# Patient Record
Sex: FEMALE | Race: WHITE | Employment: OTHER | URBAN - METROPOLITAN AREA
[De-identification: names, ages, dates, MRNs, and addresses within clinical notes are randomized per-mention and may not be internally consistent; named-entity substitution may affect disease eponyms.]

---

## 2017-05-15 PROBLEM — E78.01 FAMILIAL HYPERCHOLESTEROLEMIA: Status: ACTIVE | Noted: 2017-05-15

## 2017-05-15 PROBLEM — I10 ESSENTIAL HYPERTENSION: Status: ACTIVE | Noted: 2017-05-15

## 2017-05-15 PROBLEM — M81.0 OSTEOPOROSIS: Status: ACTIVE | Noted: 2017-05-15

## 2018-03-15 ENCOUNTER — HOSPITAL ENCOUNTER (OUTPATIENT)
Age: 78
Discharge: HOME | End: 2018-03-15
Payer: MEDICARE

## 2018-03-15 LAB
ALBUMIN SERPL-MCNC: 4.2 G/DL (ref 3.5–5.2)
ALP BLD-CCNC: 40 U/L (ref 35–104)
ALT SERPL-CCNC: 16 U/L (ref 0–32)
ANION GAP SERPL CALCULATED.3IONS-SCNC: 11 MMOL/L (ref 7–16)
AST SERPL-CCNC: 18 U/L (ref 0–31)
BILIRUB SERPL-MCNC: 0.4 MG/DL (ref 0–1.2)
BUN BLDV-MCNC: 24 MG/DL (ref 8–23)
CALCIUM SERPL-MCNC: 9.5 MG/DL (ref 8.6–10.2)
CHLORIDE BLD-SCNC: 105 MMOL/L (ref 98–107)
CHOLESTEROL, TOTAL: 170 MG/DL (ref 0–199)
CO2: 26 MMOL/L (ref 22–29)
CREAT SERPL-MCNC: 0.7 MG/DL (ref 0.5–1)
GFR AFRICAN AMERICAN: >60
GFR NON-AFRICAN AMERICAN: >60 ML/MIN/1.73
GLUCOSE BLD-MCNC: 114 MG/DL (ref 74–109)
HDLC SERPL-MCNC: 60 MG/DL
LDL CHOLESTEROL CALCULATED: 82 MG/DL (ref 0–99)
POTASSIUM SERPL-SCNC: 4.5 MMOL/L (ref 3.5–5)
SODIUM BLD-SCNC: 142 MMOL/L (ref 132–146)
TOTAL PROTEIN: 6.8 G/DL (ref 6.4–8.3)
TRIGL SERPL-MCNC: 139 MG/DL (ref 0–149)
VLDLC SERPL CALC-MCNC: 28 MG/DL

## 2018-03-15 PROCEDURE — 80053 COMPREHEN METABOLIC PANEL: CPT

## 2018-03-15 PROCEDURE — 36415 COLL VENOUS BLD VENIPUNCTURE: CPT

## 2018-03-15 PROCEDURE — 80061 LIPID PANEL: CPT

## 2018-10-08 ENCOUNTER — HOSPITAL ENCOUNTER (OUTPATIENT)
Age: 78
Discharge: HOME | End: 2018-10-08
Payer: MEDICARE

## 2018-10-08 DIAGNOSIS — E55.9 VITAMIN D DEFICIENCY: ICD-10-CM

## 2018-10-08 DIAGNOSIS — D50.0 IRON DEFICIENCY ANEMIA SECONDARY TO BLOOD LOSS (CHRONIC): ICD-10-CM

## 2018-10-08 DIAGNOSIS — E03.9 PRIMARY HYPOTHYROIDISM: ICD-10-CM

## 2018-10-08 DIAGNOSIS — E78.01 FAMILIAL HYPERCHOLESTEROLEMIA: ICD-10-CM

## 2018-10-08 DIAGNOSIS — R73.9 HYPERGLYCEMIA: ICD-10-CM

## 2018-10-08 LAB
ALBUMIN SERPL-MCNC: 4.4 G/DL (ref 3.5–5.2)
ALP BLD-CCNC: 40 U/L (ref 35–104)
ALT SERPL-CCNC: 21 U/L (ref 0–32)
ANION GAP SERPL CALCULATED.3IONS-SCNC: 11 MMOL/L (ref 7–16)
AST SERPL-CCNC: 22 U/L (ref 0–31)
BILIRUB SERPL-MCNC: 0.4 MG/DL (ref 0–1.2)
BUN BLDV-MCNC: 23 MG/DL (ref 8–23)
CALCIUM SERPL-MCNC: 9.8 MG/DL (ref 8.6–10.2)
CHLORIDE BLD-SCNC: 103 MMOL/L (ref 98–107)
CHOLESTEROL, TOTAL: 175 MG/DL (ref 0–199)
CO2: 29 MMOL/L (ref 22–29)
CREAT SERPL-MCNC: 0.8 MG/DL (ref 0.5–1)
GFR AFRICAN AMERICAN: >60
GFR NON-AFRICAN AMERICAN: >60 ML/MIN/1.73
GLUCOSE BLD-MCNC: 107 MG/DL (ref 74–109)
HBA1C MFR BLD: 6 % (ref 4–5.6)
HCT VFR BLD CALC: 41.9 % (ref 34–48)
HDLC SERPL-MCNC: 63 MG/DL
HEMOGLOBIN: 13.8 G/DL (ref 11.5–15.5)
LDL CHOLESTEROL CALCULATED: 79 MG/DL (ref 0–99)
MCH RBC QN AUTO: 29.9 PG (ref 26–35)
MCHC RBC AUTO-ENTMCNC: 32.9 % (ref 32–34.5)
MCV RBC AUTO: 90.9 FL (ref 80–99.9)
PDW BLD-RTO: 13.7 FL (ref 11.5–15)
PLATELET # BLD: 208 E9/L (ref 130–450)
PMV BLD AUTO: 11.4 FL (ref 7–12)
POTASSIUM SERPL-SCNC: 4.6 MMOL/L (ref 3.5–5)
RBC # BLD: 4.61 E12/L (ref 3.5–5.5)
SODIUM BLD-SCNC: 143 MMOL/L (ref 132–146)
T4 FREE: 1.23 NG/DL (ref 0.93–1.7)
TOTAL PROTEIN: 7.3 G/DL (ref 6.4–8.3)
TRIGL SERPL-MCNC: 167 MG/DL (ref 0–149)
TSH SERPL DL<=0.05 MIU/L-ACNC: 1.38 UIU/ML (ref 0.27–4.2)
VITAMIN D 25-HYDROXY: 55 NG/ML (ref 30–100)
VLDLC SERPL CALC-MCNC: 33 MG/DL
WBC # BLD: 6.8 E9/L (ref 4.5–11.5)

## 2018-10-08 PROCEDURE — 85027 COMPLETE CBC AUTOMATED: CPT

## 2018-10-08 PROCEDURE — 84443 ASSAY THYROID STIM HORMONE: CPT

## 2018-10-08 PROCEDURE — 80053 COMPREHEN METABOLIC PANEL: CPT

## 2018-10-08 PROCEDURE — 36415 COLL VENOUS BLD VENIPUNCTURE: CPT

## 2018-10-08 PROCEDURE — 83036 HEMOGLOBIN GLYCOSYLATED A1C: CPT

## 2018-10-08 PROCEDURE — 82306 VITAMIN D 25 HYDROXY: CPT

## 2018-10-08 PROCEDURE — 80061 LIPID PANEL: CPT

## 2018-10-08 PROCEDURE — 84439 ASSAY OF FREE THYROXINE: CPT

## 2018-11-30 ENCOUNTER — HOSPITAL ENCOUNTER (OUTPATIENT)
Dept: GENERAL RADIOLOGY | Age: 78
Discharge: HOME | End: 2018-12-02
Payer: MEDICARE

## 2018-11-30 ENCOUNTER — HOSPITAL ENCOUNTER (OUTPATIENT)
Age: 78
Discharge: HOME | End: 2018-12-02
Payer: MEDICARE

## 2018-11-30 ENCOUNTER — HOSPITAL ENCOUNTER (OUTPATIENT)
Age: 78
Discharge: HOME | End: 2018-11-30
Payer: MEDICARE

## 2018-11-30 DIAGNOSIS — M79.10 MYALGIA: ICD-10-CM

## 2018-11-30 DIAGNOSIS — M54.40 ACUTE LEFT-SIDED LOW BACK PAIN WITH SCIATICA, SCIATICA LATERALITY UNSPECIFIED: ICD-10-CM

## 2018-11-30 LAB — TOTAL CK: 434 U/L (ref 20–180)

## 2018-11-30 PROCEDURE — 82550 ASSAY OF CK (CPK): CPT

## 2018-11-30 PROCEDURE — 72100 X-RAY EXAM L-S SPINE 2/3 VWS: CPT

## 2018-11-30 PROCEDURE — 36415 COLL VENOUS BLD VENIPUNCTURE: CPT

## 2019-01-17 ENCOUNTER — HOSPITAL ENCOUNTER (OUTPATIENT)
Dept: MRI IMAGING | Age: 79
Discharge: HOME | End: 2019-01-19
Payer: MEDICARE

## 2019-01-17 DIAGNOSIS — M54.42 ACUTE LEFT-SIDED LOW BACK PAIN WITH LEFT-SIDED SCIATICA: ICD-10-CM

## 2019-01-17 PROCEDURE — 72158 MRI LUMBAR SPINE W/O & W/DYE: CPT

## 2019-01-17 PROCEDURE — A9577 INJ MULTIHANCE: HCPCS | Performed by: RADIOLOGY

## 2019-01-17 PROCEDURE — 6360000004 HC RX CONTRAST MEDICATION: Performed by: RADIOLOGY

## 2019-01-17 RX ADMIN — GADOBENATE DIMEGLUMINE 20 ML: 529 INJECTION, SOLUTION INTRAVENOUS at 08:40

## 2019-03-01 ENCOUNTER — INITIAL CONSULT (OUTPATIENT)
Dept: NEUROSURGERY | Age: 79
End: 2019-03-01
Payer: MEDICARE

## 2019-03-01 VITALS
DIASTOLIC BLOOD PRESSURE: 73 MMHG | SYSTOLIC BLOOD PRESSURE: 137 MMHG | BODY MASS INDEX: 27.28 KG/M2 | HEART RATE: 81 BPM | WEIGHT: 180 LBS | HEIGHT: 68 IN

## 2019-03-01 DIAGNOSIS — M48.061 DEGENERATIVE LUMBAR SPINAL STENOSIS: ICD-10-CM

## 2019-03-01 PROCEDURE — G8427 DOCREV CUR MEDS BY ELIG CLIN: HCPCS | Performed by: NEUROLOGICAL SURGERY

## 2019-03-01 PROCEDURE — 1090F PRES/ABSN URINE INCON ASSESS: CPT | Performed by: NEUROLOGICAL SURGERY

## 2019-03-01 PROCEDURE — 99203 OFFICE O/P NEW LOW 30 MIN: CPT | Performed by: NEUROLOGICAL SURGERY

## 2019-03-01 PROCEDURE — G8482 FLU IMMUNIZE ORDER/ADMIN: HCPCS | Performed by: NEUROLOGICAL SURGERY

## 2019-03-01 PROCEDURE — G8419 CALC BMI OUT NRM PARAM NOF/U: HCPCS | Performed by: NEUROLOGICAL SURGERY

## 2019-03-01 PROCEDURE — 1101F PT FALLS ASSESS-DOCD LE1/YR: CPT | Performed by: NEUROLOGICAL SURGERY

## 2019-03-01 RX ORDER — GABAPENTIN 300 MG/1
300 CAPSULE ORAL 3 TIMES DAILY
Qty: 21 CAPSULE | Refills: 0 | Status: SHIPPED | OUTPATIENT
Start: 2019-03-01 | End: 2019-03-19

## 2019-03-12 ENCOUNTER — OFFICE VISIT (OUTPATIENT)
Dept: PAIN MANAGEMENT | Age: 79
End: 2019-03-12
Payer: MEDICARE

## 2019-03-12 ENCOUNTER — PREP FOR PROCEDURE (OUTPATIENT)
Dept: PAIN MANAGEMENT | Age: 79
End: 2019-03-12

## 2019-03-12 VITALS
DIASTOLIC BLOOD PRESSURE: 78 MMHG | HEART RATE: 81 BPM | TEMPERATURE: 98.1 F | WEIGHT: 179 LBS | BODY MASS INDEX: 27.13 KG/M2 | HEIGHT: 68 IN | SYSTOLIC BLOOD PRESSURE: 122 MMHG | OXYGEN SATURATION: 98 % | RESPIRATION RATE: 16 BRPM

## 2019-03-12 DIAGNOSIS — M51.36 DDD (DEGENERATIVE DISC DISEASE), LUMBAR: ICD-10-CM

## 2019-03-12 DIAGNOSIS — M71.38 SYNOVIAL CYST OF LUMBAR FACET JOINT: ICD-10-CM

## 2019-03-12 DIAGNOSIS — M54.16 LUMBAR RADICULOPATHY: Primary | ICD-10-CM

## 2019-03-12 DIAGNOSIS — G89.4 CHRONIC PAIN SYNDROME: ICD-10-CM

## 2019-03-12 DIAGNOSIS — M48.061 SPINAL STENOSIS OF LUMBAR REGION, UNSPECIFIED WHETHER NEUROGENIC CLAUDICATION PRESENT: ICD-10-CM

## 2019-03-12 PROBLEM — M51.369 DDD (DEGENERATIVE DISC DISEASE), LUMBAR: Status: ACTIVE | Noted: 2019-03-12

## 2019-03-12 PROCEDURE — 99213 OFFICE O/P EST LOW 20 MIN: CPT

## 2019-03-12 PROCEDURE — 99204 OFFICE O/P NEW MOD 45 MIN: CPT | Performed by: ANESTHESIOLOGY

## 2019-03-19 ENCOUNTER — HOSPITAL ENCOUNTER (OUTPATIENT)
Dept: OPERATING ROOM | Age: 79
Discharge: HOME | End: 2019-03-19
Payer: MEDICARE

## 2019-03-19 ENCOUNTER — HOSPITAL ENCOUNTER (OUTPATIENT)
Age: 79
Setting detail: OUTPATIENT SURGERY
Discharge: HOME | End: 2019-03-19
Attending: ANESTHESIOLOGY | Admitting: ANESTHESIOLOGY
Payer: MEDICARE

## 2019-03-19 VITALS
OXYGEN SATURATION: 96 % | SYSTOLIC BLOOD PRESSURE: 158 MMHG | RESPIRATION RATE: 14 BRPM | HEART RATE: 78 BPM | DIASTOLIC BLOOD PRESSURE: 77 MMHG

## 2019-03-19 DIAGNOSIS — M54.16 LUMBAR RADICULOPATHY: ICD-10-CM

## 2019-03-19 PROCEDURE — 7100000010 HC PHASE II RECOVERY - FIRST 15 MIN: Performed by: ANESTHESIOLOGY

## 2019-03-19 PROCEDURE — 64484 NJX AA&/STRD TFRM EPI L/S EA: CPT | Performed by: ANESTHESIOLOGY

## 2019-03-19 PROCEDURE — 6360000004 HC RX CONTRAST MEDICATION: Performed by: ANESTHESIOLOGY

## 2019-03-19 PROCEDURE — 3600000015 HC SURGERY LEVEL 5 ADDTL 15MIN: Performed by: ANESTHESIOLOGY

## 2019-03-19 PROCEDURE — 3209999900 FLUORO FOR SURGICAL PROCEDURES

## 2019-03-19 PROCEDURE — 64483 NJX AA&/STRD TFRM EPI L/S 1: CPT | Performed by: ANESTHESIOLOGY

## 2019-03-19 PROCEDURE — 7100000011 HC PHASE II RECOVERY - ADDTL 15 MIN: Performed by: ANESTHESIOLOGY

## 2019-03-19 PROCEDURE — 2709999900 HC NON-CHARGEABLE SUPPLY: Performed by: ANESTHESIOLOGY

## 2019-03-19 PROCEDURE — 2500000003 HC RX 250 WO HCPCS: Performed by: ANESTHESIOLOGY

## 2019-03-19 PROCEDURE — 3600000005 HC SURGERY LEVEL 5 BASE: Performed by: ANESTHESIOLOGY

## 2019-03-19 PROCEDURE — 6360000002 HC RX W HCPCS: Performed by: ANESTHESIOLOGY

## 2019-03-19 RX ORDER — LIDOCAINE HYDROCHLORIDE 5 MG/ML
INJECTION, SOLUTION INFILTRATION; INTRAVENOUS PRN
Status: DISCONTINUED | OUTPATIENT
Start: 2019-03-19 | End: 2019-03-19 | Stop reason: ALTCHOICE

## 2019-03-19 ASSESSMENT — PAIN SCALES - GENERAL
PAINLEVEL_OUTOF10: 0
PAINLEVEL_OUTOF10: 0

## 2019-03-19 ASSESSMENT — PAIN - FUNCTIONAL ASSESSMENT: PAIN_FUNCTIONAL_ASSESSMENT: 0-10

## 2019-04-05 ENCOUNTER — OFFICE VISIT (OUTPATIENT)
Dept: PAIN MANAGEMENT | Age: 79
End: 2019-04-05
Payer: MEDICARE

## 2019-04-05 ENCOUNTER — PREP FOR PROCEDURE (OUTPATIENT)
Dept: PAIN MANAGEMENT | Age: 79
End: 2019-04-05

## 2019-04-05 VITALS
DIASTOLIC BLOOD PRESSURE: 76 MMHG | HEIGHT: 68 IN | HEART RATE: 75 BPM | TEMPERATURE: 98.4 F | BODY MASS INDEX: 27.28 KG/M2 | OXYGEN SATURATION: 96 % | WEIGHT: 180 LBS | RESPIRATION RATE: 16 BRPM | SYSTOLIC BLOOD PRESSURE: 134 MMHG

## 2019-04-05 DIAGNOSIS — M54.16 LUMBAR RADICULOPATHY: Primary | ICD-10-CM

## 2019-04-05 DIAGNOSIS — M51.36 DDD (DEGENERATIVE DISC DISEASE), LUMBAR: ICD-10-CM

## 2019-04-05 DIAGNOSIS — M48.061 SPINAL STENOSIS OF LUMBAR REGION, UNSPECIFIED WHETHER NEUROGENIC CLAUDICATION PRESENT: ICD-10-CM

## 2019-04-05 DIAGNOSIS — M71.38 SYNOVIAL CYST OF LUMBAR FACET JOINT: ICD-10-CM

## 2019-04-05 PROCEDURE — 99213 OFFICE O/P EST LOW 20 MIN: CPT | Performed by: ANESTHESIOLOGY

## 2019-04-05 NOTE — PROGRESS NOTES
[facility]  [address] [facility], [address] [place] [address]  [phone]    Follow up Note      Alma Lopez     Date of Visit:  4/5/2019    CC:  Patient presents for follow up   Chief Complaint   Patient presents with    Follow Up After Procedure      Left Transforaminal epidural steroid injection under fluoroscopic guidance at foraminal level L4 and L5 (#1). HPI:  [de-identified] yrs pleasant lady with h/o Low back and left LE pain mainly over the left posterior thigh for over 3-4 months.     Failed conservative RX - meds and PT.     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 Has been evaluated by Dr. Maribell Marcos and recommended interventional procedures    S/P Left lumbar TFESI L4/ L5- has helped with functionality- able to walk longer, stand longer and sleep better. Pain relief is minimal.  Continues to have pain. Pain is slightly improved. Change in quality of symptoms:no. Medication side effects:none. Recent diagnostic testing:none. Recent interventional procedures:lumbar TFESI- improved functionality noted. .    She has not been on anticoagulation medications to include none. The patient  has not been on herbal supplements. The patient is not diabetic. Previous treatments:    Physical therapy for 4-6 weeks - with no significant benefit.    Tried meds , OTC NSAID's, gabapentin etc.- not much benefit       Imaging studies:  MRI LS spine In 1/17/2019:     EXAM: MRI LUMBAR SPINE W WO CONTRAST       COMPARISON: None       HISTORY: Left leg pain        TECHNIQUE: Multiplanar T1 and T2-weighted MRI images of the lumbar   spine were performed.       CONTRAST: 20 mL of gadolinium based contrast was administered.       FINDINGS:        BONE MARROW SIGNAL: There is normal marrow signal.       T12-L1: Normal T12-L1       L1-L2: Normal L1-L2       L2-L3: Normal L2-L3       L3-L4: Normal L3-L4       L4-L5: There is a large heterogeneous high signal T2 mass within the   central canal eccentrically on the left. This is noted adjacent to the   synovial facet joint and measures 1.3 x 0.9 cm. This results in severe   impingement upon the thecal sac and nerve roots. There is peripheral   rim-like enhancement on the postcontrast images. No extension into the   foramen. There is a superimposed disc bulge with facet hypertrophy   which contributes to the amount of canal narrowing.       L5-S1: Facet hypertrophy without central or foraminal stenosis.       SOFT TISSUES: The visualized paravertebral soft tissues are within   normal limits.           Impression   Large rim-enhancing high signal lesion in the L4-5 canal measuring 1.3   x 0.9 cm, likely extradural in location. This is most likely   representing a synovial facet cyst resulting in severe mass effect   upon the thecal sac. Other etiologies such as meningioma or nerve   sheath tumor considered less likely given the lack of homogeneous   enhancement. Potential Aberrant Drug-Related Behavior:  No    Urine Drug Screening:No      Past Medical History:   Diagnosis Date    Arthritis     right hip    Cancer ([address]     breast 1997 approx. left    Hyperlipidemia     Hypertension     Osteoarthrosis     Osteoporosis     Other chronic postoperative pain     Undiagnosed cardiac murmurs        Past Surgical History:   Procedure Laterality Date    APPENDECTOMY      BREAST SURGERY      biopsied on left breast    COLONOSCOPY      EPIDURAL STEROID INJECTION Left 3/19/2019    LEFT LUMBAR TRANSFORAMINAL EPIDURAL STEROID INJECTION: L4 / L5 performed by Keegan Long MD at [facility]      cataracts OU    FRACTURE SURGERY      right ankle    HYSTERECTOMY  1979    JOINT REPLACEMENT      left hip.r hip    NERVE BLOCK Left 03/19/2019    left lumbar transforaminal epidural    TONSILLECTOMY      TOTAL HIP ARTHROPLASTY Right Oct 2013    with cell saver    TOTAL KNEE ARTHROPLASTY Left 3/8/16       Prior to Admission medications    Medication Sig Start Date End Date Taking? Authorizing Provider   amLODIPine (NORVASC) 5 MG tablet Take 1 tablet by mouth daily 4/3/19  Yes Magalene Na, DO   atorvastatin (LIPITOR) 40 MG tablet Take 1 tablet by mouth daily Mon,wed,fri 1/10/19  Yes Magalene Na, DO   alendronate (FOSAMAX) 70 MG tablet Take 1 tablet by mouth every 7 days Takes on friday 1/10/19  Yes Magalene Na, DO   vitamin D (CHOLECALCIFEROL) 1000 UNITS TABS tablet Take 2,000 Units by mouth daily   Yes Historical Provider, MD   Red Yeast Rice 600 MG CAPS Take 2 capsules by mouth three times a week   Yes Historical Provider, MD   Multiple Vitamins-Minerals (CENTRUM SILVER ADULT 50+ PO) Take  by mouth daily. Yes Historical Provider, MD   calcium-vitamin D (OSCAL 500/200 D-3) 500-200 MG-UNIT per tablet Take 1 tablet by mouth 2 times daily. Yes Historical Provider, MD   NONFORMULARY daily. Tunde Red   Yes Historical Provider, MD   aspirin 81 MG tablet Take 81 mg by mouth nightly. Yes Historical Provider, MD       No Known Allergies    Social History     Socioeconomic History    Marital status:       Spouse name: Not on file    Number of children: Not on file    Years of education: Not on file    Highest education level: Not on file   Occupational History    Not on file   Social Needs    Financial resource strain: Not on file    Food insecurity:     Worry: Not on file     Inability: Not on file    Transportation needs:     Medical: Not on file     Non-medical: Not on file   Tobacco Use    Smoking status: Former Smoker     Packs/day: 0.25     Years: 3.00     Pack years: 0.75     Last attempt to quit: 9/10/1998     Years since quittin.5    Smokeless tobacco: Never Used    Tobacco comment: quit smoking approx 20 yrs ago   Substance and Sexual Activity    Alcohol use: Yes     Comment: occasionally    Drug use: No    Sexual activity: Not on file   Lifestyle    Physical activity:     Days per week: Not on file     Minutes per session: Not on file    Stress: Not on file   Relationships    Social connections:     Talks on phone: Not on file     Gets together: Not on file     Attends [employer] service: Not on file     Active member of club or organization: Not on file     Attends meetings of clubs or organizations: Not on file     Relationship status: Not on file    Intimate partner violence:     Fear of current or ex partner: Not on file     Emotionally abused: Not on file     Physically abused: Not on file     Forced sexual activity: Not on file   Other Topics Concern    Not on file   Social History Narrative    Not on file       History reviewed. No pertinent family history. REVIEW OF SYSTEMS:     Álvaro Mir denies fever/chills, chest pain, shortness of breath, new bowel or bladder complaints. All other review of systems was negative. PHYSICAL EXAMINATION:      /76   Pulse 75   Temp 98.4 °F (36.9 °C) (Oral)   Resp 16   Ht 5' 8\" (1.727 m)   Wt 180 lb (81.6 kg)   SpO2 96%   BMI 27.37 kg/m²     A & O X 3, NAD    Lumbar spine:     Spine inspection:normal   CVA tenderness:No   Palpation: NO tenderness paravertebral muscles, left, right. Range of motion:Some reduction noted in extension    Neurological:     Sensory:normal to light touch     Motor:                No change    Reflexes:    NO change  Gait:normal      Assessment/Plan:   Diagnosis Orders   1. Lumbar radiculopathy     2. DDD (degenerative disc disease), lumbar     3. Spinal stenosis of lumbar region, unspecified whether neurogenic claudication present     4. Synovial cyst of lumbar facet joint        S/P TFESI - helped with improved functionality but had minimal pain relief. Will try different approach. Will plan L4-5 Interlaminar EYAD left paramedian approach instead of TFESI   (or L5-S1 if access is difficult). HEP    If the EYAD is not helping.  Consider referring back to Dr. Remigio Lundborg for surgical eval.      Keegan Long, MD

## 2019-04-30 ENCOUNTER — HOSPITAL ENCOUNTER (OUTPATIENT)
Age: 79
Setting detail: OUTPATIENT SURGERY
Discharge: HOME | End: 2019-04-30
Attending: ANESTHESIOLOGY | Admitting: ANESTHESIOLOGY
Payer: MEDICARE

## 2019-04-30 VITALS
HEART RATE: 77 BPM | DIASTOLIC BLOOD PRESSURE: 80 MMHG | OXYGEN SATURATION: 97 % | SYSTOLIC BLOOD PRESSURE: 157 MMHG | RESPIRATION RATE: 18 BRPM

## 2019-04-30 DIAGNOSIS — M54.16 LUMBAR RADICULOPATHY: ICD-10-CM

## 2019-04-30 PROCEDURE — 7100000010 HC PHASE II RECOVERY - FIRST 15 MIN: Performed by: ANESTHESIOLOGY

## 2019-04-30 PROCEDURE — 6360000004 HC RX CONTRAST MEDICATION: Performed by: ANESTHESIOLOGY

## 2019-04-30 PROCEDURE — 7100000011 HC PHASE II RECOVERY - ADDTL 15 MIN: Performed by: ANESTHESIOLOGY

## 2019-04-30 PROCEDURE — 3600000005 HC SURGERY LEVEL 5 BASE: Performed by: ANESTHESIOLOGY

## 2019-04-30 PROCEDURE — 62323 NJX INTERLAMINAR LMBR/SAC: CPT | Performed by: ANESTHESIOLOGY

## 2019-04-30 PROCEDURE — 2500000003 HC RX 250 WO HCPCS: Performed by: ANESTHESIOLOGY

## 2019-04-30 PROCEDURE — 2709999900 HC NON-CHARGEABLE SUPPLY: Performed by: ANESTHESIOLOGY

## 2019-04-30 PROCEDURE — 6360000002 HC RX W HCPCS: Performed by: ANESTHESIOLOGY

## 2019-04-30 PROCEDURE — 3600000015 HC SURGERY LEVEL 5 ADDTL 15MIN: Performed by: ANESTHESIOLOGY

## 2019-04-30 RX ORDER — LIDOCAINE HYDROCHLORIDE 5 MG/ML
INJECTION, SOLUTION INFILTRATION; INTRAVENOUS PRN
Status: DISCONTINUED | OUTPATIENT
Start: 2019-04-30 | End: 2019-04-30 | Stop reason: ALTCHOICE

## 2019-04-30 ASSESSMENT — PAIN SCALES - GENERAL
PAINLEVEL_OUTOF10: 0
PAINLEVEL_OUTOF10: 0

## 2019-04-30 ASSESSMENT — PAIN - FUNCTIONAL ASSESSMENT: PAIN_FUNCTIONAL_ASSESSMENT: 0-10

## 2019-04-30 NOTE — OP NOTE
2019    Patient: Janee Trammell  :  1940  Age:  [de-identified] y.o. Sex:  female     PRE-OPERATIVE DIAGNOSIS: Lumbar disc displacement, lumbar radiculopathy. POST-OPERATIVE DIAGNOSIS: Same. PROCEDURE: Fluoroscopic guided therapeutic lumbar epidural steroid injection at the L5-S1 level . SURGEON: Mary Hopson MD    ANESTHESIA: local    ESTIMATED BLOOD LOSS: None.  ______________________________________________________________________    BRIEF HISTORY:  Janee Trammell comes in today for  lumbar epidural injection at L5-S1 level. The potential complications of this procedure were discussed with her again today. She has elected to undergo the aforementioned procedure. Anival complete History & Physical examination were reviewed in depth, a copy of which is in the chart. DESCRIPTION OF PROCEDURE:    After confirming written and informed consent, a time-out was performed and Anival name and date of birth, the procedure to be performed as well as the plan for the location of the needle insertion were confirmed. The patient was brought into the procedure room and placed in the prone position on the fluoroscopy table. A pillow was placed under the patient's lower abdomen/upper pelvis to increase lumbar interlaminar space. Standard monitors were placed, and vital signs were observed throughout the procedure. The area of the lumbar spine was prepped with chloraprep and draped in a sterile manner. The L5-S1 interspace was identified and marked under AP fluoroscopy. The skin and subcutaneous tissues at the above level were anesthestized with 0.5% lidocaine. With intermittent fluoroscopy, an # 18 gauge 3 1/2 tuohy epidural needle was inserted and directed toward the interlaminar space. The needle was slowly advanced using loss of resistance technique and 5 cc glass syringe  until the tip of the epidural needle has passed through the ligamentum flavum and entered the epidural space.  AP and lateral fluoroscopic imaging is performed to verify that the epidural needle is properly placed. Negative aspiration of blood and CSF was confirmed. 0.5 ml of omnipaque 240 was used for confirmation of even epidural spread under both live and AP fluoroscopy. After negative aspiration, a solution of 0.5 % Lidocaine 3 ml and 60 mg DepoMedrol was easily injected. The needle was gently removed intact . The patient neck was cleaned and a Band-Aid was placed over the needle insertion point. Disposition the patient tolerated the procedure well and there were no complications . Vital signs remained stable throughout the procedure. The patient was escorted to the recovery area where they remained until discharge and written discharge instructions for the procedure were given. Plan: Ishan Dunne will return to our pain management center as scheduled.      Osei Armando MD

## 2019-04-30 NOTE — H&P
[address]  [address] [facility], [address] [place] [address] [place]  [phone]    Procedure History & Physical      [facility]     HPI:    Patient  is here for Lumbar epidural steroid injection for low back and LE pain  Labs/imaging studies reviewed   All question and concerns addressed including R/B/A associated with the procedure    Past Medical History:   Diagnosis Date    Arthritis     right hip    Cancer ([address]     breast 1997 approx. left    Hyperlipidemia     Hypertension     Osteoarthrosis     Osteoporosis     Other chronic postoperative pain     Undiagnosed cardiac murmurs        Past Surgical History:   Procedure Laterality Date    APPENDECTOMY      BREAST SURGERY      biopsied on left breast    COLONOSCOPY      EPIDURAL STEROID INJECTION Left 3/19/2019    LEFT LUMBAR TRANSFORAMINAL EPIDURAL STEROID INJECTION: L4 / L5 performed by Polly Castaneda MD at [facility]      cataracts OU    FRACTURE SURGERY      right ankle    HYSTERECTOMY  1979    JOINT REPLACEMENT      left hip.r hip    NERVE BLOCK Left 03/19/2019    left lumbar transforaminal epidural    TONSILLECTOMY      TOTAL HIP ARTHROPLASTY Right Oct 2013    with cell saver    TOTAL KNEE ARTHROPLASTY Left 3/8/16       Prior to Admission medications    Medication Sig Start Date End Date Taking? Authorizing Provider   amLODIPine (NORVASC) 5 MG tablet Take 1 tablet by mouth daily 4/3/19  Yes Alvarez Shoemaker, DO   atorvastatin (LIPITOR) 40 MG tablet Take 1 tablet by mouth daily Mon,wed,fri 1/10/19  Yes Alvarez Shoemaker, DO   vitamin D (CHOLECALCIFEROL) 1000 UNITS TABS tablet Take 2,000 Units by mouth daily   Yes Historical Provider, MD   Red Yeast Rice 600 MG CAPS Take 2 capsules by mouth three times a week   Yes Historical Provider, MD   Multiple Vitamins-Minerals (CENTRUM SILVER ADULT 50+ PO) Take  by mouth daily.    Yes Historical Provider, MD   calcium-vitamin D (OSCAL 500/200 D-3) on file. REVIEW OF SYSTEMS:    CONSTITUTIONAL:  negative for  fevers, chills, sweats and fatigue    RESPIRATORY:  negative for  dry cough, cough with sputum, dyspnea, wheezing and chest pain    CARDIOVASCULAR:  negative for chest pain, dyspnea, palpitations, syncope    GASTROINTESTINAL:  negative for nausea, vomiting, change in bowel habits, diarrhea, constipation and abdominal pain    MUSCULOSKELETAL: negative for muscle weakness    SKIN: negative for itching or rashes. BEHAVIOR/PSYCH:  negative for poor appetite, increased appetite, decreased sleep and poor concentration    All other systems negative      PHYSICAL EXAM:    VITALS:  BP (!) 169/73   Pulse 77   Resp 14   SpO2 95%     CONSTITUTIONAL:  awake, alert, cooperative, no apparent distress, and appears stated age    EYES: PERRLA, EOMI    LUNGS:  No increased work of breathing, no audible wheezing    CARDIOVASCULAR:  regular rate and rhythm    ABDOMEN:  Soft non tender non distended     EXTREMITIES: no signs of clubbing or cyanosis. MUSCULOSKELETAL: negative for flaccid muscle tone or spastic movements. SKIN: gross examination reveals no signs of rashes, or diaphoresis. NEURO: Cranial nerves II-XII grossly intact. No signs of agitated mood.        Assessment/Plan:    Patient  is here for Lumbar epidural steroid injection for low back and LE pain    Marisol Gaytan MD

## 2019-05-15 ENCOUNTER — OFFICE VISIT (OUTPATIENT)
Dept: PAIN MANAGEMENT | Age: 79
End: 2019-05-15
Payer: MEDICARE

## 2019-05-15 VITALS
TEMPERATURE: 97.7 F | RESPIRATION RATE: 18 BRPM | HEART RATE: 80 BPM | WEIGHT: 180 LBS | HEIGHT: 68 IN | DIASTOLIC BLOOD PRESSURE: 62 MMHG | OXYGEN SATURATION: 97 % | BODY MASS INDEX: 27.28 KG/M2 | SYSTOLIC BLOOD PRESSURE: 118 MMHG

## 2019-05-15 DIAGNOSIS — M71.38 SYNOVIAL CYST OF LUMBAR FACET JOINT: ICD-10-CM

## 2019-05-15 DIAGNOSIS — M51.36 DDD (DEGENERATIVE DISC DISEASE), LUMBAR: ICD-10-CM

## 2019-05-15 DIAGNOSIS — M48.061 SPINAL STENOSIS OF LUMBAR REGION, UNSPECIFIED WHETHER NEUROGENIC CLAUDICATION PRESENT: Primary | ICD-10-CM

## 2019-05-15 PROCEDURE — 99213 OFFICE O/P EST LOW 20 MIN: CPT | Performed by: ANESTHESIOLOGY

## 2019-05-15 NOTE — PROGRESS NOTES
L2-L3: Normal L2-L3       L3-L4: Normal L3-L4       L4-L5: There is a large heterogeneous high signal T2 mass within the   central canal eccentrically on the left. This is noted adjacent to the   synovial facet joint and measures 1.3 x 0.9 cm. This results in severe   impingement upon the thecal sac and nerve roots. There is peripheral   rim-like enhancement on the postcontrast images. No extension into the   foramen. There is a superimposed disc bulge with facet hypertrophy   which contributes to the amount of canal narrowing.       L5-S1: Facet hypertrophy without central or foraminal stenosis.       SOFT TISSUES: The visualized paravertebral soft tissues are within   normal limits.           Impression   Large rim-enhancing high signal lesion in the L4-5 canal measuring 1.3   x 0.9 cm, likely extradural in location. This is most likely   representing a synovial facet cyst resulting in severe mass effect   upon the thecal sac. Other etiologies such as meningioma or nerve   sheath tumor considered less likely given the lack of homogeneous   enhancement. Potential Aberrant Drug-Related Behavior:  No    Urine Drug Screening:No    OARRS report today: Yes reviewed. Past Medical History:   Diagnosis Date    Arthritis     right hip    Cancer ([address]     breast 1997 approx. left    Hyperlipidemia     Hypertension     Osteoarthrosis     Osteoporosis     Other chronic postoperative pain     Undiagnosed cardiac murmurs        Past Surgical History:   Procedure Laterality Date    ANESTHESIA NERVE BLOCK Left 4/30/2019    LUMBAR EPIDURAL STERIOD INJECTION L4-5  (LEFT PARAMEDIAN APPROACH) performed by Rupert Britt MD at [facility]      biopsied on left breast    COLONOSCOPY      EPIDURAL STEROID INJECTION Left 3/19/2019    LEFT LUMBAR TRANSFORAMINAL EPIDURAL STEROID INJECTION: L4 / L5 performed by Rupert Britt MD at [facility] cataracts OU    FRACTURE SURGERY      right ankle    HYSTERECTOMY  1979    JOINT REPLACEMENT      left hip.r hip    NERVE BLOCK Left 03/19/2019    left lumbar transforaminal epidural    NERVE BLOCK Left 04/30/2019    lumbar    TONSILLECTOMY      TOTAL HIP ARTHROPLASTY Right Oct 2013    with cell saver    TOTAL KNEE ARTHROPLASTY Left 3/8/16       Prior to Admission medications    Medication Sig Start Date End Date Taking? Authorizing Provider   amLODIPine (NORVASC) 5 MG tablet Take 1 tablet by mouth daily 4/3/19  Yes Miko Wallace, DO   atorvastatin (LIPITOR) 40 MG tablet Take 1 tablet by mouth daily Mon,wed,fri 1/10/19  Yes Miko Wallace, DO   alendronate (FOSAMAX) 70 MG tablet Take 1 tablet by mouth every 7 days Takes on friday 1/10/19  Yes Miko Wallace DO   vitamin D (CHOLECALCIFEROL) 1000 UNITS TABS tablet Take 2,000 Units by mouth daily   Yes Historical Provider, MD   Red Yeast Rice 600 MG CAPS Take 2 capsules by mouth three times a week   Yes Historical Provider, MD   Multiple Vitamins-Minerals (CENTRUM SILVER ADULT 50+ PO) Take  by mouth daily. Yes Historical Provider, MD   calcium-vitamin D (OSCAL 500/200 D-3) 500-200 MG-UNIT per tablet Take 1 tablet by mouth 2 times daily. Yes Historical Provider, MD   NONFORMULARY daily. Tunde Red   Yes Historical Provider, MD   aspirin 81 MG tablet Take 81 mg by mouth nightly. Yes Historical Provider, MD       No Known Allergies    Social History     Socioeconomic History    Marital status:       Spouse name: Not on file    Number of children: Not on file    Years of education: Not on file    Highest education level: Not on file   Occupational History    Not on file   Social Needs    Financial resource strain: Not on file    Food insecurity:     Worry: Not on file     Inability: Not on file    Transportation needs:     Medical: Not on file     Non-medical: Not on file   Tobacco Use    Smoking status: Former Smoker     Packs/day: disease), lumbar       S/P TFESI - with significant pain relief and improvement in functionality. She is very satisfied with the result. She will continue HEP    If pain recurs, can repeat EYAD. F/u in 3-4 months (or sooner if pain recurs)    If the interventions stop working in future, consider re-eval with Dr. Anirudh Meza for surgical eval.      Erik Hurtado MD      Dear Dr. Anirudh Meza,   Thank you for referring Ms. Nydia Santos and allowing us to participate in her care. Please do not hesitate to contact me if you have any questions regarding her care. Dea Augustine MD     CC:  1) Dr. Jessica Parrish MD      Department of neurosurgery    2) Faye Nava,   [address]   [place] [id number]

## 2019-05-15 NOTE — PROGRESS NOTES
Justin Chavez presents to the [address] on 5/15/2019. All Garrison is complaining of pain no pain. The pain is just a little bit. The pain is described as no pain today. Pain is rated on her best day at a 0, on her worst day at a 0, and on average at a 0 on the VAS scale. She took her last dose of taken no pain medication . Any procedures since your last visit: Yes, with 100 % relief. She has not been on anticoagulation medications.   Pacemaker or defibrilator: NO.       /62   Pulse 80   Temp 97.7 °F (36.5 °C) (Oral)   Resp 18   Ht 5' 8\" (1.727 m)   Wt 180 lb (81.6 kg)   SpO2 97%   BMI 27.37 kg/m²

## 2019-07-10 ENCOUNTER — HOSPITAL ENCOUNTER (OUTPATIENT)
Dept: MAMMOGRAPHY | Age: 79
Discharge: HOME | End: 2019-07-12
Payer: MEDICARE

## 2019-07-10 DIAGNOSIS — M81.0 SENILE OSTEOPOROSIS: ICD-10-CM

## 2019-07-10 PROCEDURE — 77080 DXA BONE DENSITY AXIAL: CPT

## 2019-10-07 ENCOUNTER — HOSPITAL ENCOUNTER (OUTPATIENT)
Age: 79
Discharge: HOME | End: 2019-10-07
Payer: MEDICARE

## 2019-10-07 DIAGNOSIS — E78.01 FAMILIAL HYPERCHOLESTEROLEMIA: ICD-10-CM

## 2019-10-07 DIAGNOSIS — R73.9 HYPERGLYCEMIA: ICD-10-CM

## 2019-10-07 DIAGNOSIS — E55.9 VITAMIN D DEFICIENCY: ICD-10-CM

## 2019-10-07 DIAGNOSIS — E03.9 PRIMARY HYPOTHYROIDISM: ICD-10-CM

## 2019-10-07 DIAGNOSIS — D50.0 IRON DEFICIENCY ANEMIA SECONDARY TO BLOOD LOSS (CHRONIC): ICD-10-CM

## 2019-10-07 LAB
ALBUMIN SERPL-MCNC: 4.7 G/DL (ref 3.5–5.2)
ALP BLD-CCNC: 42 U/L (ref 35–104)
ALT SERPL-CCNC: 20 U/L (ref 0–32)
ANION GAP SERPL CALCULATED.3IONS-SCNC: 11 MMOL/L (ref 7–16)
AST SERPL-CCNC: 24 U/L (ref 0–31)
BILIRUB SERPL-MCNC: 0.5 MG/DL (ref 0–1.2)
BUN BLDV-MCNC: 19 MG/DL (ref 8–23)
CALCIUM SERPL-MCNC: 10.6 MG/DL (ref 8.6–10.2)
CHLORIDE BLD-SCNC: 101 MMOL/L (ref 98–107)
CHOLESTEROL, TOTAL: 157 MG/DL (ref 0–199)
CO2: 30 MMOL/L (ref 22–29)
CREAT SERPL-MCNC: 0.9 MG/DL (ref 0.5–1)
GFR AFRICAN AMERICAN: >60
GFR NON-AFRICAN AMERICAN: >60 ML/MIN/1.73
GLUCOSE BLD-MCNC: 99 MG/DL (ref 74–99)
HBA1C MFR BLD: 5.8 % (ref 4–5.6)
HCT VFR BLD CALC: 46.3 % (ref 34–48)
HDLC SERPL-MCNC: 61 MG/DL
HEMOGLOBIN: 15.3 G/DL (ref 11.5–15.5)
LDL CHOLESTEROL CALCULATED: 73 MG/DL (ref 0–99)
MCH RBC QN AUTO: 30.1 PG (ref 26–35)
MCHC RBC AUTO-ENTMCNC: 33 % (ref 32–34.5)
MCV RBC AUTO: 91.1 FL (ref 80–99.9)
PDW BLD-RTO: 12.9 FL (ref 11.5–15)
PLATELET # BLD: 224 E9/L (ref 130–450)
PMV BLD AUTO: 10.8 FL (ref 7–12)
POTASSIUM SERPL-SCNC: 4.6 MMOL/L (ref 3.5–5)
RBC # BLD: 5.08 E12/L (ref 3.5–5.5)
SODIUM BLD-SCNC: 142 MMOL/L (ref 132–146)
TOTAL PROTEIN: 7.9 G/DL (ref 6.4–8.3)
TRIGL SERPL-MCNC: 116 MG/DL (ref 0–149)
TSH SERPL DL<=0.05 MIU/L-ACNC: 1.02 UIU/ML (ref 0.27–4.2)
VITAMIN D 25-HYDROXY: 84 NG/ML (ref 30–100)
VLDLC SERPL CALC-MCNC: 23 MG/DL
WBC # BLD: 6.6 E9/L (ref 4.5–11.5)

## 2019-10-07 PROCEDURE — 80061 LIPID PANEL: CPT

## 2019-10-07 PROCEDURE — 83036 HEMOGLOBIN GLYCOSYLATED A1C: CPT

## 2019-10-07 PROCEDURE — 84443 ASSAY THYROID STIM HORMONE: CPT

## 2019-10-07 PROCEDURE — 82306 VITAMIN D 25 HYDROXY: CPT

## 2019-10-07 PROCEDURE — 80053 COMPREHEN METABOLIC PANEL: CPT

## 2019-10-07 PROCEDURE — 36415 COLL VENOUS BLD VENIPUNCTURE: CPT

## 2019-10-07 PROCEDURE — 85027 COMPLETE CBC AUTOMATED: CPT

## 2020-06-08 ENCOUNTER — HOSPITAL ENCOUNTER (OUTPATIENT)
Age: 80
Discharge: HOME | End: 2020-06-08
Payer: MEDICARE

## 2020-06-08 LAB
ALBUMIN SERPL-MCNC: 4.3 G/DL (ref 3.5–5.2)
ALP BLD-CCNC: 43 U/L (ref 35–104)
ALT SERPL-CCNC: 22 U/L (ref 0–32)
ANION GAP SERPL CALCULATED.3IONS-SCNC: 12 MMOL/L (ref 7–16)
AST SERPL-CCNC: 19 U/L (ref 0–31)
BILIRUB SERPL-MCNC: 0.5 MG/DL (ref 0–1.2)
BUN BLDV-MCNC: 18 MG/DL (ref 8–23)
CALCIUM SERPL-MCNC: 9.5 MG/DL (ref 8.6–10.2)
CHLORIDE BLD-SCNC: 104 MMOL/L (ref 98–107)
CHOLESTEROL, TOTAL: 149 MG/DL (ref 0–199)
CO2: 26 MMOL/L (ref 22–29)
CREAT SERPL-MCNC: 0.8 MG/DL (ref 0.5–1)
GFR AFRICAN AMERICAN: >60
GFR NON-AFRICAN AMERICAN: >60 ML/MIN/1.73
GLUCOSE BLD-MCNC: 95 MG/DL (ref 74–99)
HBA1C MFR BLD: 6 % (ref 4–5.6)
HDLC SERPL-MCNC: 64 MG/DL
LDL CHOLESTEROL CALCULATED: 57 MG/DL (ref 0–99)
POTASSIUM SERPL-SCNC: 4.4 MMOL/L (ref 3.5–5)
SODIUM BLD-SCNC: 142 MMOL/L (ref 132–146)
TOTAL PROTEIN: 7.4 G/DL (ref 6.4–8.3)
TRIGL SERPL-MCNC: 138 MG/DL (ref 0–149)
VLDLC SERPL CALC-MCNC: 28 MG/DL

## 2020-06-08 PROCEDURE — 36415 COLL VENOUS BLD VENIPUNCTURE: CPT

## 2020-06-08 PROCEDURE — 80053 COMPREHEN METABOLIC PANEL: CPT

## 2020-06-08 PROCEDURE — 80061 LIPID PANEL: CPT

## 2020-06-08 PROCEDURE — 83036 HEMOGLOBIN GLYCOSYLATED A1C: CPT

## 2020-10-05 ENCOUNTER — OFFICE VISIT (OUTPATIENT)
Dept: ORTHOPEDIC SURGERY | Age: 80
End: 2020-10-05
Payer: MEDICARE

## 2020-10-05 VITALS — TEMPERATURE: 98 F | BODY MASS INDEX: 27.28 KG/M2 | WEIGHT: 180 LBS | HEIGHT: 68 IN

## 2020-10-05 PROCEDURE — G8484 FLU IMMUNIZE NO ADMIN: HCPCS | Performed by: ORTHOPAEDIC SURGERY

## 2020-10-05 PROCEDURE — G8399 PT W/DXA RESULTS DOCUMENT: HCPCS | Performed by: ORTHOPAEDIC SURGERY

## 2020-10-05 PROCEDURE — 1036F TOBACCO NON-USER: CPT | Performed by: ORTHOPAEDIC SURGERY

## 2020-10-05 PROCEDURE — 4040F PNEUMOC VAC/ADMIN/RCVD: CPT | Performed by: ORTHOPAEDIC SURGERY

## 2020-10-05 PROCEDURE — 1123F ACP DISCUSS/DSCN MKR DOCD: CPT | Performed by: ORTHOPAEDIC SURGERY

## 2020-10-05 PROCEDURE — 1090F PRES/ABSN URINE INCON ASSESS: CPT | Performed by: ORTHOPAEDIC SURGERY

## 2020-10-05 PROCEDURE — G8417 CALC BMI ABV UP PARAM F/U: HCPCS | Performed by: ORTHOPAEDIC SURGERY

## 2020-10-05 PROCEDURE — 99214 OFFICE O/P EST MOD 30 MIN: CPT | Performed by: ORTHOPAEDIC SURGERY

## 2020-10-05 PROCEDURE — G8427 DOCREV CUR MEDS BY ELIG CLIN: HCPCS | Performed by: ORTHOPAEDIC SURGERY

## 2020-10-05 NOTE — PROGRESS NOTES
expected risks benefits and likely outcome of each were discussed in detail, questions asked and answered and understood. We discussed the symptoms well as physical findings and imaging results. Her knee is worn very badly and definitive treatment would be knee replacement arthroplasty. This was discussed in detail including the surgery, risks, prognosis, limitations and rehab issues. Patient indicated good understanding. She had the left knee replaced some years ago. PLAN: Right total knee replacement arthroplasty. The patient was counseled at length about the risks of katherin Covid-19 during their perioperative period and any recovery window from their procedure. The patient was made aware that katherin Covid-19  may worsen their prognosis for recovering from their procedure  and lend to a higher morbidity and/or mortality risk. All material risks, benefits, and reasonable alternatives including postponing the procedure were discussed. The patient does wish to proceed with the procedure at this time. Patient Active Problem List   Diagnosis    Degenerative arthritis of hip R    Primary osteoarthritis of left knee    Essential hypertension    Familial hypercholesterolemia    Osteoporosis    Spinal stenosis of lumbar region    Lumbar radiculopathy    DDD (degenerative disc disease), lumbar    Synovial cyst of lumbar facet joint       Past Medical History:   Diagnosis Date    Arthritis     right hip    Cancer ([address]     breast 1997 approx. left    Hyperlipidemia     Hypertension     Osteoarthrosis     Osteoporosis     Other chronic postoperative pain     Undiagnosed cardiac murmurs        Past Surgical History:   Procedure Laterality Date    ANESTHESIA NERVE BLOCK Left 4/30/2019    LUMBAR EPIDURAL STERIOD INJECTION L4-5  (LEFT PARAMEDIAN APPROACH) performed by Michael Johnston MD at [address] [place] [address]      biopsied on left breast 9/10/1998     Years since quittin.0    Smokeless tobacco: Never Used    Tobacco comment: quit smoking approx 20 yrs ago   Substance and Sexual Activity    Alcohol use: Yes     Comment: occasionally    Drug use: No    Sexual activity: None   Lifestyle    Physical activity     Days per week: None     Minutes per session: None    Stress: None   Relationships    Social connections     Talks on phone: None     Gets together: None     Attends [employer] service: None     Active member of club or organization: None     Attends meetings of clubs or organizations: None     Relationship status: None    Intimate partner violence     Fear of current or ex partner: None     Emotionally abused: None     Physically abused: None     Forced sexual activity: None   Other Topics Concern    None   Social History Narrative    None       History reviewed. No pertinent family history. Review of Systems:   As follows except as previously noted in HPI:  Constitutional: Negative for chills, diaphoresis,  fever   Respiratory: Negative for cough, shortness of breath and wheezing. Cardiovascular: Negative for chest pain and palpitations. Neurological: Negative for dizziness, syncope,   GI / : abdominal pain or cramping  Musculoskeletal: see HPI       Objective:   Physical Exam   Constitutional: Oriented to person, place, and time. and appears well-developed and well-nourished. :   Head: Normocephalic and atraumatic. Neck: Neck supple. Eyes: EOM are normal.   Pulmonary/Chest: Effort normal.  No respiratory distress, no wheezes. Neurological: Alert and oriented to person  Skin: Skin is warm and dry. Maximo Meckel, DO    10/5/20  11:49 AM    All reasonable efforts have been made to minimize the risk of errors that may occur in the use of voice recognition and other electronic means of charting.

## 2020-10-22 ENCOUNTER — HOSPITAL ENCOUNTER (OUTPATIENT)
Age: 80
Discharge: HOME | End: 2020-10-22
Payer: MEDICARE

## 2020-10-22 LAB
ANION GAP SERPL CALCULATED.3IONS-SCNC: 9 MMOL/L (ref 7–16)
BUN BLDV-MCNC: 21 MG/DL (ref 8–23)
CALCIUM SERPL-MCNC: 9.8 MG/DL (ref 8.6–10.2)
CHLORIDE BLD-SCNC: 102 MMOL/L (ref 98–107)
CO2: 29 MMOL/L (ref 22–29)
CREAT SERPL-MCNC: 0.9 MG/DL (ref 0.5–1)
GFR AFRICAN AMERICAN: >60
GFR NON-AFRICAN AMERICAN: 60 ML/MIN/1.73
GLUCOSE BLD-MCNC: 106 MG/DL (ref 74–99)
HBA1C MFR BLD: 5.9 % (ref 4–5.6)
HCT VFR BLD CALC: 43.4 % (ref 34–48)
HEMOGLOBIN: 14.1 G/DL (ref 11.5–15.5)
MCH RBC QN AUTO: 29.2 PG (ref 26–35)
MCHC RBC AUTO-ENTMCNC: 32.5 % (ref 32–34.5)
MCV RBC AUTO: 89.9 FL (ref 80–99.9)
PDW BLD-RTO: 14.1 FL (ref 11.5–15)
PLATELET # BLD: 230 E9/L (ref 130–450)
PMV BLD AUTO: 10.8 FL (ref 7–12)
POTASSIUM SERPL-SCNC: 4.5 MMOL/L (ref 3.5–5)
RBC # BLD: 4.83 E12/L (ref 3.5–5.5)
SODIUM BLD-SCNC: 140 MMOL/L (ref 132–146)
VITAMIN D 25-HYDROXY: 58 NG/ML (ref 30–100)
WBC # BLD: 6.9 E9/L (ref 4.5–11.5)

## 2020-10-22 PROCEDURE — 82306 VITAMIN D 25 HYDROXY: CPT

## 2020-10-22 PROCEDURE — 85027 COMPLETE CBC AUTOMATED: CPT

## 2020-10-22 PROCEDURE — 83036 HEMOGLOBIN GLYCOSYLATED A1C: CPT

## 2020-10-22 PROCEDURE — 36415 COLL VENOUS BLD VENIPUNCTURE: CPT

## 2020-10-22 PROCEDURE — 80048 BASIC METABOLIC PNL TOTAL CA: CPT

## 2020-10-23 RX ORDER — SODIUM CHLORIDE, SODIUM LACTATE, POTASSIUM CHLORIDE, CALCIUM CHLORIDE 600; 310; 30; 20 MG/100ML; MG/100ML; MG/100ML; MG/100ML
INJECTION, SOLUTION INTRAVENOUS CONTINUOUS
Status: CANCELLED | OUTPATIENT
Start: 2020-10-23

## 2020-10-23 RX ORDER — SODIUM CHLORIDE 0.9 % (FLUSH) 0.9 %
10 SYRINGE (ML) INJECTION PRN
Status: CANCELLED | OUTPATIENT
Start: 2020-10-23

## 2020-10-23 RX ORDER — SODIUM CHLORIDE 0.9 % (FLUSH) 0.9 %
10 SYRINGE (ML) INJECTION EVERY 12 HOURS SCHEDULED
Status: CANCELLED | OUTPATIENT
Start: 2020-10-23

## 2020-10-26 ENCOUNTER — ANESTHESIA EVENT (OUTPATIENT)
Dept: OPERATING ROOM | Age: 80
End: 2020-10-26
Payer: MEDICARE

## 2020-10-26 ENCOUNTER — HOSPITAL ENCOUNTER (OUTPATIENT)
Dept: PREADMISSION TESTING | Age: 80
Discharge: HOME | End: 2020-10-26
Payer: MEDICARE

## 2020-10-26 VITALS
WEIGHT: 182.8 LBS | SYSTOLIC BLOOD PRESSURE: 147 MMHG | OXYGEN SATURATION: 95 % | DIASTOLIC BLOOD PRESSURE: 67 MMHG | HEIGHT: 68 IN | RESPIRATION RATE: 20 BRPM | BODY MASS INDEX: 27.71 KG/M2 | TEMPERATURE: 97.3 F

## 2020-10-26 LAB
ABO/RH: NORMAL
ANTIBODY SCREEN: NORMAL
ANTIBODY SCREEN: NORMAL
APTT: 31.3 SEC (ref 24.5–35.1)
INR BLD: 1
PROTHROMBIN TIME: 11.3 SEC (ref 9.3–12.4)

## 2020-10-26 PROCEDURE — 36415 COLL VENOUS BLD VENIPUNCTURE: CPT

## 2020-10-26 PROCEDURE — 86901 BLOOD TYPING SEROLOGIC RH(D): CPT

## 2020-10-26 PROCEDURE — 85730 THROMBOPLASTIN TIME PARTIAL: CPT

## 2020-10-26 PROCEDURE — 86900 BLOOD TYPING SEROLOGIC ABO: CPT

## 2020-10-26 PROCEDURE — 86850 RBC ANTIBODY SCREEN: CPT

## 2020-10-26 PROCEDURE — 85610 PROTHROMBIN TIME: CPT

## 2020-10-26 PROCEDURE — 87081 CULTURE SCREEN ONLY: CPT

## 2020-10-26 ASSESSMENT — PAIN DESCRIPTION - DESCRIPTORS: DESCRIPTORS: ACHING

## 2020-10-26 ASSESSMENT — PAIN DESCRIPTION - LOCATION: LOCATION: KNEE

## 2020-10-26 ASSESSMENT — PAIN DESCRIPTION - FREQUENCY: FREQUENCY: INTERMITTENT

## 2020-10-26 ASSESSMENT — PAIN DESCRIPTION - PAIN TYPE: TYPE: CHRONIC PAIN

## 2020-10-26 ASSESSMENT — ENCOUNTER SYMPTOMS: SHORTNESS OF BREATH: 0

## 2020-10-26 ASSESSMENT — PAIN DESCRIPTION - ORIENTATION: ORIENTATION: RIGHT

## 2020-10-26 ASSESSMENT — PAIN SCALES - GENERAL: PAINLEVEL_OUTOF10: 4

## 2020-10-26 NOTE — PROGRESS NOTES
Patient attended preoperative Total Joint Camp on 10/26/2020 during her preadmission testing appointment. Patient is scheduled to have an elective knee replacement. Patient was educated regarding Disease Process, Medications, Smoking Cessation, Oxygenation, Incentive Spirometry and Deep Breath and Cough, signs and symptoms of postoperative joint infection that include: Fever, Chills, Pain Control, Drainage and Redness, post-op follow up with orthopaedic surgeon, dressing removal, steri strips, ambulatory devices which include a standard walker and cane, bed mobility, correct anatomical alignment, active range of motion, proper transferring technique, incision care, infection prevention measures, non-pharmacologic comfort measures, notification of inadequate pain control measures, pain scale for assessing level of pain, pharmacologic pain management, relaxation techniques.

## 2020-10-26 NOTE — PROGRESS NOTES
[address] [facility]                                                                                                                    PRE OP INSTRUCTIONS FOR  Erasmo Smallwood        Date: 10/26/2020    Date of surgery:  Tuesday 11/3/20  Arrival Time: Hospital will call you between 5pm and 7pm with your final arrival time for surgery    1. Do not eat or drink anything after  midnigh prior to surgery. This includes no water, chewing gum, mints or ice chips. 2. Take the following medications with a small sip of water on the morning of Surgery:  Take your am lodipine        3. Aspirin, Ibuprofen, Advil, Naproxen, Vitamin E and other Anti-inflammatory products should be stopped  before surgery  as directed by your physician. Take Tylenol only unless instructed otherwise by your surgeon. 4. Do not smoke,use illicit drugs and do not drink any alcoholic beverages 24 hours prior to surgery. 5. You may brush your teeth the morning of surgery. DO NOT SWALLOW WATER    6. You MUST make arrangements for a responsible adult to take you home after your surgery. You will not be allowed to leave alone or drive yourself home. It is strongly suggested someone stay with you the first 24 hrs. Your surgery will be cancelled if you do not have a ride home. 7. Please wear simple, loose fitting clothing to the hospital.  Armando Jaramilloam not bring valuables (money, credit cards, checkbooks, etc.) Do not wear any makeup (including no eye makeup) or nail polish on your fingers or toes. 8. DO NOT wear any jewelry or piercings on day of surgery. All body piercing jewelry must be removed. 9. Shower the night before surgery with ___Antibacterial soap /HORTENSIA WIPES________    10. TOTAL JOINT REPLACEMENT/HYSTERECTOMY PATIENTS ONLY---Remember to bring Blood Bank bracelet to the hospital on the day of surgery. 11. If you have a Living Will and Durable Power of  for Healthcare, please bring in a copy.     12. If appropriate bring crutches, inspirex, WALKER, CANE etc...    15. Notify your Surgeon if you develop any illness between now and surgery time, cough, cold, fever, sore throat, nausea, vomiting, etc.  Please notify your surgeon if you experience dizziness, shortness of breath or blurred vision between now & the time of your surgery. 14. If you have ___dentures, they will be removed before going to the OR; we will provide you a container. If you wear ___contact lenses or ___glasses, they will be removed; please bring a case for them. 15. To provide excellent care visitors will be limited to 2 in the room at any given time. 16. Please bring picture ID and insurance card. 17. Sleep apnea patients need to bring CPAP AND SETTINGS to hospital on day of surgery. 18. During flu season no children under the age of 15 are permitted in the hospital for the safety of all patients. 19. Othe                 Please call AMBULATORY CARE if you have any further questions.    [address] [facility]     [address]

## 2020-10-26 NOTE — ANESTHESIA PRE PROCEDURE
Department of Anesthesiology  Preprocedure Note       Name:  Mitra Hackett   Age:  79 y.o.  :  1940                                          MRN:  [id number]         Date:  10/26/2020      Surgeon: Cecilia Lo):  CHI Del Rosario DO    Procedure: Procedure(s):  TOTAL RIGHT KNEE REPLACEMENT/ ARTHROPLASTY (BERNABE)    Medications prior to admission:   Prior to Admission medications    Medication Sig Start Date End Date Taking? Authorizing Provider   aspirin 81 MG tablet Take 81 mg by mouth nightly. Yes Historical Provider, MD   amLODIPine (NORVASC) 5 MG tablet Take 1 tablet by mouth daily 10/7/20   Harsha Bradley DO   atorvastatin (LIPITOR) 40 MG tablet Take 1 tablet by mouth daily Mon,wed,fri 1/10/19   Harsha Bradley DO   vitamin D (CHOLECALCIFEROL) 1000 UNITS TABS tablet Take 2,000 Units by mouth daily    Historical Provider, MD   Red Yeast Rice 600 MG CAPS Take 2 capsules by mouth three times a week    Historical Provider, MD   Multiple Vitamins-Minerals (CENTRUM SILVER ADULT 50+ PO) Take  by mouth daily. Historical Provider, MD   calcium-vitamin D (OSCAL 500/200 D-3) 500-200 MG-UNIT per tablet Take 1 tablet by mouth 2 times daily. Historical Provider, MD   NONFORMULARY daily. Tunde Steven    Historical Provider, MD       Current medications:    Current Outpatient Medications   Medication Sig Dispense Refill    aspirin 81 MG tablet Take 81 mg by mouth nightly.  amLODIPine (NORVASC) 5 MG tablet Take 1 tablet by mouth daily 90 tablet 1    atorvastatin (LIPITOR) 40 MG tablet Take 1 tablet by mouth daily Mon,wed,fri 90 tablet 1    vitamin D (CHOLECALCIFEROL) 1000 UNITS TABS tablet Take 2,000 Units by mouth daily      Red Yeast Rice 600 MG CAPS Take 2 capsules by mouth three times a week      Multiple Vitamins-Minerals (CENTRUM SILVER ADULT 50+ PO) Take  by mouth daily.  calcium-vitamin D (OSCAL 500/200 D-3) 500-200 MG-UNIT per tablet Take 1 tablet by mouth 2 times daily.       NONFORMULARY daily. Tunde Red       No current facility-administered medications for this encounter. Allergies:  No Known Allergies    Problem List:    Patient Active Problem List   Diagnosis Code    Degenerative arthritis of hip R M16.9    Primary osteoarthritis of left knee M17.12    Essential hypertension I10    Familial hypercholesterolemia E78.01    Osteoporosis M81.0    Spinal stenosis of lumbar region M48.061    Lumbar radiculopathy M54.16    DDD (degenerative disc disease), lumbar M51.36    Synovial cyst of lumbar facet joint M71.38       Past Medical History:        Diagnosis Date    Arthritis     right hip    Cancer ([address]     breast  approx. left    Hyperlipidemia     Hypertension     Osteoarthrosis     Osteoporosis     Other chronic postoperative pain     Undiagnosed cardiac murmurs        Past Surgical History:        Procedure Laterality Date    ANESTHESIA NERVE BLOCK Left 2019    LUMBAR EPIDURAL STERIOD INJECTION L4-5  (LEFT PARAMEDIAN APPROACH) performed by Singh Dee MD at [address] [facility]      biopsied on left breast    COLONOSCOPY      EPIDURAL STEROID INJECTION Left 3/19/2019    LEFT LUMBAR TRANSFORAMINAL EPIDURAL STEROID INJECTION: L4 / L5 performed by Singh Dee MD at [address]      cataracts OU    FRACTURE SURGERY      right ankle    HYSTERECTOMY  1979    JOINT REPLACEMENT      left hip.r hip    NERVE BLOCK Left 2019    left lumbar transforaminal epidural    NERVE BLOCK Left 2019    lumbar    TONSILLECTOMY      TOTAL HIP ARTHROPLASTY Right Oct 2013    with cell saver    TOTAL KNEE ARTHROPLASTY Left 3/8/16       Social History:    Social History     Tobacco Use    Smoking status: Former Smoker     Packs/day: 0.25     Years: 3.00     Pack years: 0.75     Last attempt to quit: 9/10/1998     Years since quittin.1    Smokeless tobacco: Never Used    Tobacco comment: quit smoking approx 20 yrs ago   Substance Use Topics    Alcohol use: Yes     Comment: occasionally                                Counseling given: Not Answered  Comment: quit smoking approx 20 yrs ago      Vital Signs (Current):   Vitals:    10/26/20 0835   BP: (!) 147/67   Resp: 20   Temp: 97.3 °F (36.3 °C)   TempSrc: Temporal   SpO2: 95%   Weight: 182 lb 12.8 oz (82.9 kg)   Height: 5' 8\" (1.727 m)                                              BP Readings from Last 3 Encounters:   10/26/20 (!) 147/67   10/21/20 134/72   10/07/20 136/84       NPO Status:                                                                                 BMI:   Wt Readings from Last 3 Encounters:   10/26/20 182 lb 12.8 oz (82.9 kg)   10/21/20 180 lb 14.4 oz (82.1 kg)   10/07/20 180 lb 14.4 oz (82.1 kg)     Body mass index is 27.79 kg/m². CBC:   Lab Results   Component Value Date    WBC 6.9 10/22/2020    RBC 4.83 10/22/2020    HGB 14.1 10/22/2020    HCT 43.4 10/22/2020    MCV 89.9 10/22/2020    RDW 14.1 10/22/2020     10/22/2020       CMP:   Lab Results   Component Value Date     10/22/2020    K 4.5 10/22/2020     10/22/2020    CO2 29 10/22/2020    BUN 21 10/22/2020    CREATININE 0.9 10/22/2020    GFRAA >60 10/22/2020    LABGLOM 60 10/22/2020    GLUCOSE 106 10/22/2020    GLUCOSE 87 01/30/2012    PROT 7.4 06/08/2020    CALCIUM 9.8 10/22/2020    BILITOT 0.5 06/08/2020    ALKPHOS 43 06/08/2020    AST 19 06/08/2020    ALT 22 06/08/2020       POC Tests: No results for input(s): POCGLU, POCNA, POCK, POCCL, POCBUN, POCHEMO, POCHCT in the last 72 hours.     Coags:   Lab Results   Component Value Date    PROTIME 12.1 02/29/2016    INR 1.1 02/29/2016    APTT 29.5 02/29/2016       HCG (If Applicable): No results found for: PREGTESTUR, PREGSERUM, HCG, HCGQUANT     ABGs: No results found for: PHART, PO2ART, EGT7ZNS, STG0KDX, BEART, C2CTPPVT     Type & Screen (If Applicable):  No results found for: LABABO, [address]    Drug/Infectious Status (If Applicable):  No results found for: HIV, HEPCAB    COVID-19 Screening (If Applicable): No results found for: COVID19      Anesthesia Evaluation  Patient summary reviewed no history of anesthetic complications:   Airway: Mallampati: II  TM distance: >3 FB   Neck ROM: full  Mouth opening: > = 3 FB Dental: normal exam         Pulmonary: breath sounds clear to auscultation      (-) COPD and shortness of breath                          ROS comment: Former smoker   Cardiovascular:  Exercise tolerance: good (>4 METS),   (+) hypertension: moderate, hyperlipidemia    (-) past MI and CAD    ECG reviewed  Rhythm: regular  Rate: normal                    Neuro/Psych:   (+) neuromuscular disease:,             GI/Hepatic/Renal:        (-) liver disease and no renal disease       Endo/Other:    (+) : arthritis: OA., .    (-) diabetes mellitus, hypothyroidism        Pt had PAT visit. Abdominal:           Vascular: negative vascular ROS. Anesthesia Plan      spinal     ASA 3     (Patient agreed to a right adductor canal block)  Induction: intravenous. MIPS: Postoperative opioids intended and Prophylactic antiemetics administered. Anesthetic plan and risks discussed with patient.                       Jane Acevedo MD   10/26/2020

## 2020-10-27 LAB — MRSA CULTURE ONLY: NORMAL

## 2020-10-28 ENCOUNTER — HOSPITAL ENCOUNTER (OUTPATIENT)
Age: 80
Discharge: HOME | End: 2020-10-30
Payer: MEDICARE

## 2020-10-28 PROCEDURE — U0003 INFECTIOUS AGENT DETECTION BY NUCLEIC ACID (DNA OR RNA); SEVERE ACUTE RESPIRATORY SYNDROME CORONAVIRUS 2 (SARS-COV-2) (CORONAVIRUS DISEASE [COVID-19]), AMPLIFIED PROBE TECHNIQUE, MAKING USE OF HIGH THROUGHPUT TECHNOLOGIES AS DESCRIBED BY CMS-2020-01-R: HCPCS

## 2020-10-30 LAB
SARS-COV-2: NOT DETECTED
SOURCE: NORMAL

## 2020-11-03 ENCOUNTER — ANESTHESIA (OUTPATIENT)
Dept: OPERATING ROOM | Age: 80
End: 2020-11-03
Payer: MEDICARE

## 2020-11-03 ENCOUNTER — HOSPITAL ENCOUNTER (OUTPATIENT)
Age: 80
Setting detail: OBSERVATION
Discharge: HOME HEALTH | End: 2020-11-04
Attending: ORTHOPAEDIC SURGERY | Admitting: ORTHOPAEDIC SURGERY
Payer: MEDICARE

## 2020-11-03 ENCOUNTER — APPOINTMENT (OUTPATIENT)
Dept: GENERAL RADIOLOGY | Age: 80
End: 2020-11-03
Attending: ORTHOPAEDIC SURGERY
Payer: MEDICARE

## 2020-11-03 VITALS
OXYGEN SATURATION: 92 % | TEMPERATURE: 97 F | DIASTOLIC BLOOD PRESSURE: 77 MMHG | SYSTOLIC BLOOD PRESSURE: 133 MMHG | RESPIRATION RATE: 17 BRPM

## 2020-11-03 PROBLEM — M17.11 ARTHRITIS OF RIGHT KNEE: Status: ACTIVE | Noted: 2020-11-03

## 2020-11-03 PROCEDURE — 6360000002 HC RX W HCPCS: Performed by: ORTHOPAEDIC SURGERY

## 2020-11-03 PROCEDURE — 6370000000 HC RX 637 (ALT 250 FOR IP): Performed by: ORTHOPAEDIC SURGERY

## 2020-11-03 PROCEDURE — 97110 THERAPEUTIC EXERCISES: CPT | Performed by: PHYSICAL THERAPIST

## 2020-11-03 PROCEDURE — 64447 NJX AA&/STRD FEMORAL NRV IMG: CPT | Performed by: ANESTHESIOLOGY

## 2020-11-03 PROCEDURE — 2500000003 HC RX 250 WO HCPCS

## 2020-11-03 PROCEDURE — C1776 JOINT DEVICE (IMPLANTABLE): HCPCS | Performed by: ORTHOPAEDIC SURGERY

## 2020-11-03 PROCEDURE — 2580000003 HC RX 258: Performed by: ORTHOPAEDIC SURGERY

## 2020-11-03 PROCEDURE — 3700000001 HC ADD 15 MINUTES (ANESTHESIA): Performed by: ORTHOPAEDIC SURGERY

## 2020-11-03 PROCEDURE — G0378 HOSPITAL OBSERVATION PER HR: HCPCS

## 2020-11-03 PROCEDURE — 3700000000 HC ANESTHESIA ATTENDED CARE: Performed by: ORTHOPAEDIC SURGERY

## 2020-11-03 PROCEDURE — 2580000003 HC RX 258

## 2020-11-03 PROCEDURE — 88305 TISSUE EXAM BY PATHOLOGIST: CPT

## 2020-11-03 PROCEDURE — 73560 X-RAY EXAM OF KNEE 1 OR 2: CPT

## 2020-11-03 PROCEDURE — 6360000002 HC RX W HCPCS

## 2020-11-03 PROCEDURE — 2500000003 HC RX 250 WO HCPCS: Performed by: ORTHOPAEDIC SURGERY

## 2020-11-03 PROCEDURE — 7100000001 HC PACU RECOVERY - ADDTL 15 MIN: Performed by: ORTHOPAEDIC SURGERY

## 2020-11-03 PROCEDURE — 97161 PT EVAL LOW COMPLEX 20 MIN: CPT | Performed by: PHYSICAL THERAPIST

## 2020-11-03 PROCEDURE — 97530 THERAPEUTIC ACTIVITIES: CPT | Performed by: PHYSICAL THERAPIST

## 2020-11-03 PROCEDURE — 3600000015 HC SURGERY LEVEL 5 ADDTL 15MIN: Performed by: ORTHOPAEDIC SURGERY

## 2020-11-03 PROCEDURE — 2580000003 HC RX 258: Performed by: ANESTHESIOLOGY

## 2020-11-03 PROCEDURE — 2709999900 HC NON-CHARGEABLE SUPPLY: Performed by: ORTHOPAEDIC SURGERY

## 2020-11-03 PROCEDURE — 27447 TOTAL KNEE ARTHROPLASTY: CPT | Performed by: ORTHOPAEDIC SURGERY

## 2020-11-03 PROCEDURE — 3600000005 HC SURGERY LEVEL 5 BASE: Performed by: ORTHOPAEDIC SURGERY

## 2020-11-03 PROCEDURE — 7100000000 HC PACU RECOVERY - FIRST 15 MIN: Performed by: ORTHOPAEDIC SURGERY

## 2020-11-03 PROCEDURE — 88311 DECALCIFY TISSUE: CPT

## 2020-11-03 PROCEDURE — C1713 ANCHOR/SCREW BN/BN,TIS/BN: HCPCS | Performed by: ORTHOPAEDIC SURGERY

## 2020-11-03 DEVICE — INSERT TIB PS 5 14 MM POLYETH TRIATHLON X3: Type: IMPLANTABLE DEVICE | Site: KNEE | Status: FUNCTIONAL

## 2020-11-03 DEVICE — STEM TIB L50MM DIA12MM KNEE TOT STBL CEM END CAP TRIATHLON: Type: IMPLANTABLE DEVICE | Site: KNEE | Status: FUNCTIONAL

## 2020-11-03 DEVICE — IMPLANT PAT DIA29MM THK8MM X3 SYMMETRIC TRIATHLON: Type: IMPLANTABLE DEVICE | Site: PATELLA | Status: FUNCTIONAL

## 2020-11-03 DEVICE — CEMENT BNE 20ML 40GM FULL DOSE PMMA W/O ANTIBIO M VISC: Type: IMPLANTABLE DEVICE | Site: KNEE | Status: FUNCTIONAL

## 2020-11-03 DEVICE — COMPONENT FEM SZ 6 R KNEE POST STBL CEM TRIATHLON: Type: IMPLANTABLE DEVICE | Site: KNEE | Status: FUNCTIONAL

## 2020-11-03 DEVICE — UPCHARGE KNEE X3 PATELLA STRYKER: Type: IMPLANTABLE DEVICE | Site: KNEE | Status: FUNCTIONAL

## 2020-11-03 DEVICE — BASEPLATE TIB SZ 5 UNIV KNEE TRITANIUM TOT STBL CEM: Type: IMPLANTABLE DEVICE | Site: KNEE | Status: FUNCTIONAL

## 2020-11-03 DEVICE — UPCHARGE KNEE X3 LINER STRYKER: Type: IMPLANTABLE DEVICE | Site: KNEE | Status: FUNCTIONAL

## 2020-11-03 DEVICE — COMPONENT PART KNEE CAPPED K1 STRYKER: Type: IMPLANTABLE DEVICE | Site: KNEE | Status: FUNCTIONAL

## 2020-11-03 RX ORDER — MIDAZOLAM HYDROCHLORIDE 1 MG/ML
INJECTION INTRAMUSCULAR; INTRAVENOUS
Status: COMPLETED
Start: 2020-11-03 | End: 2020-11-03

## 2020-11-03 RX ORDER — ROPIVACAINE HYDROCHLORIDE 5 MG/ML
INJECTION, SOLUTION EPIDURAL; INFILTRATION; PERINEURAL
Status: COMPLETED
Start: 2020-11-03 | End: 2020-11-03

## 2020-11-03 RX ORDER — FENTANYL CITRATE 50 UG/ML
INJECTION, SOLUTION INTRAMUSCULAR; INTRAVENOUS PRN
Status: DISCONTINUED | OUTPATIENT
Start: 2020-11-03 | End: 2020-11-03 | Stop reason: SDUPTHER

## 2020-11-03 RX ORDER — OXYCODONE HYDROCHLORIDE AND ACETAMINOPHEN 5; 325 MG/1; MG/1
1 TABLET ORAL EVERY 6 HOURS PRN
Qty: 28 TABLET | Refills: 0 | Status: SHIPPED | OUTPATIENT
Start: 2020-11-03 | End: 2020-11-10

## 2020-11-03 RX ORDER — PROPOFOL 10 MG/ML
INJECTION, EMULSION INTRAVENOUS CONTINUOUS PRN
Status: DISCONTINUED | OUTPATIENT
Start: 2020-11-03 | End: 2020-11-03 | Stop reason: SDUPTHER

## 2020-11-03 RX ORDER — SODIUM CHLORIDE, SODIUM LACTATE, POTASSIUM CHLORIDE, CALCIUM CHLORIDE 600; 310; 30; 20 MG/100ML; MG/100ML; MG/100ML; MG/100ML
INJECTION, SOLUTION INTRAVENOUS CONTINUOUS
Status: DISCONTINUED | OUTPATIENT
Start: 2020-11-03 | End: 2020-11-03

## 2020-11-03 RX ORDER — OXYCODONE HYDROCHLORIDE 5 MG/1
10 TABLET ORAL EVERY 4 HOURS PRN
Status: DISCONTINUED | OUTPATIENT
Start: 2020-11-03 | End: 2020-11-04 | Stop reason: HOSPADM

## 2020-11-03 RX ORDER — SENNA AND DOCUSATE SODIUM 50; 8.6 MG/1; MG/1
1 TABLET, FILM COATED ORAL 2 TIMES DAILY
Status: DISCONTINUED | OUTPATIENT
Start: 2020-11-03 | End: 2020-11-04 | Stop reason: HOSPADM

## 2020-11-03 RX ORDER — ATORVASTATIN CALCIUM 40 MG/1
40 TABLET, FILM COATED ORAL DAILY
Status: DISCONTINUED | OUTPATIENT
Start: 2020-11-03 | End: 2020-11-04 | Stop reason: HOSPADM

## 2020-11-03 RX ORDER — FENTANYL CITRATE 50 UG/ML
INJECTION, SOLUTION INTRAMUSCULAR; INTRAVENOUS
Status: COMPLETED
Start: 2020-11-03 | End: 2020-11-03

## 2020-11-03 RX ORDER — ASPIRIN 325 MG
325 TABLET, DELAYED RELEASE (ENTERIC COATED) ORAL 2 TIMES DAILY
Qty: 56 TABLET | Refills: 0 | Status: SHIPPED | OUTPATIENT
Start: 2020-11-03 | End: 2022-01-18 | Stop reason: ALTCHOICE

## 2020-11-03 RX ORDER — SODIUM CHLORIDE 0.9 % (FLUSH) 0.9 %
10 SYRINGE (ML) INJECTION PRN
Status: DISCONTINUED | OUTPATIENT
Start: 2020-11-03 | End: 2020-11-03 | Stop reason: HOSPADM

## 2020-11-03 RX ORDER — LIDOCAINE HYDROCHLORIDE 20 MG/ML
INJECTION, SOLUTION INTRAVENOUS PRN
Status: DISCONTINUED | OUTPATIENT
Start: 2020-11-03 | End: 2020-11-03 | Stop reason: SDUPTHER

## 2020-11-03 RX ORDER — SODIUM CHLORIDE 0.9 % (FLUSH) 0.9 %
10 SYRINGE (ML) INJECTION EVERY 12 HOURS SCHEDULED
Status: DISCONTINUED | OUTPATIENT
Start: 2020-11-03 | End: 2020-11-03 | Stop reason: HOSPADM

## 2020-11-03 RX ORDER — M-VIT,TX,IRON,MINS/CALC/FOLIC 27MG-0.4MG
1 TABLET ORAL DAILY
Status: DISCONTINUED | OUTPATIENT
Start: 2020-11-03 | End: 2020-11-04 | Stop reason: HOSPADM

## 2020-11-03 RX ORDER — TRANEXAMIC ACID 100 MG/ML
INJECTION, SOLUTION INTRAVENOUS PRN
Status: DISCONTINUED | OUTPATIENT
Start: 2020-11-03 | End: 2020-11-03 | Stop reason: ALTCHOICE

## 2020-11-03 RX ORDER — ACETAMINOPHEN 325 MG/1
650 TABLET ORAL EVERY 6 HOURS
Status: DISCONTINUED | OUTPATIENT
Start: 2020-11-03 | End: 2020-11-04 | Stop reason: HOSPADM

## 2020-11-03 RX ORDER — SODIUM CHLORIDE, SODIUM LACTATE, POTASSIUM CHLORIDE, CALCIUM CHLORIDE 600; 310; 30; 20 MG/100ML; MG/100ML; MG/100ML; MG/100ML
INJECTION, SOLUTION INTRAVENOUS CONTINUOUS PRN
Status: DISCONTINUED | OUTPATIENT
Start: 2020-11-03 | End: 2020-11-03 | Stop reason: SDUPTHER

## 2020-11-03 RX ORDER — MORPHINE SULFATE 2 MG/ML
2 INJECTION, SOLUTION INTRAMUSCULAR; INTRAVENOUS
Status: DISCONTINUED | OUTPATIENT
Start: 2020-11-03 | End: 2020-11-04 | Stop reason: HOSPADM

## 2020-11-03 RX ORDER — GABAPENTIN 300 MG/1
300 CAPSULE ORAL ONCE
Status: COMPLETED | OUTPATIENT
Start: 2020-11-03 | End: 2020-11-03

## 2020-11-03 RX ORDER — BUPIVACAINE HYDROCHLORIDE 2.5 MG/ML
INJECTION, SOLUTION EPIDURAL; INFILTRATION; INTRACAUDAL PRN
Status: DISCONTINUED | OUTPATIENT
Start: 2020-11-03 | End: 2020-11-03 | Stop reason: ALTCHOICE

## 2020-11-03 RX ORDER — CELECOXIB 100 MG/1
100 CAPSULE ORAL ONCE
Status: COMPLETED | OUTPATIENT
Start: 2020-11-03 | End: 2020-11-03

## 2020-11-03 RX ORDER — MIDAZOLAM HYDROCHLORIDE 1 MG/ML
INJECTION INTRAMUSCULAR; INTRAVENOUS PRN
Status: DISCONTINUED | OUTPATIENT
Start: 2020-11-03 | End: 2020-11-03 | Stop reason: SDUPTHER

## 2020-11-03 RX ORDER — BUPIVACAINE HYDROCHLORIDE 7.5 MG/ML
INJECTION, SOLUTION INTRASPINAL PRN
Status: DISCONTINUED | OUTPATIENT
Start: 2020-11-03 | End: 2020-11-03 | Stop reason: SDUPTHER

## 2020-11-03 RX ORDER — DEXTROSE AND SODIUM CHLORIDE 5; .45 G/100ML; G/100ML
INJECTION, SOLUTION INTRAVENOUS CONTINUOUS
Status: DISCONTINUED | OUTPATIENT
Start: 2020-11-03 | End: 2020-11-04 | Stop reason: HOSPADM

## 2020-11-03 RX ORDER — MORPHINE SULFATE 4 MG/ML
4 INJECTION, SOLUTION INTRAMUSCULAR; INTRAVENOUS
Status: DISCONTINUED | OUTPATIENT
Start: 2020-11-03 | End: 2020-11-04 | Stop reason: HOSPADM

## 2020-11-03 RX ORDER — ROPIVACAINE HYDROCHLORIDE 5 MG/ML
INJECTION, SOLUTION EPIDURAL; INFILTRATION; PERINEURAL
Status: COMPLETED | OUTPATIENT
Start: 2020-11-03 | End: 2020-11-03

## 2020-11-03 RX ORDER — ACETAMINOPHEN 500 MG
1000 TABLET ORAL ONCE
Status: COMPLETED | OUTPATIENT
Start: 2020-11-03 | End: 2020-11-03

## 2020-11-03 RX ORDER — LIDOCAINE HYDROCHLORIDE 10 MG/ML
INJECTION, SOLUTION EPIDURAL; INFILTRATION; INTRACAUDAL; PERINEURAL PRN
Status: DISCONTINUED | OUTPATIENT
Start: 2020-11-03 | End: 2020-11-03 | Stop reason: SDUPTHER

## 2020-11-03 RX ORDER — AMLODIPINE BESYLATE 5 MG/1
5 TABLET ORAL DAILY
Status: DISCONTINUED | OUTPATIENT
Start: 2020-11-04 | End: 2020-11-04 | Stop reason: HOSPADM

## 2020-11-03 RX ORDER — SODIUM CHLORIDE 0.9 % (FLUSH) 0.9 %
10 SYRINGE (ML) INJECTION EVERY 12 HOURS SCHEDULED
Status: DISCONTINUED | OUTPATIENT
Start: 2020-11-03 | End: 2020-11-04 | Stop reason: HOSPADM

## 2020-11-03 RX ORDER — OXYCODONE HYDROCHLORIDE 5 MG/1
5 TABLET ORAL EVERY 4 HOURS PRN
Status: DISCONTINUED | OUTPATIENT
Start: 2020-11-03 | End: 2020-11-04 | Stop reason: HOSPADM

## 2020-11-03 RX ORDER — ASPIRIN 81 MG/1
81 TABLET ORAL NIGHTLY
Status: DISCONTINUED | OUTPATIENT
Start: 2020-11-03 | End: 2020-11-04 | Stop reason: HOSPADM

## 2020-11-03 RX ORDER — VANCOMYCIN HYDROCHLORIDE 1 G/20ML
INJECTION, POWDER, LYOPHILIZED, FOR SOLUTION INTRAVENOUS PRN
Status: DISCONTINUED | OUTPATIENT
Start: 2020-11-03 | End: 2020-11-03 | Stop reason: ALTCHOICE

## 2020-11-03 RX ORDER — DEXAMETHASONE SODIUM PHOSPHATE 10 MG/ML
8 INJECTION INTRAMUSCULAR; INTRAVENOUS ONCE
Status: COMPLETED | OUTPATIENT
Start: 2020-11-03 | End: 2020-11-03

## 2020-11-03 RX ORDER — SODIUM CHLORIDE 0.9 % (FLUSH) 0.9 %
10 SYRINGE (ML) INJECTION PRN
Status: DISCONTINUED | OUTPATIENT
Start: 2020-11-03 | End: 2020-11-04 | Stop reason: HOSPADM

## 2020-11-03 RX ADMIN — BUPIVACAINE HYDROCHLORIDE IN DEXTROSE 1.8 ML: 7.5 INJECTION, SOLUTION SUBARACHNOID at 10:58

## 2020-11-03 RX ADMIN — FENTANYL CITRATE 50 MCG: 50 INJECTION, SOLUTION INTRAMUSCULAR; INTRAVENOUS at 09:37

## 2020-11-03 RX ADMIN — DEXAMETHASONE SODIUM PHOSPHATE 8 MG: 10 INJECTION INTRAMUSCULAR; INTRAVENOUS at 08:31

## 2020-11-03 RX ADMIN — MIDAZOLAM 1 MG: 1 INJECTION INTRAMUSCULAR; INTRAVENOUS at 09:37

## 2020-11-03 RX ADMIN — FENTANYL CITRATE 25 MCG: 50 INJECTION, SOLUTION INTRAMUSCULAR; INTRAVENOUS at 10:58

## 2020-11-03 RX ADMIN — DEXTROSE AND SODIUM CHLORIDE: 5; 450 INJECTION, SOLUTION INTRAVENOUS at 16:46

## 2020-11-03 RX ADMIN — Medication 2 G: at 11:02

## 2020-11-03 RX ADMIN — PHENYLEPHRINE HYDROCHLORIDE 50 MCG: 10 INJECTION INTRAVENOUS at 11:34

## 2020-11-03 RX ADMIN — LIDOCAINE HYDROCHLORIDE 40 MG: 20 INJECTION, SOLUTION INTRAVENOUS at 10:59

## 2020-11-03 RX ADMIN — Medication 2 G: at 14:05

## 2020-11-03 RX ADMIN — ROPIVACAINE HYDROCHLORIDE 30 ML: 5 INJECTION, SOLUTION EPIDURAL; INFILTRATION; PERINEURAL at 09:57

## 2020-11-03 RX ADMIN — ASPIRIN 81 MG: 81 TABLET, COATED ORAL at 21:03

## 2020-11-03 RX ADMIN — SODIUM CHLORIDE, POTASSIUM CHLORIDE, SODIUM LACTATE AND CALCIUM CHLORIDE: 600; 310; 30; 20 INJECTION, SOLUTION INTRAVENOUS at 12:18

## 2020-11-03 RX ADMIN — CELECOXIB 100 MG: 100 CAPSULE ORAL at 08:30

## 2020-11-03 RX ADMIN — PHENYLEPHRINE HYDROCHLORIDE 100 MCG: 10 INJECTION INTRAVENOUS at 11:13

## 2020-11-03 RX ADMIN — LIDOCAINE HYDROCHLORIDE 2 ML: 10 INJECTION, SOLUTION EPIDURAL; INFILTRATION; INTRACAUDAL; PERINEURAL at 10:52

## 2020-11-03 RX ADMIN — ACETAMINOPHEN 650 MG: 325 TABLET, FILM COATED ORAL at 21:07

## 2020-11-03 RX ADMIN — SODIUM CHLORIDE, POTASSIUM CHLORIDE, SODIUM LACTATE AND CALCIUM CHLORIDE: 600; 310; 30; 20 INJECTION, SOLUTION INTRAVENOUS at 08:48

## 2020-11-03 RX ADMIN — DOCUSATE SODIUM 50 MG AND SENNOSIDES 8.6 MG 1 TABLET: 8.6; 5 TABLET, FILM COATED ORAL at 21:03

## 2020-11-03 RX ADMIN — SODIUM CHLORIDE, POTASSIUM CHLORIDE, SODIUM LACTATE AND CALCIUM CHLORIDE: 600; 310; 30; 20 INJECTION, SOLUTION INTRAVENOUS at 10:48

## 2020-11-03 RX ADMIN — ATORVASTATIN CALCIUM 40 MG: 40 TABLET, FILM COATED ORAL at 16:46

## 2020-11-03 RX ADMIN — OXYCODONE HYDROCHLORIDE 10 MG: 5 TABLET ORAL at 21:08

## 2020-11-03 RX ADMIN — Medication 2 G: at 21:08

## 2020-11-03 RX ADMIN — PHENYLEPHRINE HYDROCHLORIDE 100 MCG: 10 INJECTION INTRAVENOUS at 11:18

## 2020-11-03 RX ADMIN — PROPOFOL INJECTABLE EMULSION 100 MCG/KG/MIN: 10 INJECTION, EMULSION INTRAVENOUS at 10:59

## 2020-11-03 RX ADMIN — PHENYLEPHRINE HYDROCHLORIDE 50 MCG: 10 INJECTION INTRAVENOUS at 11:27

## 2020-11-03 RX ADMIN — ACETAMINOPHEN 1000 MG: 500 TABLET, FILM COATED ORAL at 08:30

## 2020-11-03 RX ADMIN — ACETAMINOPHEN 650 MG: 325 TABLET, FILM COATED ORAL at 16:45

## 2020-11-03 RX ADMIN — GABAPENTIN 300 MG: 300 CAPSULE ORAL at 08:30

## 2020-11-03 ASSESSMENT — PAIN SCALES - GENERAL
PAINLEVEL_OUTOF10: 0
PAINLEVEL_OUTOF10: 0
PAINLEVEL_OUTOF10: 7
PAINLEVEL_OUTOF10: 0
PAINLEVEL_OUTOF10: 1
PAINLEVEL_OUTOF10: 0
PAINLEVEL_OUTOF10: 0
PAINLEVEL_OUTOF10: 6
PAINLEVEL_OUTOF10: 0

## 2020-11-03 ASSESSMENT — PULMONARY FUNCTION TESTS
PIF_VALUE: 1
PIF_VALUE: 0
PIF_VALUE: 1
PIF_VALUE: 0
PIF_VALUE: 0
PIF_VALUE: 1
PIF_VALUE: 0
PIF_VALUE: 1
PIF_VALUE: 0
PIF_VALUE: 1
PIF_VALUE: 0
PIF_VALUE: 1
PIF_VALUE: 0
PIF_VALUE: 1
PIF_VALUE: 0
PIF_VALUE: 1
PIF_VALUE: 0
PIF_VALUE: 1
PIF_VALUE: 0
PIF_VALUE: 1
PIF_VALUE: 0
PIF_VALUE: 1
PIF_VALUE: 0
PIF_VALUE: 1
PIF_VALUE: 1

## 2020-11-03 ASSESSMENT — PAIN DESCRIPTION - FREQUENCY: FREQUENCY: INTERMITTENT

## 2020-11-03 ASSESSMENT — PAIN DESCRIPTION - PAIN TYPE: TYPE: SURGICAL PAIN

## 2020-11-03 ASSESSMENT — PAIN DESCRIPTION - PROGRESSION: CLINICAL_PROGRESSION: NOT CHANGED

## 2020-11-03 ASSESSMENT — PAIN DESCRIPTION - DESCRIPTORS: DESCRIPTORS: ACHING;DISCOMFORT;SORE

## 2020-11-03 ASSESSMENT — PAIN - FUNCTIONAL ASSESSMENT
PAIN_FUNCTIONAL_ASSESSMENT: 0-10
PAIN_FUNCTIONAL_ASSESSMENT: PREVENTS OR INTERFERES SOME ACTIVE ACTIVITIES AND ADLS

## 2020-11-03 ASSESSMENT — PAIN DESCRIPTION - ORIENTATION: ORIENTATION: RIGHT

## 2020-11-03 ASSESSMENT — PAIN DESCRIPTION - LOCATION: LOCATION: KNEE

## 2020-11-03 ASSESSMENT — ENCOUNTER SYMPTOMS: SHORTNESS OF BREATH: 0

## 2020-11-03 ASSESSMENT — PAIN DESCRIPTION - ONSET: ONSET: ON-GOING

## 2020-11-03 NOTE — ANESTHESIA PRE PROCEDURE
Department of Anesthesiology  Preprocedure Note       Name:  Shauna Doherty   Age:  79 y.o.  :  1940                                          MRN:  [id number]         Date:  11/3/2020      Surgeon: Coretta Watson):  CHI Rosales DO    Procedure: Procedure(s):  TOTAL RIGHT KNEE REPLACEMENT/ ARTHROPLASTY (BERNABE)    Medications prior to admission:   Prior to Admission medications    Medication Sig Start Date End Date Taking? Authorizing Provider   amLODIPine (NORVASC) 5 MG tablet Take 1 tablet by mouth daily 10/7/20   Lauren Tellez DO   atorvastatin (LIPITOR) 40 MG tablet Take 1 tablet by mouth daily Mon,wed,fri 1/10/19   Lauren Tellez DO   vitamin D (CHOLECALCIFEROL) 1000 UNITS TABS tablet Take 2,000 Units by mouth daily    Historical Provider, MD   Red Yeast Rice 600 MG CAPS Take 2 capsules by mouth three times a week    Historical Provider, MD   Multiple Vitamins-Minerals (CENTRUM SILVER ADULT 50+ PO) Take  by mouth daily. Historical Provider, MD   calcium-vitamin D (OSCAL 500/200 D-3) 500-200 MG-UNIT per tablet Take 1 tablet by mouth 2 times daily. Historical Provider, MD   NONFORMULARY daily. Tunde Steven    Historical Provider, MD   aspirin 81 MG tablet Take 81 mg by mouth nightly. Historical Provider, MD       Current medications:    No current facility-administered medications for this visit. No current outpatient medications on file.      Facility-Administered Medications Ordered in Other Visits   Medication Dose Route Frequency Provider Last Rate Last Dose    sodium chloride flush 0.9 % injection 10 mL  10 mL Intravenous 2 times per day CHI Rosales DO        sodium chloride flush 0.9 % injection 10 mL  10 mL Intravenous PRN CHI Rosales DO        acetaminophen (TYLENOL) tablet 1,000 mg  1,000 mg Oral Once CHI Rosales DO        gabapentin (NEURONTIN) capsule 300 mg  300 mg Oral Once CHI Rosales DO        celecoxib (CELEBREX) capsule 100 mg  100 mg Oral Once K Elizabeth Trevino, DO        dexamethasone (DECADRON) injection 8 mg  8 mg Intravenous Once K Elizabeth Trevino, DO        ceFAZolin (ANCEF) 2 g in sterile water 20 mL IV syringe  2 g Intravenous On Call to [facility], DO        tranexamic acid (CYKLOKAPRON) irrigation 1,000 mg  1,000 mg Intra-articular On Call to [facility], DO        HYDROmorphone (DILAUDID) injection 0.5 mg  0.5 mg Intravenous Q5 Min PRN Sonia Gallagher MD           Allergies:  No Known Allergies    Problem List:    Patient Active Problem List   Diagnosis Code    Degenerative arthritis of hip R M16.9    Primary osteoarthritis of left knee M17.12    Essential hypertension I10    Familial hypercholesterolemia E78.01    Osteoporosis M81.0    Spinal stenosis of lumbar region M48.061    Lumbar radiculopathy M54.16    DDD (degenerative disc disease), lumbar M51.36    Synovial cyst of lumbar facet joint M71.38    Arthritis of right knee M17.11       Past Medical History:        Diagnosis Date    Arthritis     right hip    Cancer ([address]     breast 1997 approx. left    Hyperlipidemia     Hypertension     Osteoarthrosis     Osteoporosis     Other chronic postoperative pain     Undiagnosed cardiac murmurs        Past Surgical History:        Procedure Laterality Date    ANESTHESIA NERVE BLOCK Left 4/30/2019    LUMBAR EPIDURAL STERIOD INJECTION L4-5  (LEFT PARAMEDIAN APPROACH) performed by Rocío Silverio MD at [facility]      biopsied on left breast    COLONOSCOPY      EPIDURAL STEROID INJECTION Left 3/19/2019    LEFT LUMBAR TRANSFORAMINAL EPIDURAL STEROID INJECTION: L4 / L5 performed by Rocío Silverio MD at [address] [facility]      cataracts OU    FRACTURE SURGERY      right ankle    HYSTERECTOMY  1979    JOINT REPLACEMENT      left hip.r hip    NERVE BLOCK Left 03/19/2019    left lumbar transforaminal epidural    NERVE BLOCK Left 04/30/2019 lumbar    TONSILLECTOMY      TOTAL HIP ARTHROPLASTY Right Oct 2013    with cell saver    TOTAL KNEE ARTHROPLASTY Left 3/8/16       Social History:    Social History     Tobacco Use    Smoking status: Former Smoker     Packs/day: 0.25     Years: 3.00     Pack years: 0.75     Last attempt to quit: 9/10/1998     Years since quittin.1    Smokeless tobacco: Never Used    Tobacco comment: quit smoking approx 20 yrs ago   Substance Use Topics    Alcohol use: Yes     Comment: occasionally                                Counseling given: Not Answered  Comment: quit smoking approx 20 yrs ago      Vital Signs (Current): There were no vitals filed for this visit. BP Readings from Last 3 Encounters:   10/26/20 (!) 147/67   10/21/20 134/72   10/07/20 136/84       NPO Status:                                                                                 BMI:   Wt Readings from Last 3 Encounters:   10/26/20 182 lb 12.8 oz (82.9 kg)   10/21/20 180 lb 14.4 oz (82.1 kg)   10/07/20 180 lb 14.4 oz (82.1 kg)     There is no height or weight on file to calculate BMI.    CBC:   Lab Results   Component Value Date    WBC 6.9 10/22/2020    RBC 4.83 10/22/2020    HGB 14.1 10/22/2020    HCT 43.4 10/22/2020    MCV 89.9 10/22/2020    RDW 14.1 10/22/2020     10/22/2020       CMP:   Lab Results   Component Value Date     10/22/2020    K 4.5 10/22/2020     10/22/2020    CO2 29 10/22/2020    BUN 21 10/22/2020    CREATININE 0.9 10/22/2020    GFRAA >60 10/22/2020    LABGLOM 60 10/22/2020    GLUCOSE 106 10/22/2020    GLUCOSE 87 2012    PROT 7.4 2020    CALCIUM 9.8 10/22/2020    BILITOT 0.5 2020    ALKPHOS 43 2020    AST 19 2020    ALT 22 2020       POC Tests: No results for input(s): POCGLU, POCNA, POCK, POCCL, POCBUN, POCHEMO, POCHCT in the last 72 hours.     Coags:   Lab Results   Component Value Date    PROTIME 11.3 10/26/2020    INR 1.0 10/26/2020    APTT 31.3 10/26/2020       HCG (If Applicable): No results found for: PREGTESTUR, PREGSERUM, HCG, HCGQUANT     ABGs: No results found for: PHART, PO2ART, GQZ4IRV, NDA5OAR, BEART, F1OMYQVJ     Type & Screen (If Applicable):  No results found for: LABABO, LABRH    Drug/Infectious Status (If Applicable):  No results found for: HIV, HEPCAB    COVID-19 Screening (If Applicable):   Lab Results   Component Value Date    COVID19 Not Detected 10/28/2020         Anesthesia Evaluation  Patient summary reviewed no history of anesthetic complications:   Airway: Mallampati: IV  TM distance: >3 FB   Neck ROM: full  Mouth opening: > = 3 FB Dental:          Pulmonary: breath sounds clear to auscultation      (-) COPD and shortness of breath                          ROS comment: Former smoker   Cardiovascular:  Exercise tolerance: good (>4 METS),   (+) hypertension: moderate, valvular problems/murmurs (Undiagnosed Murmur.):, hyperlipidemia    (-) past MI and CAD    ECG reviewed  Rhythm: regular  Rate: normal  Echocardiogram reviewed         Beta Blocker:  Not on Beta Blocker      ROS comment: EKG: Normal Sinus Rhythm 98. ECHO:  Normal Left Ventricle,   EF 61 %. Neuro/Psych:   (+) neuromuscular disease:,              ROS comment: Osteoporosis. GI/Hepatic/Renal:        (-) liver disease and no renal disease       Endo/Other:    (+) : arthritis (Arthritis Rt Hip.): OA., .    (-) diabetes mellitus, hypothyroidism        Pt had PAT visit. ROS comment: Cancer left Breast. Abdominal:           Vascular: negative vascular ROS. Anesthesia Plan      spinal     ASA 3     (Patient agreed to a right adductor canal block)  Induction: intravenous. BIS  MIPS: Postoperative opioids intended and Prophylactic antiemetics administered. Anesthetic plan and risks discussed with patient. Plan discussed with CRNA.     Attending anesthesiologist reviewed and agrees with Pre Eval Zeb Cuadra MD   11/3/2020

## 2020-11-03 NOTE — CARE COORDINATION
SS Note/Discharge plan:  Confirmed post-op d/c plan for pt to go to her daughter's home with [facility] [address] liaison notified and confirmed agency accepted referral for start of care on Thursday 11/5, dme ( w/w and bsc) ordered through [address] for hospital delivery in am, nursing informed. Electronically signed by SILVER Mir on 11/3/2020 at 3:46 PM

## 2020-11-03 NOTE — H&P
History and Physical      CHIEF COMPLAINT: Right knee pain crepitus and instability    HISTORY OF PRESENT ILLNESS:      Progressive worsening with deformity and altered gait    Past Medical History:        Diagnosis Date    Arthritis     right hip    Cancer ([address]     breast 1997 approx. left    Hyperlipidemia     Hypertension     Osteoarthrosis     Osteoporosis     Other chronic postoperative pain     Undiagnosed cardiac murmurs      Past Surgical History:        Procedure Laterality Date    ANESTHESIA NERVE BLOCK Left 4/30/2019    LUMBAR EPIDURAL STERIOD INJECTION L4-5  (LEFT PARAMEDIAN APPROACH) performed by Farshad Chapa MD at [facility]      biopsied on left breast    COLONOSCOPY      EPIDURAL STEROID INJECTION Left 3/19/2019    LEFT LUMBAR TRANSFORAMINAL EPIDURAL STEROID INJECTION: L4 / L5 performed by Farshad Chapa MD at [facility]      cataracts OU    FRACTURE SURGERY      right ankle    HYSTERECTOMY  1979    JOINT REPLACEMENT      left hip.r hip    NERVE BLOCK Left 03/19/2019    left lumbar transforaminal epidural    NERVE BLOCK Left 04/30/2019    lumbar    TONSILLECTOMY      TOTAL HIP ARTHROPLASTY Right Oct 2013    with cell saver    TOTAL KNEE ARTHROPLASTY Left 3/8/16     Social History:    TOBACCO:   reports that she quit smoking about 22 years ago. She has a 0.75 pack-year smoking history. She has never used smokeless tobacco.  ETOH:   reports current alcohol use. DRUGS:   reports no history of drug use. Family History:       Problem Relation Age of Onset    Heart Disease Mother      Medications Prior to Admission:  No medications prior to admission. Allergies:  Patient has no known allergies.     CONSTITUTIONAL:  negative for  chills and anorexia  HEENT:  negative for  tinnitus  RESPIRATORY:  negative for  dyspnea and cyanosis  CARDIOVASCULAR:  negative for  palpitations, syncope  GASTROINTESTINAL: negative for vomiting and hematemesis  GENITOURINARY:  negative for hematuria  ENDOCRINE:  negative for tremor  MUSCULOSKELETAL:  positive for  arthralgias, stiff joints and decreased range of motion  NEUROLOGICAL:  negative for seizures and syncope  BEHAVIOR/PSYCH:  negative for agitated and anxiety    PHYSICAL EXAM:  There were no vitals taken for this visit. General appearance:  awake, alert, cooperative, no apparent distress, and appears stated age  Neurologic:  Awake, alert, oriented to name, place and time. Cranial nerves II-XII are grossly intact. Motor is 5 out of 5 bilaterally. Cerebellar finger to nose, heel to shin intact. Sensory is intact.   Babinski down going, Romberg negative, and gait is normal.  Lungs:  No increased work of breathing, good air exchange, clear to auscultation bilaterally, no crackles or wheezing  Heart:  Normal apical impulse, regular rate and rhythm, normal S1 and S2, no S3 or S4, and no murmur noted  Abdomen:  normal bowel sounds  Skin: warm and dry, no rash or erythema  ENT: tympanic membrane, external ear and ear canal normal bilaterally, oropharynx clear and moist with normal mucous membranes  Musculoskeletal: Decreased range of motion right knee with crepitus and altered gait    General Labs:  CBC:   Lab Results   Component Value Date    WBC 6.9 10/22/2020    RBC 4.83 10/22/2020    HGB 14.1 10/22/2020    HCT 43.4 10/22/2020    MCV 89.9 10/22/2020    RDW 14.1 10/22/2020     10/22/2020     CMP:    Lab Results   Component Value Date     10/22/2020    K 4.5 10/22/2020     10/22/2020    CO2 29 10/22/2020    BUN 21 10/22/2020    PROT 7.4 06/08/2020     U/A:  No components found for: Rahel Rogelny, USPGRAV, UPH, UPROTEIN, UGLUCOSE, UKETONE, UBILI, UBLOOD, UNITRITE, UUROBIL, [place], [facility], [place], [facility], [place], [facility], [place]    Radiology: Advanced degeneration right knee    ASSESSMENT AND PLAN:    Total knee replacement arthroplasty      Electronically signed by Josephine Thomas DO on 11/3/2020 at 7:06 AM

## 2020-11-03 NOTE — PROGRESS NOTES
Physical Therapy Initial Evaluation    Room #:  0320/0320-01  Patient Name: Ashley Hilliard  YOB: 1940  MRN: [id number]    Referring Provider:  Salena Barron DO     Date of Service: 11/3/2020    Evaluating Physical Therapist:  Trista Stone, PT #[id number]       Diagnosis:   Preop testing [Z01.818]  Primary osteoarthritis of right knee [M17.11]  Arthritis of right knee [M17.11]    status post Right Total knee arthroplasty (TKA)    Patient Active Problem List   Diagnosis    Degenerative arthritis of hip R    Primary osteoarthritis of left knee    Essential hypertension    Familial hypercholesterolemia    Osteoporosis    Spinal stenosis of lumbar region    Lumbar radiculopathy    DDD (degenerative disc disease), lumbar    Synovial cyst of lumbar facet joint    Arthritis of right knee        Tentative placement recommendation: Home Health PT 5 days a week  Equipment recommendation: Wheeled Walker and Bedside commode      Prior Level of Function: Patient ambulated independently    Rehab Potential: good  for baseline    Past medical history:  Past Medical History:   Diagnosis Date    Arthritis     right hip    Cancer ([address]     breast 1997 approx. left    Hyperlipidemia     Hypertension     Osteoarthrosis     Osteoporosis     Other chronic postoperative pain     Undiagnosed cardiac murmurs      Past Surgical History:   Procedure Laterality Date    ANESTHESIA NERVE BLOCK Left 4/30/2019    LUMBAR EPIDURAL STERIOD INJECTION L4-5  (LEFT PARAMEDIAN APPROACH) performed by Joni Epley, MD at [facility]      biopsied on left breast    COLONOSCOPY      EPIDURAL STEROID INJECTION Left 3/19/2019    LEFT LUMBAR TRANSFORAMINAL EPIDURAL STEROID INJECTION: L4 / L5 performed by Joni Epley, MD at [address] [facility]      cataracts OU    FRACTURE SURGERY      right ankle    HYSTERECTOMY  1979    JOINT REPLACEMENT      L hip     JOINT REPLACEMENT  10/2013    R hip    JOINT REPLACEMENT  03/08/2016    L knee     JOINT REPLACEMENT  11/03/2020    R knee     NERVE BLOCK Left 03/19/2019    left lumbar transforaminal epidural    NERVE BLOCK Left 04/30/2019    lumbar    TONSILLECTOMY      TOTAL HIP ARTHROPLASTY Right Oct 2013    with cell saver    TOTAL KNEE ARTHROPLASTY Left 3/8/16       Precautions:   , falls and alarm , Right FWB (full weight bearing)     SUBJECTIVE:    Social history: Patient will be going to daughter Kalani's home [place] with 2 steps to enter with rail walk in shower  Patient lives alone   in a ranch home  with 3 steps  to enter bilateral Rail  Tub shower walk in shower in the basement     Equipment owned: [employer],        [address]   How much difficulty turning over in bed?: A Little  How much difficulty sitting down on / standing up from a chair with arms?: Unable  How much difficulty moving from lying on back to sitting on side of bed?: A Little  How much help from another person moving to and from a bed to a chair?: Total  How much help from another person needed to walk in hospital room?: Total  How much help from another person for climbing 3-5 steps with a railing?: Total  AM-PAC Inpatient Mobility Raw Score : 10  AM-PAC Inpatient T-Scale Score : 32.29  Mobility Inpatient CMS 0-100% Score: 76.75  Mobility Inpatient CMS G-Code Modifier : CL    Nursing cleared patient for PT evaluation. The admitting diagnosis and active problem list as listed above have been reviewed prior to the initiation of this evaluation. OBJECTIVE:   Initial Evaluation  Date: 11/03/2020 Treatment Date: Short Term/ Long Term   Goals   Was pt agreeable to Eval/treatment?  Yes     Pain Level  0/10  Right knee        Bed Mobility  Rolling: Minimal assist of 1    Supine to sit: Minimal assist of 1    Sit to supine: Minimal assist of 1    Scooting: Minimal assist of 1    Rolling: Independent    Supine incentive inspirometer. Patient performed ankle pumps, quad sets, glut sets x 15-20 each. Active assist heelslide, hip abduction/adduction, straight leg raise x 10 each    Sat edge of bed 15 minutes with Supervision  to increase dynamic sitting balance and activity tolerance. able to perform seated scoot toward Head of bed with moderate assist. Returned to bed with moderate asisst x 2 to scoot to Head of bed patient able to assist with Left lower extremity      Patient would benefit from continued skilled Physical Therapy to improve functional independence and quality of life. Patient's/ family goals   home to daughter's       Patient and or family understand(s) diagnosis, prognosis, and plan of care. ASSESSMENT: Patient exhibits decreased strength, balance, coordination impairing functional mobility. Impaired strength and numbness Right lower extremity limiting functional mobility     PLAN:    Physical Therapy care will be provided in accordance with the objectives noted above. Exercises and functional mobility practice will be used as well as appropriate assistive devices or modalities to obtain goals.  Patient and family education will also be administered as needed.    -Bed Mobility: Lower extremity exercises   -Sitting Balance: Incorporate reaching activities to activate trunk muscles   -Standing Balance: Perform strengthening exercises in standing to promote motor control with or without upper extremity support   -Transfers: Provide instruction on proper hand and foot position for adequate transfer of weight onto lower extremities and use of gait device  -Gait: Gait training, Standing activities to improve: base of support, weight shift, weight bearing  and Pregait training to emphasize: Sequencing , Base of support, Weight shift, Step to gait pattern, Device control, Upright, right knee extension in stance phase of gait and Safety   -Endurance: Utilize Supervised activities to increase level of endurance to allow for safe functional mobility including transfers and gait   -Stairs: Stair training with instruction on proper technique and hand placement on rail    Frequency of treatments: Patient will be seen  twice daily  for therapeutic exercise, functional retraining, endurance activities, balance exercises, family and patient education. Time in  1454  Time out  1526    Total Treatment Time  23 minutes    Evaluation time includes thorough review of current medical information, gathering information on past medical history/social history and prior level of function, completion of standardized testing/informal observation of tasks, assessment of data, and development of Plan of care and goals.      CPT codes:  Low Complexity PT evaluation (55767)  Therapeutic activities (30607)   13 minutes  1 unit(s)  Therapeutic exercises (96316)   10 minutes  0 unit(s)    Irina Alvarez, PT

## 2020-11-03 NOTE — ANESTHESIA PROCEDURE NOTES
Spinal Block    Patient location during procedure: OR  Start time: 11/3/2020 10:51 AM  End time: 11/3/2020 10:58 AM  Reason for block: primary anesthetic  Staffing  Anesthesiologist: More Hardy MD  Resident/CRNA: ARETHA Burch - CRNA  Other anesthesia staff: Edilberto Ayers RN  Performed: other anesthesia staff   Preanesthetic Checklist  Completed: patient identified, site marked, surgical consent, pre-op evaluation, timeout performed, IV checked, risks and benefits discussed, monitors and equipment checked, anesthesia consent given, oxygen available and patient being monitored  Spinal Block  Patient position: sitting  Prep: Betadine  Patient monitoring: cardiac monitor, continuous pulse ox and frequent blood pressure checks  Approach: midline  Location: L3/L4  Provider prep: mask and sterile gloves  Local infiltration: lidocaine  Dose: 0.5  Agent: bupivacaine  Adjuvant: fentanyl  Dose: 1.8  Dose: 1.8  Needle  Needle type: Pencan   Needle gauge: 25 G  Needle length: 3.5 in  Assessment  Sensory level: T10  Swirl obtained: Yes  CSF: clear  Attempts: 1  Hemodynamics: stable

## 2020-11-03 NOTE — ANESTHESIA PROCEDURE NOTES
Peripheral Block    Patient location during procedure: PACU  Start time: 11/3/2020 9:37 AM  End time: 11/3/2020 9:55 AM  Staffing  Anesthesiologist: Joann Duff MD  Other anesthesia staff: Rae Constantino RN  Performed: other anesthesia staff and anesthesiologist   Preanesthetic Checklist  Completed: patient identified, site marked, surgical consent, pre-op evaluation, timeout performed, IV checked, risks and benefits discussed, monitors and equipment checked, anesthesia consent given, oxygen available and patient being monitored  Peripheral Block  Patient position: supine  Prep: ChloraPrep  Patient monitoring: cardiac monitor, continuous pulse ox, frequent blood pressure checks and IV access  Block type: Femoral  Laterality: right  Injection technique: single-shot  Procedures: Doppler guided and ultrasound guided  Adductor canal  Provider prep: mask and sterile gloves  Needle  Needle type: combined needle/nerve stimulator   Needle gauge: 20 G  Needle length: 10 cm  Needle localization: anatomical landmarks and ultrasound guidance  Needle insertion depth: 6 cm  Assessment  Injection assessment: negative aspiration for heme, no paresthesia on injection and local visualized surrounding nerve on ultrasound  Paresthesia pain: none  Slow fractionated injection: yes  Hemodynamics: stable  Additional Notes  1 mg versed, 50 mcg fentanyl given prior to nerve block  Medications Administered  Ropivacaine (NAROPIN) 0.5% injection, 30 mL  Reason for block: post-op pain management

## 2020-11-03 NOTE — OP NOTE
meniscus cartilages were removed the remaining cruciate ligament debris was removed as well. IM drill hole was made for stabilization of the proximal cutting block placed roughly perpendicular to the long axis of the tibia. The appropriate position was established relative to the more medial side. The spacer blocks were used to check the flexion extension gaps and medial lateral balance. The bone cut was ultimately made the bone wafer was removed. Large osteophytes were removed from the posterior femur after flexing the knee more. The remainders of contracted soft tissues in the posterior meniscus and crucial area was debrided. The tensiometer was used to check flexion extension gap and the medial lateral balance. It was noted that the popliteus appeared to be ruptured at this point. This gave us a little bit of laxity in the lateral side particularly in flexion. Sizing was done on the tibia at 5 for best coverage without overhang. Appropriate rotation was established and the block was pinned. The 13 mm bearing was applied and then the femoral trial component. The knee was taken through range of motion. Full extension was achieved in flexion 110 degrees or more. The 14 mm bearing was then trialed and the knee still reach full extension and had a hard stop. Flexion was as before. The patella was prepared for the 29 mm symmetric bearing this was placed and capsules held closed with towel clips. Tracking and tension was good in the front of the knee. The patellar and femoral trials were removed. The tibial bearing was removed and the tibia was set up for the fin baseplate and a 12 mm x 50 mm cemented stem. The femur was then prepared for the posterior stabilized femoral component with the box cut. The knee was thoroughly irrigated and local anesthetic was injected surrounding soft tissues for postop pain control.   A bone plug was placed in the distal femoral canal.  The ends of the popliteus

## 2020-11-04 VITALS
TEMPERATURE: 97.8 F | HEIGHT: 68 IN | RESPIRATION RATE: 18 BRPM | HEART RATE: 92 BPM | OXYGEN SATURATION: 94 % | SYSTOLIC BLOOD PRESSURE: 113 MMHG | BODY MASS INDEX: 27.13 KG/M2 | DIASTOLIC BLOOD PRESSURE: 57 MMHG | WEIGHT: 179 LBS

## 2020-11-04 LAB
HCT VFR BLD CALC: 38.2 % (ref 34–48)
HEMOGLOBIN: 12.8 G/DL (ref 11.5–15.5)

## 2020-11-04 PROCEDURE — 6370000000 HC RX 637 (ALT 250 FOR IP): Performed by: ORTHOPAEDIC SURGERY

## 2020-11-04 PROCEDURE — 97535 SELF CARE MNGMENT TRAINING: CPT

## 2020-11-04 PROCEDURE — 97110 THERAPEUTIC EXERCISES: CPT

## 2020-11-04 PROCEDURE — 85018 HEMOGLOBIN: CPT

## 2020-11-04 PROCEDURE — G0378 HOSPITAL OBSERVATION PER HR: HCPCS

## 2020-11-04 PROCEDURE — 97165 OT EVAL LOW COMPLEX 30 MIN: CPT

## 2020-11-04 PROCEDURE — 97530 THERAPEUTIC ACTIVITIES: CPT

## 2020-11-04 PROCEDURE — 36415 COLL VENOUS BLD VENIPUNCTURE: CPT

## 2020-11-04 PROCEDURE — 85014 HEMATOCRIT: CPT

## 2020-11-04 RX ORDER — ONDANSETRON 2 MG/ML
4 INJECTION INTRAMUSCULAR; INTRAVENOUS EVERY 6 HOURS PRN
Status: DISCONTINUED | OUTPATIENT
Start: 2020-11-04 | End: 2020-11-04 | Stop reason: HOSPADM

## 2020-11-04 RX ADMIN — ACETAMINOPHEN 650 MG: 325 TABLET, FILM COATED ORAL at 05:30

## 2020-11-04 RX ADMIN — ASPIRIN 325 MG: 325 TABLET, COATED ORAL at 08:34

## 2020-11-04 RX ADMIN — ATORVASTATIN CALCIUM 40 MG: 40 TABLET, FILM COATED ORAL at 08:34

## 2020-11-04 RX ADMIN — MULTIPLE VITAMINS W/ MINERALS TAB 1 TABLET: TAB at 08:34

## 2020-11-04 RX ADMIN — OXYCODONE HYDROCHLORIDE 10 MG: 5 TABLET ORAL at 06:50

## 2020-11-04 RX ADMIN — DOCUSATE SODIUM 50 MG AND SENNOSIDES 8.6 MG 1 TABLET: 8.6; 5 TABLET, FILM COATED ORAL at 08:34

## 2020-11-04 RX ADMIN — OXYCODONE HYDROCHLORIDE 10 MG: 5 TABLET ORAL at 14:08

## 2020-11-04 ASSESSMENT — PAIN SCALES - GENERAL
PAINLEVEL_OUTOF10: 9
PAINLEVEL_OUTOF10: 0
PAINLEVEL_OUTOF10: 3
PAINLEVEL_OUTOF10: 3
PAINLEVEL_OUTOF10: 7

## 2020-11-04 NOTE — PROGRESS NOTES
OCCUPATIONAL THERAPY  Initial Evaluation  Date:2020  Patient Name: Sera French  MRN: [id number]  : 1940  ROOM #: 0320/0320-01     Referring Provider: DO Obi Hernandez OT: Anjel Desai OTR/L [id number]    Placement Recommendation: HHOT   Recommended Adaptive Equipment: wheeled walker, bedside commode     AMPAC   AM-PAC Daily Activity Inpatient   How much help for putting on and taking off regular lower body clothing?: A Little  How much help for Bathing?: A Little  How much help for Toileting?: A Little  How much help for putting on and taking off regular upper body clothing?: A Little  How much help for taking care of personal grooming?: A Little  How much help for eating meals?: None  AM-PAC Inpatient Daily Activity Raw Score: 19  AM-PAC Inpatient ADL T-Scale Score : 40.22  ADL Inpatient CMS 0-100% Score: 42.8  ADL Inpatient CMS G-Code Modifier : CK    Based on patient's functional performance as stated below and their level of assistance needed prior to admission, this therapist believes that the patient would benefit from skilled Occupational Therapy following their hospital stay in an effort to increase safety and independence with completion of ADL/IADL tasks for functional independence and quality of life. Diagnosis:   1. Preop testing    2. Primary osteoarthritis of right knee      Pertinent Medical History:   Past Medical History:   Diagnosis Date    Arthritis     right hip    Cancer ([address]     breast  approx. left    Hyperlipidemia     Hypertension     Osteoarthrosis     Osteoporosis     Other chronic postoperative pain     Undiagnosed cardiac murmurs         Precautions:  falls, full weight bearing: R LE d/t TKA  Pain Scale: Numeric Rate: 5/10 pain in R knee; Nursing notified.     Social history: alone but plans to reside with her daughter Jeffrey Acuña upon discharge        Drive: yes    Occupation: retired    Home architecture: Ar[place] Mosquera has a 1 story, 2 steps to enter with no rail, walk in shower. The patient's house is a 1 story, 3 steps to enter with bilateral rails, tub shower on 1st floor and walk in shower in the basement. PLOF: independent with BADL and independent with IADL, pt ambulated with none   Equipment owned: cane, sock aide   Cognition: A&O x 4; follows 3 step directions. good  Problem solving skills   good  Memory    good  Sequencing   good  Judgement/safety  Communication: intact   Visual perceptual skills: intact     Glasses: yes   Edema: yes, surgical extremity     Sensation: intact   Hand Dominance:  Left     X  Right     Left Right Comment   Passive range of motion [facility]     Active range of motion [facility]     Muscle Grade 4/5 4/5    /pinch Strength Intact  Intact       Functional Assessment:   Initial Evaluation Status Date:   11/4/2020 Treatment Status  Date: STGs = LTGs  Time frame: 5 - 14 days   Feeding Independent   Independent    Grooming Supervision   Independent    UB Dressing Supervision   Independent    LB Dressing Minimal Assist for lower body clothing management before and after toileting. Supervision to doff and don socks while seated in bedside chair without the need for adaptive equiment. Pt instructed in lower body dressing techniques. Independent    Bathing Minimal Assist  Modified [place]    Toileting Supervision for hygiene. Set up provided to use toileting wipes. Assistance to apply pad to underpants. Assistance to dispense soup while patient washed hands at sink level. Independent    Bed Mobility  Facilitated Supine to sit: Supervision   Scooting:Supervision  Sit to supine: N/T as pt was up in chair     Rolling: Supervision  Supine to sit: Independent   Scooting:Independent  Sit to supine: Independent   Rolling: Independent   Functional Transfers Supervision from EOB to wheeled walker. Moderate verbal cues for R knee extension.    Transfer training with verbal cues for hand placement throughout session to improve safety. Independent    Functional Mobility Minimal Assist with wheeled walker to improve balance to and from bathroom, verbal cues for walker sequence and safety. Modified [place]    Activity Tolerance Fair   Good      Balance:   Sitting balance at EOB, commode, and bedside chair to increase dynamic sitting balance and activities tolerance with Supervision     Standing with wheeled walker to improve balance   Endurance: fair     Comments: Upon arrival to the room the patient was supine. At end of the session, the patient was up in bedside chair. Call light and phone within reach. Pt required verbal cues and instruction as noted above for safe facilitation and completion of tasks. Therapist provided skilled monitoring of the patient's response during treatment session. Prior to and at the end of session, environmental modifications/line management completed for patients safety and efficiency of treatment session. OT services provided to include instruction/training on safety and adapted techniques for completion of transfers and ADL's. Overall, the patient demonstrates difficulties with completion of BADL's and IADL's. Factors contributing to these difficulties includes decreased knee flexion, decreased endurance and generalized weakness. Pt would benefit from continued skilled OT to increase independence with BADL's and IADL's. Treatment:    Bed mobility: Facilitated bed mobility with cues for proper body mechanics and sequencing to prepare for ADL completion. Functional transfers: Facilitated transfers from various surfaces with cues for body alignment, safety and hand placement. ADL completion: Self-care retraining for the above-mentioned ADLs; training on proper hand placement, safety technique, sequencing, and energy conservation techniques. Postural Balance: Sitting and standing balance retraining to improve righting reactions with postural changes during ADLs.   Skilled positioning: Proper positioning to improve interaction with environment, overall functioning and decrease/prevent edema and contractures. Evaluation Complexity:   · Low Complexity  · History: Brief history including review of medical records relating to the problem  · Exam: 1-3 performance Deficits  · Assistance/Modification: No assistance or modifications required to perform tasks. No comorbities affecting occupational performance. Assessment of current deficits:   Functional mobility [x]        ADLs [x]        Strength [x]      Cognition []  Functional transfers  [x]       IADLs [x]        Safety Awareness []      Endurance [x]  Fine Motor Coordination []       Balance [x]       Vision/perception []     Sensation []   Gross Motor Coordination []       ROM []         Delirium []                          Motor Control []    Plan of Care: OT 1-3x/week for 5-7 days PRN   ADL retraining/AE recommendations   Functional Transfer Training   Therapeutic Activity   Functional Mobility Training   Energy Conservation Techniques/Strategies   Positioning to Improve Functional [place], Safety, and Skin Integrity   Patient and/or Family Education to Increase Safety and Functional [place]  Therapeutic Exercise          Rehab Potential: Good for established goals developed with patient and family. Patient / Family Goal: return home to daughters house today      Patient and/or family were instructed on functional diagnosis, prognosis/goals and OT plan of care. Demonstrated fair understanding. Evaluation time includes thorough review of current medical information, gathering information on past medical history/social history and prior level of function, completion of standardized testing/informal observation of tasks, assessment of data, and development of POC/Goals.     Time In:8:32am    Time Out: 9:02am                  Min Units   OT Eval Low 18438 X     OT Eval Medium 16222     OT Eval High [id number]     OT Re-Eval [id number]          ADL/Self Care [id number] 15    Therapeutic Activities 14394 8    Therapeutic Ex 17255     Orthotic Management 13854     Neuro Re-Ed 93767     Non-Billable Time     TOTAL TIMED TREATMENT 23 1859 [facility] OTR/L [id number]

## 2020-11-04 NOTE — PROGRESS NOTES
Physical Therapy Treatment Note    Room #:  0320/0320-01  Patient Name: Deacon Verdugo  YOB: 1940  MRN: [id number]    Referring Provider:  Gale Emmanuel DO     Date of Service: 11/4/2020    Evaluating Physical Therapist:  Ella Pope, PT #[id number]       Diagnosis:   Preop testing [Z01.818]  Primary osteoarthritis of right knee [M17.11]  Arthritis of right knee [M17.11]  Arthritis of right knee [M17.11]    status post Right Total knee arthroplasty (TKA)    Patient Active Problem List   Diagnosis    Degenerative arthritis of hip R    Primary osteoarthritis of left knee    Essential hypertension    Familial hypercholesterolemia    Osteoporosis    Spinal stenosis of lumbar region    Lumbar radiculopathy    DDD (degenerative disc disease), lumbar    Synovial cyst of lumbar facet joint    Arthritis of right knee        Tentative placement recommendation: Home Health PT 5 days a week  Equipment recommendation: Wheeled Walker and Bedside commode      Prior Level of Function: Patient ambulated independently    Rehab Potential: good  for baseline    Past medical history:  Past Medical History:   Diagnosis Date    Arthritis     right hip    Cancer ([address]     breast 1997 approx. left    Hyperlipidemia     Hypertension     Osteoarthrosis     Osteoporosis     Other chronic postoperative pain     Undiagnosed cardiac murmurs      Past Surgical History:   Procedure Laterality Date    ANESTHESIA NERVE BLOCK Left 4/30/2019    LUMBAR EPIDURAL STERIOD INJECTION L4-5  (LEFT PARAMEDIAN APPROACH) performed by Ephraim Yusuf MD at [address] [place] [address]      biopsied on left breast    COLONOSCOPY      EPIDURAL STEROID INJECTION Left 3/19/2019    LEFT LUMBAR TRANSFORAMINAL EPIDURAL STEROID INJECTION: L4 / L5 performed by Ephraim Yusuf MD at [address]      right ankle   [address] [facility] assist of 1   Scooting: Minimal assist of 1    Rolling: Independent    Supine to sit: Independent    Sit to supine: Independent    Scooting: Independent     Transfers Sit to stand: Not assessed  attempted to stand however due to numbness unable to clear buttocks, unsafe at this time Sit to stand: Minimal assist of 1      Sit to stand: Modified Independent     Ambulation    not assessed   2 x 30 feet using  wheeled walker with Minimal assist of 1 V/C for sequencing, R knee ext in stance phase, upright posture, increased ARA/step length, and safety. 100 feet using  wheeled walker with Modified Independent    Stair negotiation: ascended and descended   Not assessed    14 steps using 2 rails with Minimal assist of 1. V/C for sequencing and safety.    2 steps 1 rail with supervision    ROM Within functional limits except Right knee 0-90°    Increase range of motion 10% of affected joints    Strength Left lower extremity 3-/5  Rightlower extremity 2+/5  Within functional limits   Balance Sitting EOB:  good     Dynamic Standing:    not assessed   Sitting EOB:  good   Dynamic Standing:  fair wheeled walker   Sitting EOB:  good    Dynamic Standing:  good wheeled walker      Patient is Alert & Oriented x person, place, time and situation and follows directions    Sensation:  Patient reports numbness and tingling to bilateral lower extremities right > left  Edema:  yes right lower extremity      Vitals:  Blood Pressure at rest   Blood Pressure during session     Heart Rate at rest  Heart Rate during session     SPO2 at rest  SPO2 during session  %     Patient education  Patient educated on role of Physical Therapy, risks of immobility, safety and plan of care, ankle pumps, quad set and glut set for edema control, blood clot prevention and weight bearing status    right knee extension in bed and during stance phase of gait     Patient response to education:   Pt verbalized understanding Pt demonstrated skill Pt requires Mobility: Lower extremity exercises   -Sitting Balance: Incorporate reaching activities to activate trunk muscles   -Standing Balance: Perform strengthening exercises in standing to promote motor control with or without upper extremity support   -Transfers: Provide instruction on proper hand and foot position for adequate transfer of weight onto lower extremities and use of gait device  -Gait: Gait training, Standing activities to improve: base of support, weight shift, weight bearing  and Pregait training to emphasize: Sequencing , Base of support, Weight shift, Step to gait pattern, Device control, Upright, right knee extension in stance phase of gait and Safety   -Endurance: Utilize Supervised activities to increase level of endurance to allow for safe functional mobility including transfers and gait   -Stairs: Stair training with instruction on proper technique and hand placement on rail    Frequency of treatments: Patient will be seen  twice daily  for therapeutic exercise, functional retraining, endurance activities, balance exercises, family and patient education. Time in 9:10  Time out 9:49    Total Treatment Time  39 minutes        CPT codes:    Therapeutic activities (63952)   28 minutes  2 unit(s)  Therapeutic exercises (25213)   11 minutes  1 unit(s)    Diallo Muhammad  [facility]  LIC # [id number]

## 2020-11-04 NOTE — PROGRESS NOTES
OT BEDSIDE TREATMENT NOTE      Date:2020  Patient Name: Norm Galaviz  MRN: [id number]  : 1940  Room: [address] [place] [address]     Referring Provider: Maximo Meckel, DO Evaluating OT: Mateo Villagomez OTR/L [id number]     Placement Recommendation: [facility]   Recommended Adaptive Equipment: wheeled walker, bedside commode   Comment: walker bag provided     [facility]   AM-PAC Daily Activity Inpatient   How much help for putting on and taking off regular lower body clothing?: A Little  How much help for Bathing?: A Little  How much help for Toileting?: A Little  How much help for putting on and taking off regular upper body clothing?: A Little  How much help for taking care of personal grooming?: A Little  How much help for eating meals?: None  AM-PAC Inpatient Daily Activity Raw Score: 19  AM-PAC Inpatient ADL T-Scale Score : 40.22  ADL Inpatient CMS 0-100% Score: 42.8  ADL Inpatient CMS G-Code Modifier : CK     Based on patient's functional performance as stated below and their level of assistance needed prior to admission, this therapist believes that the patient would benefit from skilled Occupational Therapy following their hospital stay in an effort to increase safety and independence with completion of ADL/IADL tasks for functional independence and quality of life.      Diagnosis:   1. Preop testing    2. Primary osteoarthritis of right knee       Pertinent Medical History:   Past Medical History        Past Medical History:   Diagnosis Date    Arthritis       right hip    Cancer ([facility] [address]       breast  approx. left    Hyperlipidemia      Hypertension      Osteoarthrosis      Osteoporosis      Other chronic postoperative pain      Undiagnosed cardiac murmurs              Precautions:  falls, full weight bearing: R LE d/t TKA  Pain Scale: Numeric Rate: 5/10 pain in R knee; Nursing notified.      Social history: alone but plans to reside with her daughter Heather Uribe) upon discharge                              Drive: yes                          Occupation: retired    Home architecture: [employer] has a 1 story, 2 steps to enter with no rail, walk in shower. The patient's house is a 1 story, 3 steps to enter with bilateral rails, tub shower on 1st floor and walk in shower in the basement. PLOF: independent with BADL and independent with IADL, pt ambulated with none   Equipment owned: cane, sock aide   Cognition: A&O x 4; follows 3 step directions. good  Problem solving skills              good  Memory               good  Sequencing              good  Judgement/safety  Communication: intact   Visual perceptual skills: intact                Glasses: yes   Edema: yes, surgical extremity     Sensation: intact   Hand Dominance:  Left     X  Right       Left Right Comment   Passive range of motion [facility]      Active range of motion [facility]      Muscle Grade 4/5 4/5     /pinch Strength Intact  Intact         Functional Assessment:    Initial Evaluation Status Date:   11/4/2020 Treatment Status  Date: 11/4/2020 STGs = LTGs  Time frame: 5 - 14 days   Feeding Independent   N/T Independent    Grooming Supervision  Supervision when standing sinkside in clinic to simulate grooming tasks; cuing for sequencing Independent    UB Dressing Supervision   N/T - pt had donned shirt prior to session Independent    LB Dressing Minimal Assist for lower body clothing management before and after toileting.      Supervision to doff and don socks while seated in bedside chair without the need for adaptive equiment.      Pt instructed in lower body dressing techniques.     Instruction on LE dressing technique; per pt report required min A for LE dressing to don pants prior to session Independent    Bathing Minimal Assist  Pt education/ instruction, demonstration and participation with use of DME in clinic for bathroom safety/safe transfers with min A for safety.   Pt states she has a walk in shower with shower seat Modified [place] #[id number]

## 2020-11-04 NOTE — CONSULTS
Department of Internal Medicine        HISTORY OF PRESENT ILLNESS:      The patient is a 79 y.o. female who presents with having elective right TKA. Patient is seen postop. Patient has expected postop discomfort. Patient denies any problem with chest pain, abdominal pain, nausea/vomiting, palpitations or unusual shortness of breath. Patient's hemoglobin postop was 12.8. Patient's current vital signs temperature 97.8 with heart rate in 90 and blood pressure 113/57. O2 sat was 93% on room air at rest.    Past Medical History:    Past Medical History:   Diagnosis Date    Arthritis     right hip    Cancer ([address]     breast 1997 approx. left    Hyperlipidemia     Hypertension     Osteoarthrosis     Osteoporosis     Other chronic postoperative pain     Undiagnosed cardiac murmurs      Past Surgical History:    Past Surgical History:   Procedure Laterality Date    ANESTHESIA NERVE BLOCK Left 4/30/2019    LUMBAR EPIDURAL STERIOD INJECTION L4-5  (LEFT PARAMEDIAN APPROACH) performed by Stephane Yoo MD at [facility]      biopsied on left breast    COLONOSCOPY      EPIDURAL STEROID INJECTION Left 3/19/2019    LEFT LUMBAR TRANSFORAMINAL EPIDURAL STEROID INJECTION: L4 / L5 performed by Stephane Yoo MD at [address] [facility]      cataracts OU    FRACTURE SURGERY      right ankle    HYSTERECTOMY  1979    JOINT REPLACEMENT      L hip     JOINT REPLACEMENT  10/2013    R hip    JOINT REPLACEMENT  03/08/2016    L knee     JOINT REPLACEMENT  11/03/2020    R knee     NERVE BLOCK Left 03/19/2019    left lumbar transforaminal epidural    NERVE BLOCK Left 04/30/2019    lumbar    TONSILLECTOMY      TOTAL HIP ARTHROPLASTY Right Oct 2013    with cell saver    TOTAL KNEE ARTHROPLASTY Left 3/8/16       Medications Prior to Admission:    @  Prior to Admission medications    Medication Sig Start Date End Date Taking?  Authorizing Provider   aspirin 325 Drug use: No    Sexual activity: Not on file   Lifestyle    Physical activity     Days per week: Not on file     Minutes per session: Not on file    Stress: Not on file   Relationships    Social connections     Talks on phone: Not on file     Gets together: Not on file     Attends Alevism service: Not on file     Active member of club or organization: Not on file     Attends meetings of clubs or organizations: Not on file     Relationship status: Not on file    Intimate partner violence     Fear of current or ex partner: Not on file     Emotionally abused: Not on file     Physically abused: Not on file     Forced sexual activity: Not on file   Other Topics Concern    Not on file   Social History Narrative    Not on file       Family History:   Family History   Problem Relation Age of Onset    Heart Disease Mother        REVIEW OF SYSTEMS:    Gen: Patient denies any lightheadedness or dizziness. No LOC or syncope. No fevers or chills. HEENT: No earache, sore throat or nasal congestion. Resp: Denies cough, hemoptysis or sputum production. Cardiac: Denies chest pain, SOB, diaphoresis or palpitations. GI: No nausea, vomiting, diarrhea or constipation. No melena or hematochezia. : No urinary complaints, dysuria, hematuria or frequency. MSK: + Right knee pain. No extremity weakness, paralysis or paresthesias. PHYSICAL EXAM:    Vitals:  BP (!) 113/57   Pulse 92   Temp 97.8 °F (36.6 °C) (Oral)   Resp 18   Ht 5' 8\" (1.727 m)   Wt 179 lb (81.2 kg)   SpO2 93%   BMI 27.22 kg/m²     General:  This is a 79 y.o. yo female who is alert and oriented in NAD  HEENT:  Head is normocephalic and atraumatic, PERRLA, EOMI, mucus membranes moist with no pharyngeal erythema or exudate. Neck:  Supple with no carotid bruits, JVD or thyromegaly.   No cervical adenopathy  CV:  Regular rate and rhythm, no murmurs  Lungs:  Clear to auscultation bilaterally with no wheezes, rales or rhonchi  Abdomen: Soft, nontender, +abdominal fat,nondistended, bowel sounds present  Extremities:  + Right knee edema postop, + trace lower leg edema bilaterally peripheral pulses intact bilaterally  Neuro:  Cranial nerves II-XII grossly intact; motor and sensory function intact with no focal deficits  Skin:  No rashes, lesions or wounds      DATA:  CBC with Differential:    Lab Results   Component Value Date    WBC 6.9 10/22/2020    RBC 4.83 10/22/2020    HGB 12.8 11/04/2020    HCT 38.2 11/04/2020     10/22/2020    MCV 89.9 10/22/2020    MCH 29.2 10/22/2020    MCHC 32.5 10/22/2020    RDW 14.1 10/22/2020    SEGSPCT 56 12/20/2012    LYMPHOPCT 34 12/20/2012    MONOPCT 6 12/20/2012    BASOPCT 1 12/20/2012    MONOSABS 0.35 12/20/2012    LYMPHSABS 1.93 12/20/2012    EOSABS 0.18 12/20/2012    BASOSABS 0.04 12/20/2012     CMP:    Lab Results   Component Value Date     10/22/2020    K 4.5 10/22/2020     10/22/2020    CO2 29 10/22/2020    BUN 21 10/22/2020    CREATININE 0.9 10/22/2020    GFRAA >60 10/22/2020    LABGLOM 60 10/22/2020    GLUCOSE 106 10/22/2020    GLUCOSE 87 01/30/2012    PROT 7.4 06/08/2020    LABALBU 4.3 06/08/2020    LABALBU 4.6 01/30/2012    CALCIUM 9.8 10/22/2020    BILITOT 0.5 06/08/2020    ALKPHOS 43 06/08/2020    AST 19 06/08/2020    ALT 22 06/08/2020     Magnesium:    Lab Results   Component Value Date    MG 2.2 01/30/2012     Phosphorus:  No results found for: PHOS  PT/INR:    Lab Results   Component Value Date    PROTIME 11.3 10/26/2020    INR 1.0 10/26/2020     Troponin:  No results found for: TROPONINI  U/A:  No results found for: NITRITE, COLORU, PROTEINU, PHUR, LABCAST, WBCUA, RBCUA, MUCUS, TRICHOMONAS, YEAST, BACTERIA, CLARITYU, SPECGRAV, LEUKOCYTESUR, UROBILINOGEN, BILIRUBINUR, BLOODU, GLUCOSEU, AMORPHOUS  ABG:  No results found for: PH, PCO2, PO2, HCO3, BE, THGB, TCO2, O2SAT  HgBA1c:    Lab Results   Component Value Date    LABA1C 5.9 10/22/2020     FLP:    Lab Results   Component Value Date

## 2020-11-04 NOTE — PLAN OF CARE
Problem: Falls - Risk of:  Goal: Will remain free from falls  Description: Will remain free from falls  11/3/2020 2243 by Caleb Reddy RN  Outcome: Met This Shift     Problem: Falls - Risk of:  Goal: Absence of physical injury  Description: Absence of physical injury  11/3/2020 2243 by Caleb Reddy RN  Outcome: Met This Shift     Problem: Mobility - Impaired:  Goal: Mobility will improve  Description: Mobility will improve  11/3/2020 2243 by Caleb Reddy RN  Outcome: Met This Shift     Problem: Infection - Surgical Site:  Goal: Will show no infection signs and symptoms  Description: Will show no infection signs and symptoms  11/3/2020 2243 by Caleb Reddy RN  Outcome: Met This Shift

## 2020-11-04 NOTE — DISCHARGE SUMMARY
Physician Discharge Summary     Patient ID:  Merlinda Penna  [id number]  40 y.o.  1940    Admit date: 11/3/2020    Discharge date and time: 11/4/2020  5:09 PM     Admitting Physician: Raven Pressley DO     Discharge Physician: Raven Pressley DO    Admission Diagnoses: Preop testing [Z01.818]  Primary osteoarthritis of right knee [M17.11]  Arthritis of right knee [M17.11]  Arthritis of right knee [M17.11]    Discharge Diagnoses: s/p right total Knee arthroplasty    Admission Condition: fair    Discharged Condition: fair    Hospital Course: as can be seen in the chart. The patient was admitted from the pre-operative holding area and underwent an uneventful course of right knee arthroplasty on 11/3/2020 by Raven Pressley DO. The patient was subsequently taken to the PACU and to the floor in stable condition. The patient continued antibiotics 24 hours postoperatively, as they received a dose of antibiotics preoperatively for infection prophylaxis. The patient was to begin physical therapy, WBAT, the day of surgery. The patient was also started on DVT prophylaxis. Blood counts were followed daily.  was consulted for D/C planning as the patient made appropriate gains with PT in regaining independent function. On POD 1, the patient was discharged to home in stable condition. Treatments: s/p right total Knee arthroplasty    Disposition: The patient was provided instructions to follow up with CHI Bennett DO in 3 weeks and to call the office for an appointment. Pt was to continue aspirin 325 BID for DVT prophylaxis as directed.     Signed:  Christiano Moreno  11/4/2020  6:17 PM

## 2020-11-04 NOTE — PROGRESS NOTES
CLINICAL PHARMACY NOTE: MEDS TO [address] Select Patient?: No  Total # of Prescriptions Filled: 1   The following medications were delivered to the patient:  · Oxycodone/apap 5-325  Total # of Interventions Completed: 3  Time Spent (min): 15    Additional Documentation:

## 2020-11-04 NOTE — ANESTHESIA POSTPROCEDURE EVALUATION
Department of Anesthesiology  Postprocedure Note    Patient: Mary Anne Mercado  MRN: [id number]  YOB: 1940  Date of evaluation: 11/4/2020  Time:  9:52 AM     Procedure Summary     Date:  11/03/20 Room / Location:  [address] [place] [address] / [address] [place] [address]    Anesthesia Start:  0325 Anesthesia Stop:  3411    Procedure:  TOTAL RIGHT KNEE REPLACEMENT/ ARTHROPLASTY (BERNABE) (Right Knee) Diagnosis:  (DEGENERATION RIGHT KNEE)    Surgeon:  Gwyn Garnett DO Responsible Provider:  Migdalia Parrish MD    Anesthesia Type:  spinal ASA Status:  3          Anesthesia Type: spinal    Alpa Phase I: Alpa Score: 9    Alpa Phase II:      Last vitals: Reviewed and per EMR flowsheets.        Anesthesia Post Evaluation    Patient location during evaluation: PACU  Patient participation: complete - patient participated  Level of consciousness: awake  Pain score: 0  Airway patency: patent  Nausea & Vomiting: no nausea  Complications: no  Cardiovascular status: blood pressure returned to baseline  Respiratory status: acceptable  Hydration status: euvolemic

## 2020-11-30 ENCOUNTER — OFFICE VISIT (OUTPATIENT)
Dept: ORTHOPEDIC SURGERY | Age: 80
End: 2020-11-30

## 2020-11-30 VITALS — BODY MASS INDEX: 27.13 KG/M2 | TEMPERATURE: 98 F | WEIGHT: 179 LBS | HEIGHT: 68 IN

## 2020-11-30 PROCEDURE — 99024 POSTOP FOLLOW-UP VISIT: CPT | Performed by: ORTHOPAEDIC SURGERY

## 2020-11-30 NOTE — PROGRESS NOTES
Chief Complaint:   Chief Complaint   Patient presents with    Knee Pain     f/u right knee TKA DOS: 11/3/2020       Codey Elizondo 4 weeks postop right total knee replacement arthroplasty. She is doing real well. She denies any problems. She says that in-home therapy has discharged her. She does not have plans to go to outpatient when questioned specifically. Allergies; medications; past medical, surgical, family, and social history; and problem list have been reviewed today and updated as indicated in this encounter seen below. Exam: She has elastic stockings on. There is edema in the leg and the knee. There are no signs of complications. The knee is not hot or red. The incision is healing very well. She has a range of motion of about 5degrees to 100 degrees with pliable endpoints. Radiographs: None    ASSESSMENT:    Loc Mayberry was seen today for knee pain. Diagnoses and all orders for this visit:    Primary osteoarthritis of right knee        PLAN: Diligent exercise at home was emphasized to regain the last extension and also improve flexion. She indicates that she will do this. We will follow-up in 3 weeks. She will continue to use the cane for extra stability and support    Return in about 3 weeks (around 12/21/2020). Current Outpatient Medications   Medication Sig Dispense Refill    aspirin 325 MG EC tablet Take 1 tablet by mouth 2 times daily for 28 days 56 tablet 0    amLODIPine (NORVASC) 5 MG tablet Take 1 tablet by mouth daily 90 tablet 1    atorvastatin (LIPITOR) 40 MG tablet Take 1 tablet by mouth daily Mon,wed,fri 90 tablet 1    vitamin D (CHOLECALCIFEROL) 1000 UNITS TABS tablet Take 2,000 Units by mouth daily      Red Yeast Rice 600 MG CAPS Take 2 capsules by mouth three times a week      Multiple Vitamins-Minerals (CENTRUM SILVER ADULT 50+ PO) Take  by mouth daily.       calcium-vitamin D (OSCAL 500/200 D-3) 500-200 MG-UNIT per tablet Take 1 tablet by mouth 2 times daily.      NONFORMULARY daily. Tunde Red       No current facility-administered medications for this visit. Patient Active Problem List   Diagnosis    Degenerative arthritis of hip R    Primary osteoarthritis of left knee    Essential hypertension    Familial hypercholesterolemia    Osteoporosis    Spinal stenosis of lumbar region    Lumbar radiculopathy    DDD (degenerative disc disease), lumbar    Synovial cyst of lumbar facet joint    Arthritis of right knee       Past Medical History:   Diagnosis Date    Arthritis     right hip    Cancer ([address]     breast 1997 approx. left    Hyperlipidemia     Hypertension     Osteoarthrosis     Osteoporosis     Other chronic postoperative pain     Undiagnosed cardiac murmurs        Past Surgical History:   Procedure Laterality Date    ANESTHESIA NERVE BLOCK Left 4/30/2019    LUMBAR EPIDURAL STERIOD INJECTION L4-5  (LEFT PARAMEDIAN APPROACH) performed by Janes Ortega MD at [address] [place] [address]      biopsied on left breast    COLONOSCOPY      EPIDURAL STEROID INJECTION Left 3/19/2019    LEFT LUMBAR TRANSFORAMINAL EPIDURAL STEROID INJECTION: L4 / L5 performed by Janes Ortega MD at [address]      right ankle    HYSTERECTOMY  1979    JOINT REPLACEMENT      L hip     JOINT REPLACEMENT  10/2013    R hip    JOINT REPLACEMENT  03/08/2016    L knee     JOINT REPLACEMENT  11/03/2020    R knee     NERVE BLOCK Left 03/19/2019    left lumbar transforaminal epidural    NERVE BLOCK Left 04/30/2019    lumbar    TONSILLECTOMY      TOTAL HIP ARTHROPLASTY Right Oct 2013    with cell saver    TOTAL KNEE ARTHROPLASTY Left 3/8/16    TOTAL KNEE ARTHROPLASTY Right 11/3/2020    TOTAL RIGHT KNEE REPLACEMENT/ ARTHROPLASTY (BERNABE) performed by Key Bennett DO at [address]       No Known Allergies    Social History     Socioeconomic History    Marital status:      Spouse name: None    Number of children: None    Years of education: None    Highest education level: None   Occupational History    None   Social Needs    Financial resource strain: None    Food insecurity     Worry: None     Inability: None    Transportation needs     Medical: None     Non-medical: None   Tobacco Use    Smoking status: Former Smoker     Packs/day: 0.25     Years: 3.00     Pack years: 0.75     Last attempt to quit: 9/10/1998     Years since quittin.2    Smokeless tobacco: Never Used    Tobacco comment: quit smoking approx 20 yrs ago   Substance and Sexual Activity    Alcohol use: Yes     Comment: occasionally    Drug use: No    Sexual activity: None   Lifestyle    Physical activity     Days per week: None     Minutes per session: None    Stress: None   Relationships    Social connections     Talks on phone: None     Gets together: None     Attends [employer] service: None     Active member of club or organization: None     Attends meetings of clubs or organizations: None     Relationship status: None    Intimate partner violence     Fear of current or ex partner: None     Emotionally abused: None     Physically abused: None     Forced sexual activity: None   Other Topics Concern    None   Social History Narrative    None       Review of Systems  As follows except as previously noted in HPI:  Constitutional: Negative for chills, diaphoresis, fatigue, fever and unexpected weight change. Respiratory: Negative for cough, shortness of breath and wheezing. Cardiovascular: Negative for chest pain and palpitations. Neurological: Negative for dizziness, syncope, cephalgia. GI / : negative  Musculoskeletal: see HPI       Objective:   Physical Exam   Constitutional: Oriented to person, place, and time. and appears well-developed and well-nourished. :   Head: Normocephalic and atraumatic. Eyes: EOM are normal.   Neck: Neck supple.    Cardiovascular: Normal rate and regular rhythm. Pulmonary/Chest: Effort normal. No stridor. No respiratory distress, no wheezes. Abdominal:  No abnormal distension. Neurological: Alert and oriented to person, place, and time. Skin: Skin is warm and dry. Psychiatric: Normal mood and affect.  Behavior is normal. Thought content normal.    CHI Encinas, DO    11/30/20  9:05 AM

## 2020-12-21 ENCOUNTER — OFFICE VISIT (OUTPATIENT)
Dept: ORTHOPEDIC SURGERY | Age: 80
End: 2020-12-21

## 2020-12-21 VITALS — BODY MASS INDEX: 27.13 KG/M2 | HEIGHT: 68 IN | WEIGHT: 179 LBS | TEMPERATURE: 98 F

## 2020-12-21 PROCEDURE — 99024 POSTOP FOLLOW-UP VISIT: CPT | Performed by: ORTHOPAEDIC SURGERY

## 2020-12-21 NOTE — PROGRESS NOTES
Chief Complaint:   Chief Complaint   Patient presents with    Knee Pain     Right TKA DOS 11/03/2020       Jace Gosselin is 7 weeks postop right total knee replacement arthroplasty. She is doing well overall. Her therapy is going well. She still has some swelling around the knee that she asked about. She is wearing short KASSI hose. Allergies; medications; past medical, surgical, family, and social history; and problem list have been reviewed today and updated as indicated in this encounter seen below. Exam: Gait is fairly smooth and balanced with a little bit of a forward stoop and short stride. She does not fully straighten the knee with ambulation. Passive range of motion of the knee is less than 5 degrees to 110 degrees of flexion. She has good collateral stability. She has good anterior posterior stability. Patellofemoral alignment is good. There is some edema around the knee but it is not hot or red nor suggestive of any complication. Her calf is supple with no tenderness. Radiographs: None    ASSESSMENT:    Jassi Wu was seen today for knee pain. Diagnoses and all orders for this visit:    S/P total knee replacement using cement, right        PLAN: Finish physical therapy can continue home exercise. We will follow-up in 8 weeks. She can continue ice if she likes for comfort purposes. No follow-ups on file. Current Outpatient Medications   Medication Sig Dispense Refill    amLODIPine (NORVASC) 5 MG tablet Take 1 tablet by mouth daily 90 tablet 1    atorvastatin (LIPITOR) 40 MG tablet Take 1 tablet by mouth daily Mon,wed,fri 90 tablet 1    vitamin D (CHOLECALCIFEROL) 1000 UNITS TABS tablet Take 2,000 Units by mouth daily      Red Yeast Rice 600 MG CAPS Take 2 capsules by mouth three times a week      Multiple Vitamins-Minerals (CENTRUM SILVER ADULT 50+ PO) Take  by mouth daily.  calcium-vitamin D (OSCAL 500/200 D-3) 500-200 MG-UNIT per tablet Take 1 tablet by mouth 2 times daily.  NONFORMULARY daily. Tunde Red      aspirin 325 MG EC tablet Take 1 tablet by mouth 2 times daily for 28 days 56 tablet 0     No current facility-administered medications for this visit. Patient Active Problem List   Diagnosis    Degenerative arthritis of hip R    Primary osteoarthritis of left knee    Essential hypertension    Familial hypercholesterolemia    Osteoporosis    Spinal stenosis of lumbar region    Lumbar radiculopathy    DDD (degenerative disc disease), lumbar    Synovial cyst of lumbar facet joint    Arthritis of right knee       Past Medical History:   Diagnosis Date    Arthritis     right hip    Cancer ([address]     breast 1997 approx. left    Hyperlipidemia     Hypertension     Osteoarthrosis     Osteoporosis     Other chronic postoperative pain     Undiagnosed cardiac murmurs        Past Surgical History:   Procedure Laterality Date    ANESTHESIA NERVE BLOCK Left 4/30/2019    LUMBAR EPIDURAL STERIOD INJECTION L4-5  (LEFT PARAMEDIAN APPROACH) performed by Joni Epley, MD at [address] [place] [address]      biopsied on left breast    COLONOSCOPY      EPIDURAL STEROID INJECTION Left 3/19/2019    LEFT LUMBAR TRANSFORAMINAL EPIDURAL STEROID INJECTION: L4 / L5 performed by Joni Epley, MD at [address]      right ankle    HYSTERECTOMY  1979    JOINT REPLACEMENT      L hip     JOINT REPLACEMENT  10/2013    R hip    JOINT REPLACEMENT  03/08/2016    L knee     JOINT REPLACEMENT  11/03/2020    R knee     NERVE BLOCK Left 03/19/2019    left lumbar transforaminal epidural    NERVE BLOCK Left 04/30/2019    lumbar    TONSILLECTOMY      TOTAL HIP ARTHROPLASTY Right Oct 2013    with cell saver    TOTAL KNEE ARTHROPLASTY Left 3/8/16  TOTAL KNEE ARTHROPLASTY Right 11/3/2020    TOTAL RIGHT KNEE REPLACEMENT/ ARTHROPLASTY (BERNABE) performed by Ana Puckett DO at [address]       No Known Allergies    Social History     Socioeconomic History    Marital status:      Spouse name: None    Number of children: None    Years of education: None    Highest education level: None   Occupational History    None   Social Needs    Financial resource strain: None    Food insecurity     Worry: None     Inability: None    Transportation needs     Medical: None     Non-medical: None   Tobacco Use    Smoking status: Former Smoker     Packs/day: 0.25     Years: 3.00     Pack years: 0.75     Quit date: 9/10/1998     Years since quittin.2    Smokeless tobacco: Never Used    Tobacco comment: quit smoking approx 20 yrs ago   Substance and Sexual Activity    Alcohol use: Yes     Comment: occasionally    Drug use: No    Sexual activity: None   Lifestyle    Physical activity     Days per week: None     Minutes per session: None    Stress: None   Relationships    Social connections     Talks on phone: None     Gets together: None     Attends Roman [employer] service: None     Active member of club or organization: None     Attends meetings of clubs or organizations: None     Relationship status: None    Intimate partner violence     Fear of current or ex partner: None     Emotionally abused: None     Physically abused: None     Forced sexual activity: None   Other Topics Concern    None   Social History Narrative    None       Review of Systems  As follows except as previously noted in HPI:  Constitutional: Negative for chills, diaphoresis, fatigue, fever and unexpected weight change. Respiratory: Negative for cough, shortness of breath and wheezing. Cardiovascular: Negative for chest pain and palpitations. Neurological: Negative for dizziness, syncope, cephalgia.   GI / : negative  Musculoskeletal: see HPI       Objective:

## 2021-01-11 ENCOUNTER — APPOINTMENT (OUTPATIENT)
Dept: GENERAL RADIOLOGY | Age: 81
End: 2021-01-11
Payer: MEDICARE

## 2021-01-11 ENCOUNTER — HOSPITAL ENCOUNTER (EMERGENCY)
Age: 81
Discharge: HOME | End: 2021-01-11
Attending: EMERGENCY MEDICINE
Payer: MEDICARE

## 2021-01-11 VITALS
BODY MASS INDEX: 26.79 KG/M2 | RESPIRATION RATE: 20 BRPM | SYSTOLIC BLOOD PRESSURE: 136 MMHG | DIASTOLIC BLOOD PRESSURE: 76 MMHG | TEMPERATURE: 98 F | OXYGEN SATURATION: 94 % | HEART RATE: 92 BPM | WEIGHT: 176.2 LBS

## 2021-01-11 DIAGNOSIS — S73.015A POSTERIOR DISLOCATION OF LEFT HIP, INITIAL ENCOUNTER (HCC): Primary | ICD-10-CM

## 2021-01-11 PROCEDURE — 73502 X-RAY EXAM HIP UNI 2-3 VIEWS: CPT

## 2021-01-11 PROCEDURE — 99285 EMERGENCY DEPT VISIT HI MDM: CPT

## 2021-01-11 PROCEDURE — 96375 TX/PRO/DX INJ NEW DRUG ADDON: CPT

## 2021-01-11 PROCEDURE — 6360000002 HC RX W HCPCS: Performed by: EMERGENCY MEDICINE

## 2021-01-11 PROCEDURE — 27265 TREAT HIP DISLOCATION: CPT

## 2021-01-11 PROCEDURE — 2500000003 HC RX 250 WO HCPCS: Performed by: EMERGENCY MEDICINE

## 2021-01-11 PROCEDURE — 96374 THER/PROPH/DIAG INJ IV PUSH: CPT

## 2021-01-11 RX ORDER — FENTANYL CITRATE 50 UG/ML
25 INJECTION, SOLUTION INTRAMUSCULAR; INTRAVENOUS ONCE
Status: COMPLETED | OUTPATIENT
Start: 2021-01-11 | End: 2021-01-11

## 2021-01-11 RX ORDER — ETOMIDATE 2 MG/ML
10 INJECTION INTRAVENOUS ONCE
Status: COMPLETED | OUTPATIENT
Start: 2021-01-11 | End: 2021-01-11

## 2021-01-11 RX ADMIN — ETOMIDATE 9.5 MG: 2 INJECTION INTRAVENOUS at 12:59

## 2021-01-11 RX ADMIN — FENTANYL CITRATE 25 MCG: 50 INJECTION INTRAMUSCULAR; INTRAVENOUS at 11:21

## 2021-01-11 ASSESSMENT — ENCOUNTER SYMPTOMS
DIARRHEA: 0
NAUSEA: 0
SORE THROAT: 0
COUGH: 0
ABDOMINAL PAIN: 0
SHORTNESS OF BREATH: 0
PHOTOPHOBIA: 0
CONSTIPATION: 0
SINUS PRESSURE: 0
VOMITING: 0
SINUS PAIN: 0
RHINORRHEA: 0
WHEEZING: 0

## 2021-01-11 ASSESSMENT — PAIN DESCRIPTION - ORIENTATION: ORIENTATION: LEFT

## 2021-01-11 ASSESSMENT — PAIN SCALES - GENERAL: PAINLEVEL_OUTOF10: 10

## 2021-01-11 ASSESSMENT — PAIN DESCRIPTION - PAIN TYPE: TYPE: ACUTE PAIN

## 2021-01-11 ASSESSMENT — PAIN DESCRIPTION - ONSET: ONSET: ON-GOING

## 2021-01-11 NOTE — ED NOTES
Bed: 19  Expected date:   Expected time:   Means of arrival:   Comments:     Lenard Tariq, SAV  01/11/21 1016

## 2021-01-11 NOTE — CONSULTS
Department of Orthopedic Surgery  Resident Consult Note          Reason for Consult: Left Hip Pain    HISTORY OF PRESENT ILLNESS:       Patient is a 79 y.o. female who presents with hip pain. The patient states that she was bending over to put her socks on this morning when she had sudden onset of pain in her left groin. She denies any falls or trauma. The patient has a history of left ROLF 15 years ago with Dr. Palak Quinn. She denies any previous hip dislocations. She has also had a right ROLF 10 years ago L TKA done several years ago and R TKA done on 11/3/2020. Anticoagulation - ASA. The patient is community Ambulator without assist. Pt lives at home. Patient last ate earlier this morning. Previous Orthopedic Surgeon - yes - Dr. Palak Quinn. Previous Hip dislocations - No.  Denies numbness/tingling/paresthesias. Denies any other orthopedic complaints at this time. Past Medical History:        Diagnosis Date    Arthritis     right hip    Cancer ([address]     breast 1997 approx. left    Hyperlipidemia     Hypertension     Osteoarthrosis     Osteoporosis     Other chronic postoperative pain     Undiagnosed cardiac murmurs      Past Surgical History:        Procedure Laterality Date    ANESTHESIA NERVE BLOCK Left 4/30/2019    LUMBAR EPIDURAL STERIOD INJECTION L4-5  (LEFT PARAMEDIAN APPROACH) performed by Pramod Trevino MD at [facility]      biopsied on left breast    COLONOSCOPY      EPIDURAL STEROID INJECTION Left 3/19/2019    LEFT LUMBAR TRANSFORAMINAL EPIDURAL STEROID INJECTION: L4 / L5 performed by Pramod Trevino MD at [address]      cataracts OU    FRACTURE SURGERY      right ankle    HYSTERECTOMY  1979    JOINT REPLACEMENT      L hip     JOINT REPLACEMENT  10/2013    R hip    JOINT REPLACEMENT  03/08/2016    L knee     JOINT REPLACEMENT  11/03/2020    R knee     NERVE BLOCK Left 03/19/2019 Sensation grossly intact superficial/deep peroneal,saphenous,sural,tibial n. distributions  · + 4/5 GS/TA/EHL. Secondary Exam:   bilateralUE: No obvious signs of trauma. -TTP to fingers, hand, wrist, forearm, elbow, humerus, shoulder or clavicle. · rightLE: No obvious signs of trauma. -TTP to foot, ankle, leg, knee, thigh, hip. · Pelvis: -TTP, -Log roll, -Heel strike     DATA:    CBC:   Lab Results   Component Value Date    WBC 6.9 10/22/2020    RBC 4.83 10/22/2020    HGB 12.8 11/04/2020    HCT 38.2 11/04/2020    MCV 89.9 10/22/2020    MCH 29.2 10/22/2020    MCHC 32.5 10/22/2020    RDW 14.1 10/22/2020     10/22/2020    MPV 10.8 10/22/2020     PT/INR:    Lab Results   Component Value Date    PROTIME 11.3 10/26/2020    INR 1.0 10/26/2020     Lactic Acid : No results found for: [address]    Radiology Review:  01/11/21 - XR of the pelvis and left hip demonstrates bilateral total hip arthroplasties. There is a posterior superior dislocation of the left hip. The cup and femoral component appear to be well fixed and no fractures are noted. Post reduction xrays demonstrate that the left hip is relocated on both the AP and lateral images and again shows no fractures. IMPRESSION:   · Closed, Left total hip arthroplasty Hip Dislocation    PLAN:  After informed verbal consent the Patient underwent conscious sedation per ED. The patient was Closed reduced. The patient remained neurovascularly intact both before and after the procedure. Weight bearing as tolerated. Hip precautions were discussed at length. Instructed to keep knee immobilizer in place until follow up in office.   Well padded Knee Immobilizer  Can follow up in office with Dr. Octavio Healy in 1 week, Call to schedule appointment  Discussed with Dr. Octavio Healy

## 2021-01-11 NOTE — ED PROVIDER NOTES
Patient is an 41-year-old female who presents via EMS from home with a chief complaint of left hip pain. Patient states that she was bending over putting on a sock when she felt sudden onset of left hip pain and was unable to ambulate. Patient that she can stand but not put much weight or ambulate on her left lower extremity due to pain. The pain is better with rest.  Patient denies any recent trauma or falls. She did have a left hip replacement done several years ago by Dr. Ron Kwok. She denies any numbness or tingling of the extremity. No treatment prior to arrival.  Patient denies any lightheadedness, dizziness, chest pain, shortness of breath, abdominal pain, nausea, vomiting. The history is provided by the patient. No  was used. Review of Systems   Constitutional: Negative for chills, fatigue and fever. HENT: Negative for congestion, rhinorrhea, sinus pressure, sinus pain, sneezing and sore throat. Eyes: Negative for photophobia and visual disturbance. Respiratory: Negative for cough, shortness of breath and wheezing. Cardiovascular: Negative for chest pain and palpitations. Gastrointestinal: Negative for abdominal pain, constipation, diarrhea, nausea and vomiting. Genitourinary: Negative for dysuria, frequency and hematuria. Musculoskeletal: Positive for arthralgias (left hip pain). Negative for neck pain and neck stiffness. Skin: Negative for rash and wound. Neurological: Negative for dizziness, light-headedness and headaches. Psychiatric/Behavioral: Negative for agitation, behavioral problems and confusion. Physical Exam  Constitutional:       General: She is not in acute distress. Appearance: She is not toxic-appearing. HENT:      Head: Normocephalic. Mouth/Throat:      Mouth: Mucous membranes are moist.   Eyes:      General: No scleral icterus. Pupils: Pupils are equal, round, and reactive to light.    Neck: Musculoskeletal: Normal range of motion. No neck rigidity. Cardiovascular:      Rate and Rhythm: Normal rate. Heart sounds: No murmur. No gallop. Pulmonary:      Effort: No respiratory distress. Breath sounds: Normal breath sounds. No wheezing. Abdominal:      General: There is no distension. Palpations: Abdomen is soft. Tenderness: There is no abdominal tenderness. Musculoskeletal:         General: Tenderness present. No swelling or deformity. Comments: Patient does have tenderness over the greater trochanter of the left hip. Pain with logroll of the left lower extremity. The left lower extremity is shortened and internally rotated. No sensory deficit. DP and PT +2. Lymphadenopathy:      Cervical: No cervical adenopathy. Skin:     General: Skin is warm. Capillary Refill: Capillary refill takes less than 2 seconds. Coloration: Skin is not jaundiced. Findings: No bruising. Neurological:      Mental Status: She is alert and oriented to person, place, and time. Cranial Nerves: No cranial nerve deficit. Motor: No weakness. Comments: Patient is awake, alert, oriented x3. No focal neurological deficit. Psychiatric:         Mood and Affect: Mood normal.         Thought Content: Thought content normal.          Procedures   -------------------------------- Conscious Sedation Procedure Note -----------------------------  Patient Name: Rain Alvarenga   Medical Record Number: [id number]  Date: 1/11/21   Time: 2:12 PM EST   Room/Bed: 04/04    Indication: hip dislocation  Consent: I have discussed with the patient and/or the patient representative the indication, alternatives, and the possible risks and/or complications of the planned procedure and the anesthesia methods. The patient and/or patient representative appear to understand and agree to proceed. Physician Involvement: The attending physician was present and supervising this procedure. 1/11/21     Time: 1255 (pre-procedure start time)  Pre-Sedation Documentation and Exam: I have personally completed a history, physical exam & review of systems for this patient (see notes). Airway Assessment: normal  Prior History of Anesthesia Complications: none  ASA Classification: Class 2 - A normal healthy patient with mild systemic disease  Sedation/ Anesthesia Plan: intravenous sedation  Medications Used: etomidate intravenously  Monitoring and Safety: The patient was placed on a cardiac monitor and vital signs, pulse oximetry and level of consciousness were continuously evaluated throughout the procedure.  The patient was closely monitored until recovery from the medications was complete and the patient had returned to baseline status.     ----------------------------------- Post Conscious Sedation Note -----------------------------------  (The following Post Sedation note must be completed)  1/11/21     Time: 0115 (as per nursing note stop time)  Post-Sedation Vital Signs: Vital signs were reviewed and were stable after the procedure (see flow sheet for vitals)       Post-Sedation Exam: Lungs: clear to auscultation bilaterally without crackles or wheezing and Cardiovascular: regular rate and rhythm, no murmurs rubs or gallops       Complications: none    Electronically Signed by: DO VALENTIN Drew  Number of Diagnoses or Management Options  Posterior dislocation of left hip, initial encounter ([address] Diagnosis management comments: Patient is an 79-year-old female who presented with a chief complaint of sudden onset of left hip pain after bending over to put a sock on. Patient had a hip replacement done several years ago and on imaging today it did show a superior posterior hip dislocation of the left side. Patient had no focal neurological deficits. I did have orthopedic surgery residents come down to assist in the reduction. Etomidate was given and the reduction was successful after 1 attempt. Patient tolerated procedure well and is now alert and oriented. She does follow with Dr. Sammy Mcleod she will contact his office for close follow-up appointment. The patient does have a walker at home to assist in ambulation she does have a knee mobilizer on. Patient understands has no further questions, she will return if she is any worsening symptoms. No further questions. ED Course as of Jan 11 1447 Mon Jan 11, 2021   1215 I did speak with orthopedic surgery resident, he will be coming to the emergency department to assist with reduction. [MS]   7173 Patient states that she has never had any issues with anesthesia in the past.  She has no dentures. The patient is agreeable for closed hip reduction. Orthopedic surgery is in the emergency department and will assist with the reduction. [MS]   1308 Successful reduction of left hip dislocation by orthopedic surgery residents. Resident physician and myself did the sedation. Patient tolerated it well. [MS]   [id number] 90 Patient is resting comfortably in bed. She is awake, alert, oriented x3. The patient will follow up with her orthopedic surgeon the next couple of days. In the meantime she does have a walker at home to assist with ambulation. Patient understands has no further questions.     [MS]      ED Course User Index  [MS] Essie Logan, DO      ED Course as of Jan 11 1447   Mon Jan 11, 2021 26 I did speak with orthopedic surgery resident, he will be coming to the emergency department to assist with reduction. [MS]   6391 Patient states that she has never had any issues with anesthesia in the past.  She has no dentures. The patient is agreeable for closed hip reduction. Orthopedic surgery is in the emergency department and will assist with the reduction. [MS]   1308 Successful reduction of left hip dislocation by orthopedic surgery residents. Resident physician and myself did the sedation. Patient tolerated it well. [MS]   [id number] 14 90 Patient is resting comfortably in bed. She is awake, alert, oriented x3. The patient will follow up with her orthopedic surgeon the next couple of days. In the meantime she does have a walker at home to assist with ambulation. Patient understands has no further questions. [MS]      ED Course User Index  [MS] Avtar Mullen, DO       --------------------------------------------- PAST HISTORY ---------------------------------------------  Past Medical History:  has a past medical history of Arthritis, Cancer ([facility] Hyperlipidemia, Hypertension, Osteoarthrosis, Osteoporosis, Other chronic postoperative pain, and Undiagnosed cardiac murmurs. Past Surgical History:  has a past surgical history that includes fracture surgery; Breast surgery; Tonsillectomy; Appendectomy; Colonoscopy; eye surgery; Total hip arthroplasty (Right, Oct 2013); Total knee arthroplasty (Left, 3/8/16); Hysterectomy (1979); Nerve Block (Left, 03/19/2019); epidural steroid injection (Left, 3/19/2019); Nerve Block (Left, 04/30/2019); Anesthesia Nerve Block (Left, 4/30/2019); joint replacement; joint replacement (10/2013); joint replacement (03/08/2016); joint replacement (11/03/2020); and Total knee arthroplasty (Right, 11/3/2020). At this time the patient is without objective evidence of an acute process requiring hospitalization or inpatient management. They have remained hemodynamically stable throughout their entire ED visit and are stable for discharge with outpatient follow-up. The plan has been discussed in detail and they are aware of the specific conditions for emergent return, as well as the importance of follow-up. New Prescriptions    No medications on file       Diagnosis:  1. Posterior dislocation of left hip, initial encounter [facility])        Disposition:  Patient's disposition: Discharge to home  Patient's condition is stable.               Ninfa Yun DO  01/11/21 1502

## 2021-01-15 ENCOUNTER — OFFICE VISIT (OUTPATIENT)
Dept: ORTHOPEDIC SURGERY | Age: 81
End: 2021-01-15
Payer: MEDICARE

## 2021-01-15 VITALS — TEMPERATURE: 98 F | BODY MASS INDEX: 25.76 KG/M2 | HEIGHT: 68 IN | WEIGHT: 170 LBS

## 2021-01-15 DIAGNOSIS — Z98.890 S/P CLOSED REDUCTION OF DISLOCATED TOTAL HIP PROSTHESIS: Primary | ICD-10-CM

## 2021-01-15 PROCEDURE — G8399 PT W/DXA RESULTS DOCUMENT: HCPCS | Performed by: ORTHOPAEDIC SURGERY

## 2021-01-15 PROCEDURE — 99213 OFFICE O/P EST LOW 20 MIN: CPT | Performed by: ORTHOPAEDIC SURGERY

## 2021-01-15 PROCEDURE — 1036F TOBACCO NON-USER: CPT | Performed by: ORTHOPAEDIC SURGERY

## 2021-01-15 PROCEDURE — 4040F PNEUMOC VAC/ADMIN/RCVD: CPT | Performed by: ORTHOPAEDIC SURGERY

## 2021-01-15 PROCEDURE — 1090F PRES/ABSN URINE INCON ASSESS: CPT | Performed by: ORTHOPAEDIC SURGERY

## 2021-01-15 PROCEDURE — 1123F ACP DISCUSS/DSCN MKR DOCD: CPT | Performed by: ORTHOPAEDIC SURGERY

## 2021-01-15 PROCEDURE — G8482 FLU IMMUNIZE ORDER/ADMIN: HCPCS | Performed by: ORTHOPAEDIC SURGERY

## 2021-01-15 PROCEDURE — G8427 DOCREV CUR MEDS BY ELIG CLIN: HCPCS | Performed by: ORTHOPAEDIC SURGERY

## 2021-01-15 PROCEDURE — G8417 CALC BMI ABV UP PARAM F/U: HCPCS | Performed by: ORTHOPAEDIC SURGERY

## 2021-01-15 NOTE — PROGRESS NOTES
Medication Sig Dispense Refill    amLODIPine (NORVASC) 5 MG tablet Take 1 tablet by mouth daily 90 tablet 1    atorvastatin (LIPITOR) 40 MG tablet Take 1 tablet by mouth daily Mon,wed,fri 90 tablet 1    vitamin D (CHOLECALCIFEROL) 1000 UNITS TABS tablet Take 2,000 Units by mouth daily      Red Yeast Rice 600 MG CAPS Take 2 capsules by mouth three times a week      Multiple Vitamins-Minerals (CENTRUM SILVER ADULT 50+ PO) Take  by mouth daily.  calcium-vitamin D (OSCAL 500/200 D-3) 500-200 MG-UNIT per tablet Take 1 tablet by mouth 2 times daily.  NONFORMULARY daily. Tunde Red      aspirin 325 MG EC tablet Take 1 tablet by mouth 2 times daily for 28 days 56 tablet 0     No current facility-administered medications for this visit. Patient Active Problem List   Diagnosis    Degenerative arthritis of hip R    Primary osteoarthritis of left knee    Essential hypertension    Familial hypercholesterolemia    Osteoporosis    Spinal stenosis of lumbar region    Lumbar radiculopathy    DDD (degenerative disc disease), lumbar    Synovial cyst of lumbar facet joint    Arthritis of right knee       Past Medical History:   Diagnosis Date    Arthritis     right hip    Cancer ([address]     breast 1997 approx. left    Hyperlipidemia     Hypertension     Osteoarthrosis     Osteoporosis     Other chronic postoperative pain     Undiagnosed cardiac murmurs        Past Surgical History:   Procedure Laterality Date    ANESTHESIA NERVE BLOCK Left 4/30/2019    LUMBAR EPIDURAL STERIOD INJECTION L4-5  (LEFT PARAMEDIAN APPROACH) performed by Aislinn Jackson MD at [address] [facility]      biopsied on left breast    COLONOSCOPY      EPIDURAL STEROID INJECTION Left 3/19/2019    LEFT LUMBAR TRANSFORAMINAL EPIDURAL STEROID INJECTION: L4 / L5 performed by Aislinn Jackson MD at [address] [facility]      cataracts OU    FRACTURE SURGERY      right ankle  HYSTERECTOMY  1979    JOINT REPLACEMENT      L hip     JOINT REPLACEMENT  10/2013    R hip    JOINT REPLACEMENT  2016    L knee     JOINT REPLACEMENT  2020    R knee     NERVE BLOCK Left 2019    left lumbar transforaminal epidural    NERVE BLOCK Left 2019    lumbar    TONSILLECTOMY      TOTAL HIP ARTHROPLASTY Right Oct 2013    with cell saver    TOTAL KNEE ARTHROPLASTY Left 3/8/16    TOTAL KNEE ARTHROPLASTY Right 11/3/2020    TOTAL RIGHT KNEE REPLACEMENT/ ARTHROPLASTY ([facility]) performed by Jorge Luis Ware DO at [address]       No Known Allergies    Social History     Socioeconomic History    Marital status:       Spouse name: None    Number of children: None    Years of education: None    Highest education level: None   Occupational History    None   Social Needs    Financial resource strain: None    Food insecurity     Worry: None     Inability: None    Transportation needs     Medical: None     Non-medical: None   Tobacco Use    Smoking status: Former Smoker     Packs/day: 0.25     Years: 3.00     Pack years: 0.75     Quit date: 9/10/1998     Years since quittin.3    Smokeless tobacco: Never Used    Tobacco comment: quit smoking approx 20 yrs ago   Substance and Sexual Activity    Alcohol use: Yes     Comment: occasionally    Drug use: No    Sexual activity: None   Lifestyle    Physical activity     Days per week: None     Minutes per session: None    Stress: None   Relationships    Social connections     Talks on phone: None     Gets together: None     Attends Christianity service: None     Active member of club or organization: None     Attends meetings of clubs or organizations: None     Relationship status: None    Intimate partner violence     Fear of current or ex partner: None     Emotionally abused: None     Physically abused: None     Forced sexual activity: None   Other Topics Concern    None   Social History Narrative    None Review of Systems  As follows except as previously noted in HPI:  Constitutional: Negative for chills, diaphoresis, fatigue, fever and unexpected weight change. Respiratory: Negative for cough, shortness of breath and wheezing. Cardiovascular: Negative for chest pain and palpitations. Neurological: Negative for dizziness, syncope, cephalgia. GI / : negative  Musculoskeletal: see HPI       Objective:   Physical Exam   Constitutional: Oriented to person, place, and time. and appears well-developed and well-nourished. :   Head: Normocephalic and atraumatic. Eyes: EOM are normal.   Neck: Neck supple. Cardiovascular: Normal rate and regular rhythm. Pulmonary/Chest: Effort normal. No stridor. No respiratory distress, no wheezes. Abdominal:  No abnormal distension. Neurological: Alert and oriented to person, place, and time. Skin: Skin is warm and dry. Psychiatric: Normal mood and affect.  Behavior is normal. Thought content normal.    CHI Myers DO    1/15/21  9:14 AM

## 2021-02-05 ENCOUNTER — OFFICE VISIT (OUTPATIENT)
Dept: ORTHOPEDIC SURGERY | Age: 81
End: 2021-02-05
Payer: MEDICARE

## 2021-02-05 VITALS — TEMPERATURE: 98 F | HEIGHT: 68 IN | BODY MASS INDEX: 25.76 KG/M2 | WEIGHT: 170 LBS

## 2021-02-05 DIAGNOSIS — Z98.890 S/P CLOSED REDUCTION OF DISLOCATED TOTAL HIP PROSTHESIS: Primary | ICD-10-CM

## 2021-02-05 PROCEDURE — G8399 PT W/DXA RESULTS DOCUMENT: HCPCS | Performed by: ORTHOPAEDIC SURGERY

## 2021-02-05 PROCEDURE — G8427 DOCREV CUR MEDS BY ELIG CLIN: HCPCS | Performed by: ORTHOPAEDIC SURGERY

## 2021-02-05 PROCEDURE — G8417 CALC BMI ABV UP PARAM F/U: HCPCS | Performed by: ORTHOPAEDIC SURGERY

## 2021-02-05 PROCEDURE — 4040F PNEUMOC VAC/ADMIN/RCVD: CPT | Performed by: ORTHOPAEDIC SURGERY

## 2021-02-05 PROCEDURE — 1090F PRES/ABSN URINE INCON ASSESS: CPT | Performed by: ORTHOPAEDIC SURGERY

## 2021-02-05 PROCEDURE — 1123F ACP DISCUSS/DSCN MKR DOCD: CPT | Performed by: ORTHOPAEDIC SURGERY

## 2021-02-05 PROCEDURE — 99214 OFFICE O/P EST MOD 30 MIN: CPT | Performed by: ORTHOPAEDIC SURGERY

## 2021-02-05 PROCEDURE — 1036F TOBACCO NON-USER: CPT | Performed by: ORTHOPAEDIC SURGERY

## 2021-02-05 PROCEDURE — G8482 FLU IMMUNIZE ORDER/ADMIN: HCPCS | Performed by: ORTHOPAEDIC SURGERY

## 2021-02-05 NOTE — PROGRESS NOTES
Chief Complaint:   Chief Complaint   Patient presents with    Hip Pain     Left ROLF dislocation F/U DOI 1/11/2021       Bertin Thomson is 25 days post dislocation of her left total hip prosthesis. She is complained of a little bit of discomfort and also some difficulty with urinating which actually started a week before the dislocation occurred. She is very annoyed with the knee immobilizer and wants it off there. Is obstacle for her and she can put on by her self. A friend of hers puts it on. She is walking with a walker. She is also asking about follow-up for her knee which was scheduled for the 15th. Allergies; medications; past medical, surgical, family, and social history; and problem list have been reviewed today and updated as indicated in this encounter seen below. The patient called her son Jorge Washington who is a physician in [facility] who wish to discuss her hip and any related issues. We had a long conversation discussing the injury and also the initial procedure, the ability of the components with regards to recall of which there has been none. We also discussed that it was most likely the perfect storm positioning the caused her to dislocate. We have no plans for operative treatment. We will remove the knee immobilizer at her insistence. We also discussed her history of some urinary difficulties and made it clear that she should contact Dr. Thuy Hernandes straightaway for evaluation or referral as he feels appropriate. Patient indicated that she would do this. Exam: There is no tenderness palpation around the left hip area. Within a limited range of motion there is no pain. The immobilizer is on and annoys her quite a lot. The right knee incisions well-healed. Range of motion the knee is 0 to 110 degrees of flexion. She has good collateral stability. She has good functional anterior and posterior stability and patellofemoral alignment is good. Her calf is supple with no tenderness. Radiographs: Images of the left hip dislocated and postreduction were reviewed once again. There is little obvious wear with the head centered well in the acetabulum. There is no suggestion of instability of the acetabular shell or femoral stem and head. ASSESSMENT:    Caleb Belcher was seen today for hip pain. Diagnoses and all orders for this visit:    S/P closed reduction of dislocated total hip prosthesis        PLAN: Discontinue the immobilizer with strict instructions to avoid excessive flexion or any internal rotation of the left hip. Continue with walker ambulation for safety purposes. We will follow-up in 3 weeks. Return in about 3 weeks (around 2/26/2021). Current Outpatient Medications   Medication Sig Dispense Refill    amLODIPine (NORVASC) 5 MG tablet Take 1 tablet by mouth daily 90 tablet 1    atorvastatin (LIPITOR) 40 MG tablet Take 1 tablet by mouth daily Mon,wed,fri 90 tablet 1    vitamin D (CHOLECALCIFEROL) 1000 UNITS TABS tablet Take 2,000 Units by mouth daily      Red Yeast Rice 600 MG CAPS Take 2 capsules by mouth three times a week      Multiple Vitamins-Minerals (CENTRUM SILVER ADULT 50+ PO) Take  by mouth daily.  calcium-vitamin D (OSCAL 500/200 D-3) 500-200 MG-UNIT per tablet Take 1 tablet by mouth 2 times daily.  NONFORMULARY daily. Tunde Red      aspirin 325 MG EC tablet Take 1 tablet by mouth 2 times daily for 28 days 56 tablet 0     No current facility-administered medications for this visit.         Patient Active Problem List   Diagnosis    Degenerative arthritis of hip R    Primary osteoarthritis of left knee    Essential hypertension    Familial hypercholesterolemia    Osteoporosis Non-medical: None   Tobacco Use    Smoking status: Former Smoker     Packs/day: 0.25     Years: 3.00     Pack years: 0.75     Quit date: 9/10/1998     Years since quittin.4    Smokeless tobacco: Never Used    Tobacco comment: quit smoking approx 20 yrs ago   Substance and Sexual Activity    Alcohol use: Yes     Comment: occasionally    Drug use: No    Sexual activity: None   Lifestyle    Physical activity     Days per week: None     Minutes per session: None    Stress: None   Relationships    Social connections     Talks on phone: None     Gets together: None     Attends Lutheran service: None     Active member of club or organization: None     Attends meetings of clubs or organizations: None     Relationship status: None    Intimate partner violence     Fear of current or ex partner: None     Emotionally abused: None     Physically abused: None     Forced sexual activity: None   Other Topics Concern    None   Social History Narrative    None       Review of Systems  As follows except as previously noted in HPI:  Constitutional: Negative for chills, diaphoresis, fatigue, fever and unexpected weight change. Respiratory: Negative for cough, shortness of breath and wheezing. Cardiovascular: Negative for chest pain and palpitations. Neurological: Negative for dizziness, syncope, cephalgia. GI / : negative  Musculoskeletal: see HPI       Objective:   Physical Exam   Constitutional: Oriented to person, place, and time. and appears well-developed and well-nourished. :   Head: Normocephalic and atraumatic. Eyes: EOM are normal.   Neck: Neck supple. Cardiovascular: Normal rate and regular rhythm. Pulmonary/Chest: Effort normal. No stridor. No respiratory distress, no wheezes. Abdominal:  No abnormal distension. Neurological: Alert and oriented to person, place, and time. Skin: Skin is warm and dry. Psychiatric: Normal mood and affect.  Behavior is normal. Thought content normal. Gavin Stafford DO    2/5/21  9:13 AM

## 2021-02-26 ENCOUNTER — OFFICE VISIT (OUTPATIENT)
Dept: ORTHOPEDIC SURGERY | Age: 81
End: 2021-02-26

## 2021-02-26 VITALS — BODY MASS INDEX: 25.76 KG/M2 | HEIGHT: 68 IN | TEMPERATURE: 98.4 F | WEIGHT: 170 LBS

## 2021-02-26 DIAGNOSIS — Z98.890 S/P CLOSED REDUCTION OF DISLOCATED TOTAL HIP PROSTHESIS: Primary | ICD-10-CM

## 2021-02-26 PROCEDURE — 99024 POSTOP FOLLOW-UP VISIT: CPT | Performed by: ORTHOPAEDIC SURGERY

## 2021-02-26 NOTE — PROGRESS NOTES
Chief Complaint:   Chief Complaint   Patient presents with    Hip Pain     Follow up left ROLF dislocation 01/11/21. Patient states she is doing well but does have pain from time to time. Halima Mcclendon 6 and half weeks post left total hip dislocation and reduction. She is doing pretty well. She has occasional discomfort around the hip area but is walking well and being careful. Allergies; medications; past medical, surgical, family, and social history; and problem list have been reviewed today and updated as indicated in this encounter seen below. Exam: There is no tenderness palpation around the left hip area. Range of motion is pretty good with no apprehension and no feeling of subluxation. There is no crepitus. Her gait is smooth and balanced at a moderate pace. Radiographs: None    ASSESSMENT:    Milana Flores was seen today for hip pain. Diagnoses and all orders for this visit:    S/P closed reduction of dislocated total hip prosthesis        PLAN: Continue with activity restrictions. We will follow-up in    Return in about 4 weeks (around 3/26/2021). Current Outpatient Medications   Medication Sig Dispense Refill    aspirin 325 MG EC tablet Take 1 tablet by mouth 2 times daily for 28 days 56 tablet 0    amLODIPine (NORVASC) 5 MG tablet Take 1 tablet by mouth daily 90 tablet 1    atorvastatin (LIPITOR) 40 MG tablet Take 1 tablet by mouth daily Mon,wed,fri 90 tablet 1    vitamin D (CHOLECALCIFEROL) 1000 UNITS TABS tablet Take 2,000 Units by mouth daily      Red Yeast Rice 600 MG CAPS Take 2 capsules by mouth three times a week      Multiple Vitamins-Minerals (CENTRUM SILVER ADULT 50+ PO) Take  by mouth daily.  calcium-vitamin D (OSCAL 500/200 D-3) 500-200 MG-UNIT per tablet Take 1 tablet by mouth 2 times daily.  NONFORMULARY daily. Tunde Red       No current facility-administered medications for this visit.         Patient Active Problem List   Diagnosis  Degenerative arthritis of hip R    Primary osteoarthritis of left knee    Essential hypertension    Familial hypercholesterolemia    Osteoporosis    Spinal stenosis of lumbar region    Lumbar radiculopathy    DDD (degenerative disc disease), lumbar    Synovial cyst of lumbar facet joint    Arthritis of right knee       Past Medical History:   Diagnosis Date    Arthritis     right hip    Cancer ([address]     breast 1997 approx. left    Hyperlipidemia     Hypertension     Osteoarthrosis     Osteoporosis     Other chronic postoperative pain     Undiagnosed cardiac murmurs        Past Surgical History:   Procedure Laterality Date    ANESTHESIA NERVE BLOCK Left 4/30/2019    LUMBAR EPIDURAL STERIOD INJECTION L4-5  (LEFT PARAMEDIAN APPROACH) performed by Lawson Fortune MD at [address] [place] [address]      biopsied on left breast    COLONOSCOPY      EPIDURAL STEROID INJECTION Left 3/19/2019    LEFT LUMBAR TRANSFORAMINAL EPIDURAL STEROID INJECTION: L4 / L5 performed by Lawson Fortune MD at [address]      right ankle    HYSTERECTOMY  1979    JOINT REPLACEMENT      L hip     JOINT REPLACEMENT  10/2013    R hip    JOINT REPLACEMENT  03/08/2016    L knee     JOINT REPLACEMENT  11/03/2020    R knee     NERVE BLOCK Left 03/19/2019    left lumbar transforaminal epidural    NERVE BLOCK Left 04/30/2019    lumbar    TONSILLECTOMY      TOTAL HIP ARTHROPLASTY Right Oct 2013    with cell saver    TOTAL KNEE ARTHROPLASTY Left 3/8/16    TOTAL KNEE ARTHROPLASTY Right 11/3/2020    TOTAL RIGHT KNEE REPLACEMENT/ ARTHROPLASTY (BERNABE) performed by Tere Mcintosh DO at [address]       No Known Allergies    Social History     Socioeconomic History    Marital status:       Spouse name: None    Number of children: None    Years of education: None    Highest education level: None   Occupational History  None   Social Needs    Financial resource strain: None    Food insecurity     Worry: None     Inability: None    Transportation needs     Medical: None     Non-medical: None   Tobacco Use    Smoking status: Former Smoker     Packs/day: 0.25     Years: 3.00     Pack years: 0.75     Quit date: 9/10/1998     Years since quittin.4    Smokeless tobacco: Never Used    Tobacco comment: quit smoking approx 20 yrs ago   Substance and Sexual Activity    Alcohol use: Yes     Comment: occasionally    Drug use: No    Sexual activity: None   Lifestyle    Physical activity     Days per week: None     Minutes per session: None    Stress: None   Relationships    Social connections     Talks on phone: None     Gets together: None     Attends Islam service: None     Active member of club or organization: None     Attends meetings of clubs or organizations: None     Relationship status: None    Intimate partner violence     Fear of current or ex partner: None     Emotionally abused: None     Physically abused: None     Forced sexual activity: None   Other Topics Concern    None   Social History Narrative    None       Review of Systems  As follows except as previously noted in HPI:  Constitutional: Negative for chills, diaphoresis, fatigue, fever and unexpected weight change. Respiratory: Negative for cough, shortness of breath and wheezing. Cardiovascular: Negative for chest pain and palpitations. Neurological: Negative for dizziness, syncope, cephalgia. GI / : negative  Musculoskeletal: see HPI       Objective:   Physical Exam   Constitutional: Oriented to person, place, and time. and appears well-developed and well-nourished. :   Head: Normocephalic and atraumatic. Eyes: EOM are normal.   Neck: Neck supple. Cardiovascular: Normal rate and regular rhythm. Pulmonary/Chest: Effort normal. No stridor. No respiratory distress, no wheezes. Abdominal:  No abnormal distension. Neurological: Alert and oriented to person, place, and time. Skin: Skin is warm and dry. Psychiatric: Normal mood and affect.  Behavior is normal. Thought content normal.    CHI Freitas DO    2/26/21  9:15 AM

## 2021-03-08 ENCOUNTER — HOSPITAL ENCOUNTER (OUTPATIENT)
Age: 81
Discharge: HOME | End: 2021-03-08
Payer: MEDICARE

## 2021-03-08 DIAGNOSIS — E78.01 FAMILIAL HYPERCHOLESTEROLEMIA: ICD-10-CM

## 2021-03-08 DIAGNOSIS — R73.9 HYPERGLYCEMIA: ICD-10-CM

## 2021-03-08 LAB
ALBUMIN SERPL-MCNC: 4.6 G/DL (ref 3.5–5.2)
ALP BLD-CCNC: 50 U/L (ref 35–104)
ALT SERPL-CCNC: 17 U/L (ref 0–32)
ANION GAP SERPL CALCULATED.3IONS-SCNC: 8 MMOL/L (ref 7–16)
AST SERPL-CCNC: 17 U/L (ref 0–31)
BILIRUB SERPL-MCNC: 0.6 MG/DL (ref 0–1.2)
BUN BLDV-MCNC: 23 MG/DL (ref 8–23)
CALCIUM SERPL-MCNC: 9.7 MG/DL (ref 8.6–10.2)
CHLORIDE BLD-SCNC: 101 MMOL/L (ref 98–107)
CHOLESTEROL, TOTAL: 156 MG/DL (ref 0–199)
CO2: 30 MMOL/L (ref 22–29)
CREAT SERPL-MCNC: 0.8 MG/DL (ref 0.5–1)
GFR AFRICAN AMERICAN: >60
GFR NON-AFRICAN AMERICAN: >60 ML/MIN/1.73
GLUCOSE BLD-MCNC: 99 MG/DL (ref 74–99)
HBA1C MFR BLD: 5.9 % (ref 4–5.6)
HDLC SERPL-MCNC: 61 MG/DL
LDL CHOLESTEROL CALCULATED: 65 MG/DL (ref 0–99)
POTASSIUM SERPL-SCNC: 4.4 MMOL/L (ref 3.5–5)
SODIUM BLD-SCNC: 139 MMOL/L (ref 132–146)
TOTAL PROTEIN: 7.1 G/DL (ref 6.4–8.3)
TRIGL SERPL-MCNC: 148 MG/DL (ref 0–149)
VLDLC SERPL CALC-MCNC: 30 MG/DL

## 2021-03-08 PROCEDURE — 80053 COMPREHEN METABOLIC PANEL: CPT

## 2021-03-08 PROCEDURE — 80061 LIPID PANEL: CPT

## 2021-03-08 PROCEDURE — 83036 HEMOGLOBIN GLYCOSYLATED A1C: CPT

## 2021-03-08 PROCEDURE — 36415 COLL VENOUS BLD VENIPUNCTURE: CPT

## 2021-03-26 ENCOUNTER — OFFICE VISIT (OUTPATIENT)
Dept: ORTHOPEDIC SURGERY | Age: 81
End: 2021-03-26
Payer: MEDICARE

## 2021-03-26 VITALS — WEIGHT: 169 LBS | BODY MASS INDEX: 25.61 KG/M2 | HEIGHT: 68 IN

## 2021-03-26 DIAGNOSIS — Z98.890 S/P CLOSED REDUCTION OF DISLOCATED TOTAL HIP PROSTHESIS: Primary | ICD-10-CM

## 2021-03-26 PROCEDURE — 4040F PNEUMOC VAC/ADMIN/RCVD: CPT | Performed by: ORTHOPAEDIC SURGERY

## 2021-03-26 PROCEDURE — 1123F ACP DISCUSS/DSCN MKR DOCD: CPT | Performed by: ORTHOPAEDIC SURGERY

## 2021-03-26 PROCEDURE — G8399 PT W/DXA RESULTS DOCUMENT: HCPCS | Performed by: ORTHOPAEDIC SURGERY

## 2021-03-26 PROCEDURE — G8482 FLU IMMUNIZE ORDER/ADMIN: HCPCS | Performed by: ORTHOPAEDIC SURGERY

## 2021-03-26 PROCEDURE — G8417 CALC BMI ABV UP PARAM F/U: HCPCS | Performed by: ORTHOPAEDIC SURGERY

## 2021-03-26 PROCEDURE — G8427 DOCREV CUR MEDS BY ELIG CLIN: HCPCS | Performed by: ORTHOPAEDIC SURGERY

## 2021-03-26 PROCEDURE — 99213 OFFICE O/P EST LOW 20 MIN: CPT | Performed by: ORTHOPAEDIC SURGERY

## 2021-03-26 PROCEDURE — 1036F TOBACCO NON-USER: CPT | Performed by: ORTHOPAEDIC SURGERY

## 2021-03-26 PROCEDURE — 1090F PRES/ABSN URINE INCON ASSESS: CPT | Performed by: ORTHOPAEDIC SURGERY

## 2021-03-26 NOTE — PROGRESS NOTES
Chief Complaint:   Chief Complaint   Patient presents with    Hip Pain     Left hip follow up. Patient doing well. Cordell Castaneda this 10 and half weeks post dislocation of the left hip. She is doing pretty well. She is walking well. She gets occasional soreness in the left hip area. She has no other complaints at this time. She certainly does not want that to happen again. Allergies; medications; past medical, surgical, family, and social history; and problem list have been reviewed today and updated as indicated in this encounter seen below. Exam: Gait is smooth and balanced at a moderate pace. There is no tenderness palpation around the left hip area. Range of motion is good with no apprehension or feelings of instability. Knee range of motion is good as well. Radiographs: Previous imaging was reviewed showing stable alignment of the left total hip replacement components with little evidence of wear in the acetabular bearing. ASSESSMENT:    Natacha Blair was seen today for hip pain. Diagnoses and all orders for this visit:    S/P closed reduction of dislocated total hip prosthesis        PLAN: Activity with reasonable precautions to prevent repeat dislocation. We will follow-up on an as-needed basis    Return if symptoms worsen or fail to improve.        Current Outpatient Medications   Medication Sig Dispense Refill    atorvastatin (LIPITOR) 40 MG tablet Take 1 tablet by mouth daily Mon,wed,fri 90 tablet 1    amLODIPine-benazepril (LOTREL) 2.5-10 MG per capsule Take 1 capsule by mouth daily 90 capsule 1    aspirin 325 MG EC tablet Take 1 tablet by mouth 2 times daily for 28 days 56 tablet 0    amLODIPine (NORVASC) 5 MG tablet Take 1 tablet by mouth daily 90 tablet 1    vitamin D (CHOLECALCIFEROL) 1000 UNITS TABS tablet Take 2,000 Units by mouth daily      Red Yeast Rice 600 MG CAPS Take 2 capsules by mouth three times a week      Multiple Vitamins-Minerals (CENTRUM SILVER ADULT 50+ PO) Take  by mouth daily.  calcium-vitamin D (OSCAL 500/200 D-3) 500-200 MG-UNIT per tablet Take 1 tablet by mouth 2 times daily.  NONFORMULARY daily. Tunde Red       No current facility-administered medications for this visit. Patient Active Problem List   Diagnosis    Degenerative arthritis of hip R    Primary osteoarthritis of left knee    Essential hypertension    Familial hypercholesterolemia    Osteoporosis    Spinal stenosis of lumbar region    Lumbar radiculopathy    DDD (degenerative disc disease), lumbar    Synovial cyst of lumbar facet joint    Arthritis of right knee       Past Medical History:   Diagnosis Date    Arthritis     right hip    Cancer ([address]     breast 1997 approx. left    Hyperlipidemia     Hypertension     Osteoarthrosis     Osteoporosis     Other chronic postoperative pain     Undiagnosed cardiac murmurs        Past Surgical History:   Procedure Laterality Date    ANESTHESIA NERVE BLOCK Left 4/30/2019    LUMBAR EPIDURAL STERIOD INJECTION L4-5  (LEFT PARAMEDIAN APPROACH) performed by Lawson Fortune MD at [address] [place] [address]      biopsied on left breast    COLONOSCOPY      EPIDURAL STEROID INJECTION Left 3/19/2019    LEFT LUMBAR TRANSFORAMINAL EPIDURAL STEROID INJECTION: L4 / L5 performed by Lawson Fortune MD at [address]      right ankle    HYSTERECTOMY  1979    JOINT REPLACEMENT      L hip     JOINT REPLACEMENT  10/2013    R hip    JOINT REPLACEMENT  03/08/2016    L knee     JOINT REPLACEMENT  11/03/2020    R knee     NERVE BLOCK Left 03/19/2019    left lumbar transforaminal epidural    NERVE BLOCK Left 04/30/2019    lumbar    TONSILLECTOMY      TOTAL HIP ARTHROPLASTY Right Oct 2013    with cell saver    TOTAL KNEE ARTHROPLASTY Left 3/8/16    TOTAL KNEE ARTHROPLASTY Right 11/3/2020    TOTAL RIGHT KNEE REPLACEMENT/ ARTHROPLASTY [facility]) performed by Lovely Solis DO at [address]       No Known Allergies    Social History     Socioeconomic History    Marital status:      Spouse name: None    Number of children: None    Years of education: None    Highest education level: None   Occupational History    None   Social Needs    Financial resource strain: None    Food insecurity     Worry: None     Inability: None    Transportation needs     Medical: None     Non-medical: None   Tobacco Use    Smoking status: Former Smoker     Packs/day: 0.25     Years: 3.00     Pack years: 0.75     Quit date: 9/10/1998     Years since quittin.5    Smokeless tobacco: Never Used    Tobacco comment: quit smoking approx 20 yrs ago   Substance and Sexual Activity    Alcohol use: Yes     Comment: occasionally    Drug use: No    Sexual activity: None   Lifestyle    Physical activity     Days per week: None     Minutes per session: None    Stress: None   Relationships    Social connections     Talks on phone: None     Gets together: None     Attends [employer] service: None     Active member of club or organization: None     Attends meetings of clubs or organizations: None     Relationship status: None    Intimate partner violence     Fear of current or ex partner: None     Emotionally abused: None     Physically abused: None     Forced sexual activity: None   Other Topics Concern    None   Social History Narrative    None       Review of Systems  As follows except as previously noted in HPI:  Constitutional: Negative for chills, diaphoresis, fatigue, fever and unexpected weight change. Respiratory: Negative for cough, shortness of breath and wheezing. Cardiovascular: Negative for chest pain and palpitations. Neurological: Negative for dizziness, syncope, cephalgia. GI / : negative  Musculoskeletal: see HPI       Objective:   Physical Exam   Constitutional: Oriented to person, place, and time.  and appears well-developed and

## 2021-05-28 ENCOUNTER — OFFICE VISIT (OUTPATIENT)
Dept: ORTHOPEDIC SURGERY | Age: 81
End: 2021-05-28
Payer: MEDICARE

## 2021-05-28 VITALS — WEIGHT: 168 LBS | BODY MASS INDEX: 25.46 KG/M2 | TEMPERATURE: 98 F | HEIGHT: 68 IN

## 2021-05-28 DIAGNOSIS — M25.511 ACUTE PAIN OF RIGHT SHOULDER: Primary | ICD-10-CM

## 2021-05-28 DIAGNOSIS — M75.41 IMPINGEMENT SYNDROME OF RIGHT SHOULDER: ICD-10-CM

## 2021-05-28 PROCEDURE — G8427 DOCREV CUR MEDS BY ELIG CLIN: HCPCS | Performed by: ORTHOPAEDIC SURGERY

## 2021-05-28 PROCEDURE — G8399 PT W/DXA RESULTS DOCUMENT: HCPCS | Performed by: ORTHOPAEDIC SURGERY

## 2021-05-28 PROCEDURE — 1090F PRES/ABSN URINE INCON ASSESS: CPT | Performed by: ORTHOPAEDIC SURGERY

## 2021-05-28 PROCEDURE — G8417 CALC BMI ABV UP PARAM F/U: HCPCS | Performed by: ORTHOPAEDIC SURGERY

## 2021-05-28 PROCEDURE — 4040F PNEUMOC VAC/ADMIN/RCVD: CPT | Performed by: ORTHOPAEDIC SURGERY

## 2021-05-28 PROCEDURE — 1036F TOBACCO NON-USER: CPT | Performed by: ORTHOPAEDIC SURGERY

## 2021-05-28 PROCEDURE — 99213 OFFICE O/P EST LOW 20 MIN: CPT | Performed by: ORTHOPAEDIC SURGERY

## 2021-05-28 PROCEDURE — 1123F ACP DISCUSS/DSCN MKR DOCD: CPT | Performed by: ORTHOPAEDIC SURGERY

## 2021-05-28 NOTE — PROGRESS NOTES
Valeria Lundy is a 79 y.o. female, who presents   Chief Complaint   Patient presents with    Shoulder Pain     right shoulder pain started last week no injury that shes aware of. HPI[de-identified] Right shoulder pain is been present for about a month with no history of injury or incident. Patient has some difficulty with lifting her arm up. It bothers her at other times as well. She has had no numbness or tingling or any instability. Discomfort is indicated the lateral side of the arm in the deltoid region. Allergies; medications; past medical, surgical, family, and social history; and problem list have been reviewed today and updated as indicated in this encounter - see below following Ortho specifics. Musculoskeletal: Skin condition gross neurovascular function good in the right upper extremity. Finger wrist and elbow motion are good without instability or pain. The right shoulder has some limitations of active and passive range of motion particularly in elevation and external rotation. Her internal rotation is to near the right sacroiliac joint. Cross body abduction does not cause her that much pain. There is crepitus with elevation and rotation in the subacromial arch area. There is no instability of AC or glenohumeral joints. Rotator cuff elements seem grossly intact and strong. Radiologic Studies: Imaging studies of the right shoulder and 2 views showed some narrowing of the acromioclavicular joint. The humeral head is located well in the glenoid. The [facility] arch was not evaluated well because of positioning which was greatly related to the protracted position of the patient's shoulders. ASSESSMENT:  Peyton Bartlett was seen today for shoulder pain. Diagnoses and all orders for this visit:    Acute pain of right shoulder  -     XR SHOULDER RIGHT (MIN 2 VIEWS);  Future    Impingement syndrome of right shoulder     Treatment alternatives were reviewed including medical and physical therapies, injections, and surgical options, expected risks benefits and likely outcome of each were discussed in detail, questions asked and answered and understood. We discussed the symptoms as well as physical findings and imaging results. This is consistent with impingement type symptoms. PLAN: With the posture contributing, most reasonable treatment at this time would be physical therapy to help improve strength, stability, posture and comfort. We will schedule this and follow-up in about 4 weeks. Patient Active Problem List   Diagnosis    Degenerative arthritis of hip R    Primary osteoarthritis of left knee    Essential hypertension    Familial hypercholesterolemia    Osteoporosis    Spinal stenosis of lumbar region    Lumbar radiculopathy    DDD (degenerative disc disease), lumbar    Synovial cyst of lumbar facet joint    Arthritis of right knee       Past Medical History:   Diagnosis Date    Arthritis     right hip    Cancer [address] [address]     breast 1997 approx. left    Hyperlipidemia     Hypertension     Osteoarthrosis     Osteoporosis     Other chronic postoperative pain     Undiagnosed cardiac murmurs        Past Surgical History:   Procedure Laterality Date    ANESTHESIA NERVE BLOCK Left 4/30/2019    LUMBAR EPIDURAL STERIOD INJECTION L4-5  (LEFT PARAMEDIAN APPROACH) performed by Claribel Diaz MD at [facility]      biopsied on left breast    COLONOSCOPY      EPIDURAL STEROID INJECTION Left 3/19/2019    LEFT LUMBAR TRANSFORAMINAL EPIDURAL STEROID INJECTION: L4 / L5 performed by Claribel Diaz MD at [address]      cataracts OU    FRACTURE SURGERY      right ankle    HYSTERECTOMY  1979    JOINT REPLACEMENT      L hip     JOINT REPLACEMENT  10/2013    R hip    JOINT REPLACEMENT  03/08/2016    L knee     JOINT REPLACEMENT  11/03/2020    R knee     NERVE BLOCK Left 03/19/2019    left lumbar transforaminal epidural    NERVE BLOCK Left 2019    lumbar    TONSILLECTOMY      TOTAL HIP ARTHROPLASTY Right Oct 2013    with cell saver    TOTAL KNEE ARTHROPLASTY Left 3/8/16    TOTAL KNEE ARTHROPLASTY Right 11/3/2020    TOTAL RIGHT KNEE REPLACEMENT/ ARTHROPLASTY (BERNABE) performed by Linnette Donato DO at [address]       Current Outpatient Medications   Medication Sig Dispense Refill    atorvastatin (LIPITOR) 40 MG tablet Take 1 tablet by mouth daily Mon,wed,fri 90 tablet 1    amLODIPine-benazepril (LOTREL) 2.5-10 MG per capsule Take 1 capsule by mouth daily 90 capsule 1    amLODIPine (NORVASC) 5 MG tablet Take 1 tablet by mouth daily 90 tablet 1    vitamin D (CHOLECALCIFEROL) 1000 UNITS TABS tablet Take 2,000 Units by mouth daily      Red Yeast Rice 600 MG CAPS Take 2 capsules by mouth three times a week      Multiple Vitamins-Minerals (CENTRUM SILVER ADULT 50+ PO) Take  by mouth daily.  calcium-vitamin D (OSCAL 500/200 D-3) 500-200 MG-UNIT per tablet Take 1 tablet by mouth 2 times daily.  NONFORMULARY daily. Tunde Red      aspirin 325 MG EC tablet Take 1 tablet by mouth 2 times daily for 28 days 56 tablet 0     No current facility-administered medications for this visit. No Known Allergies    Social History     Socioeconomic History    Marital status:       Spouse name: None    Number of children: None    Years of education: None    Highest education level: None   Occupational History    None   Tobacco Use    Smoking status: Former Smoker     Packs/day: 0.25     Years: 3.00     Pack years: 0.75     Quit date: 9/10/1998     Years since quittin.7    Smokeless tobacco: Never Used    Tobacco comment: quit smoking approx 20 yrs ago   Substance and Sexual Activity    Alcohol use: Yes     Comment: occasionally    Drug use: No    Sexual activity: None   Other Topics Concern    None   Social History Narrative    None     Social Determinants of Health     Financial Resource Strain:     Difficulty of Paying Living Expenses:    Food Insecurity:     Worried About Running Out of Food in the Last Year:     [address] in the Last Year:    Transportation Needs:     Lack of Transportation (Medical):  Lack of Transportation (Non-Medical):    Physical Activity:     Days of Exercise per Week:     Minutes of Exercise per Session:    Stress:     Feeling of Stress :    Social Connections:     Frequency of Communication with Friends and Family:     Frequency of Social Gatherings with Friends and Family:     Attends [employer] Services:     Active Member of Clubs or Organizations:     Attends Club or Organization Meetings:     Marital Status:    Intimate Partner Violence:     Fear of Current or Ex-Partner:     Emotionally Abused:     Physically Abused:     Sexually Abused:        Family History   Problem Relation Age of Onset    Heart Disease Mother          Review of Systems:   As follows except as previously noted in HPI:  Constitutional: Negative for chills, diaphoresis,  fever   Respiratory: Negative for cough, shortness of breath and wheezing. Cardiovascular: Negative for chest pain and palpitations. Neurological: Negative for dizziness, syncope,   GI / : abdominal pain or cramping  Musculoskeletal: see HPI       Objective:   Physical Exam   Constitutional: Oriented to person, place, and time. and appears well-developed and well-nourished. :   Head: Normocephalic and atraumatic. Neck: Neck supple. Eyes: EOM are normal.   Pulmonary/Chest: Effort normal.  No respiratory distress, no wheezes. Neurological: Alert and oriented to person  Skin: Skin is warm and dry. Heather Mcdermott DO    5/28/21  12:18 PM    All reasonable efforts have been made to minimize the risk of errors that may occur in the use of voice recognition and other electronic means of charting.

## 2021-06-25 ENCOUNTER — OFFICE VISIT (OUTPATIENT)
Dept: ORTHOPEDIC SURGERY | Age: 81
End: 2021-06-25
Payer: MEDICARE

## 2021-06-25 VITALS — WEIGHT: 169 LBS | HEIGHT: 68 IN | TEMPERATURE: 98 F | BODY MASS INDEX: 25.61 KG/M2

## 2021-06-25 DIAGNOSIS — M25.511 ACUTE PAIN OF RIGHT SHOULDER: Primary | ICD-10-CM

## 2021-06-25 DIAGNOSIS — M75.41 IMPINGEMENT SYNDROME OF RIGHT SHOULDER: ICD-10-CM

## 2021-06-25 PROCEDURE — 4040F PNEUMOC VAC/ADMIN/RCVD: CPT | Performed by: ORTHOPAEDIC SURGERY

## 2021-06-25 PROCEDURE — G8427 DOCREV CUR MEDS BY ELIG CLIN: HCPCS | Performed by: ORTHOPAEDIC SURGERY

## 2021-06-25 PROCEDURE — G8417 CALC BMI ABV UP PARAM F/U: HCPCS | Performed by: ORTHOPAEDIC SURGERY

## 2021-06-25 PROCEDURE — 99213 OFFICE O/P EST LOW 20 MIN: CPT | Performed by: ORTHOPAEDIC SURGERY

## 2021-06-25 PROCEDURE — 1036F TOBACCO NON-USER: CPT | Performed by: ORTHOPAEDIC SURGERY

## 2021-06-25 PROCEDURE — 1090F PRES/ABSN URINE INCON ASSESS: CPT | Performed by: ORTHOPAEDIC SURGERY

## 2021-06-25 PROCEDURE — G8399 PT W/DXA RESULTS DOCUMENT: HCPCS | Performed by: ORTHOPAEDIC SURGERY

## 2021-06-25 PROCEDURE — 1123F ACP DISCUSS/DSCN MKR DOCD: CPT | Performed by: ORTHOPAEDIC SURGERY

## 2021-06-25 NOTE — PROGRESS NOTES
Chief Complaint:   Chief Complaint   Patient presents with    Shoulder Pain     f/u right shoulder pain after PT. Catalina Tanner follows up for her right shoulder. She is apparently done with physical therapy and had 8 sessions. The therapist recommended more sessions and this sounds like an approval issue which we will address. [facility] still complains of some discomfort in the arm though she says it has gotten better. Allergies; medications; past medical, surgical, family, and social history; and problem list have been reviewed today and updated as indicated in this encounter seen below. Exam: Range of motion is fairly good. There is some limitation of elevation and also abduction and rotation. She stands with the shoulders protracted a fair amount which I think contributes to the discomfort. Rotator cuff strength seems to be good at this point. Radiographs: No gross bony abnormalities were demonstrated on the previous x-rays. ASSESSMENT:    Vivian Olivarez was seen today for shoulder pain. Diagnoses and all orders for this visit:    Acute pain of right shoulder    Impingement syndrome of right shoulder        PLAN: We will continue with physical therapy and follow-up in about 4 weeks. She also needs to do home exercises after these are instructed as well. Return in about 4 weeks (around 7/23/2021). Current Outpatient Medications   Medication Sig Dispense Refill    atorvastatin (LIPITOR) 40 MG tablet Take 1 tablet by mouth daily Mon,wed,fri 90 tablet 1    amLODIPine-benazepril (LOTREL) 2.5-10 MG per capsule Take 1 capsule by mouth daily 90 capsule 1    amLODIPine (NORVASC) 5 MG tablet Take 1 tablet by mouth daily 90 tablet 1    vitamin D (CHOLECALCIFEROL) 1000 UNITS TABS tablet Take 2,000 Units by mouth daily      Red Yeast Rice 600 MG CAPS Take 2 capsules by mouth three times a week      Multiple Vitamins-Minerals (CENTRUM SILVER ADULT 50+ PO) Take  by mouth daily.       calcium-vitamin D (OSCAL 500/200 D-3) 500-200 MG-UNIT per tablet Take 1 tablet by mouth 2 times daily.  NONFORMULARY daily. Tunde Red      aspirin 325 MG EC tablet Take 1 tablet by mouth 2 times daily for 28 days 56 tablet 0     No current facility-administered medications for this visit. Patient Active Problem List   Diagnosis    Degenerative arthritis of hip R    Primary osteoarthritis of left knee    Essential hypertension    Familial hypercholesterolemia    Osteoporosis    Spinal stenosis of lumbar region    Lumbar radiculopathy    DDD (degenerative disc disease), lumbar    Synovial cyst of lumbar facet joint    Arthritis of right knee       Past Medical History:   Diagnosis Date    Arthritis     right hip    Cancer ([address]     breast 1997 approx. left    Hyperlipidemia     Hypertension     Osteoarthrosis     Osteoporosis     Other chronic postoperative pain     Undiagnosed cardiac murmurs        Past Surgical History:   Procedure Laterality Date    ANESTHESIA NERVE BLOCK Left 4/30/2019    LUMBAR EPIDURAL STERIOD INJECTION L4-5  (LEFT PARAMEDIAN APPROACH) performed by Imtiaz Harris MD at [address] [place] [address]      biopsied on left breast    COLONOSCOPY      EPIDURAL STEROID INJECTION Left 3/19/2019    LEFT LUMBAR TRANSFORAMINAL EPIDURAL STEROID INJECTION: L4 / L5 performed by Imtiaz Harris MD at [address]      right ankle    HYSTERECTOMY  1979    JOINT REPLACEMENT      L hip     JOINT REPLACEMENT  10/2013    R hip    JOINT REPLACEMENT  03/08/2016    L knee     JOINT REPLACEMENT  11/03/2020    R knee     NERVE BLOCK Left 03/19/2019    left lumbar transforaminal epidural    NERVE BLOCK Left 04/30/2019    lumbar    TONSILLECTOMY      TOTAL HIP ARTHROPLASTY Right Oct 2013    with cell saver    TOTAL KNEE ARTHROPLASTY Left 3/8/16    TOTAL KNEE ARTHROPLASTY Right 11/3/2020 TOTAL RIGHT KNEE REPLACEMENT/ ARTHROPLASTY (BERNABE) performed by CHI Lindquist DO at [id number]       No Known Allergies    Social History     Socioeconomic History    Marital status:      Spouse name: Not on file    Number of children: Not on file    Years of education: Not on file    Highest education level: Not on file   Occupational History    Not on file   Tobacco Use    Smoking status: Former Smoker     Packs/day: 0.25     Years: 3.00     Pack years: 0.75     Quit date: 9/10/1998     Years since quittin.8    Smokeless tobacco: Never Used    Tobacco comment: quit smoking approx 20 yrs ago   Substance and Sexual Activity    Alcohol use: Yes     Comment: occasionally    Drug use: No    Sexual activity: Not on file   Other Topics Concern    Not on file   Social History Narrative    Not on file     Social Determinants of Health     Financial Resource Strain:     Difficulty of Paying Living Expenses:    Food Insecurity:     Worried About Running Out of Food in the Last Year:     [address] in the Last Year:    Transportation Needs:     Lack of Transportation (Medical):  Lack of Transportation (Non-Medical):    Physical Activity:     Days of Exercise per Week:     Minutes of Exercise per Session:    Stress:     Feeling of Stress :    Social Connections:     Frequency of Communication with Friends and Family:     Frequency of Social Gatherings with Friends and Family:     Attends [employer] Services:     Active Member of Clubs or Organizations:     Attends Club or Organization Meetings:     Marital Status:    Intimate Partner Violence:     Fear of Current or Ex-Partner:     Emotionally Abused:     Physically Abused:     Sexually Abused:        Review of Systems  As follows except as previously noted in HPI:  Constitutional: Negative for chills, diaphoresis, fatigue, fever and unexpected weight change.    Respiratory: Negative for cough, shortness of breath and wheezing. Cardiovascular: Negative for chest pain and palpitations. Neurological: Negative for dizziness, syncope, cephalgia. GI / : negative  Musculoskeletal: see HPI       Objective:   Physical Exam   Constitutional: Oriented to person, place, and time. and appears well-developed and well-nourished. :   Head: Normocephalic and atraumatic. Eyes: EOM are normal.   Neck: Neck supple. Cardiovascular: Normal rate and regular rhythm. Pulmonary/Chest: Effort normal. No stridor. No respiratory distress, no wheezes. Abdominal:  No abnormal distension. Neurological: Alert and oriented to person, place, and time. Skin: Skin is warm and dry. Psychiatric: Normal mood and affect.  Behavior is normal. Thought content normal.    CHI Reynoso DO    6/25/21  12:08 PM

## 2021-07-23 ENCOUNTER — OFFICE VISIT (OUTPATIENT)
Dept: ORTHOPEDIC SURGERY | Age: 81
End: 2021-07-23
Payer: MEDICARE

## 2021-07-23 VITALS — HEIGHT: 68 IN | WEIGHT: 169 LBS | BODY MASS INDEX: 25.61 KG/M2

## 2021-07-23 DIAGNOSIS — M75.41 IMPINGEMENT SYNDROME OF RIGHT SHOULDER: Primary | ICD-10-CM

## 2021-07-23 PROCEDURE — G8427 DOCREV CUR MEDS BY ELIG CLIN: HCPCS | Performed by: ORTHOPAEDIC SURGERY

## 2021-07-23 PROCEDURE — G8399 PT W/DXA RESULTS DOCUMENT: HCPCS | Performed by: ORTHOPAEDIC SURGERY

## 2021-07-23 PROCEDURE — 1090F PRES/ABSN URINE INCON ASSESS: CPT | Performed by: ORTHOPAEDIC SURGERY

## 2021-07-23 PROCEDURE — 1036F TOBACCO NON-USER: CPT | Performed by: ORTHOPAEDIC SURGERY

## 2021-07-23 PROCEDURE — 1123F ACP DISCUSS/DSCN MKR DOCD: CPT | Performed by: ORTHOPAEDIC SURGERY

## 2021-07-23 PROCEDURE — 99213 OFFICE O/P EST LOW 20 MIN: CPT | Performed by: ORTHOPAEDIC SURGERY

## 2021-07-23 PROCEDURE — 4040F PNEUMOC VAC/ADMIN/RCVD: CPT | Performed by: ORTHOPAEDIC SURGERY

## 2021-07-23 PROCEDURE — G8417 CALC BMI ABV UP PARAM F/U: HCPCS | Performed by: ORTHOPAEDIC SURGERY

## 2021-07-23 NOTE — PROGRESS NOTES
Chief Complaint:   Chief Complaint   Patient presents with    Shoulder Pain     Right shoulder follow up. Shoulder is doing fine with PT/HEP       Marla Allen follows up for right shoulder problems. She is done with physical therapy and continues to do her exercises at home. She says the arm feels a little bit weak yet but range of motion is good and she is working on it. Allergies; medications; past medical, surgical, family, and social history; and problem list have been reviewed today and updated as indicated in this encounter seen below. Exam: Range of motion in the right shoulder spontaneously is very good. There is little less than full elevation but no discomfort or other complaints. Internal and external rotation are good. Stability is good. Radiographs: None    ASSESSMENT:    Jonatan Marcos was seen today for shoulder pain. Diagnoses and all orders for this visit:    Impingement syndrome of right shoulder        PLAN: Continue home exercises as instructed. We will follow-up as previously scheduled for her right knee arthroplasty. Return if symptoms worsen or fail to improve. Current Outpatient Medications   Medication Sig Dispense Refill    atorvastatin (LIPITOR) 40 MG tablet Take 1 tablet by mouth daily Mon,wed,fri 90 tablet 1    amLODIPine-benazepril (LOTREL) 2.5-10 MG per capsule Take 1 capsule by mouth daily 90 capsule 1    aspirin 325 MG EC tablet Take 1 tablet by mouth 2 times daily for 28 days 56 tablet 0    amLODIPine (NORVASC) 5 MG tablet Take 1 tablet by mouth daily 90 tablet 1    vitamin D (CHOLECALCIFEROL) 1000 UNITS TABS tablet Take 2,000 Units by mouth daily      Red Yeast Rice 600 MG CAPS Take 2 capsules by mouth three times a week      Multiple Vitamins-Minerals (CENTRUM SILVER ADULT 50+ PO) Take  by mouth daily.  calcium-vitamin D (OSCAL 500/200 D-3) 500-200 MG-UNIT per tablet Take 1 tablet by mouth 2 times daily.  NONFORMULARY daily.  Tunde Red No current facility-administered medications for this visit. Patient Active Problem List   Diagnosis    Degenerative arthritis of hip R    Primary osteoarthritis of left knee    Essential hypertension    Familial hypercholesterolemia    Osteoporosis    Spinal stenosis of lumbar region    Lumbar radiculopathy    DDD (degenerative disc disease), lumbar    Synovial cyst of lumbar facet joint    Arthritis of right knee       Past Medical History:   Diagnosis Date    Arthritis     right hip    Cancer ([facility] [address]     breast 1997 approx. left    Hyperlipidemia     Hypertension     Osteoarthrosis     Osteoporosis     Other chronic postoperative pain     Undiagnosed cardiac murmurs        Past Surgical History:   Procedure Laterality Date    ANESTHESIA NERVE BLOCK Left 4/30/2019    LUMBAR EPIDURAL STERIOD INJECTION L4-5  (LEFT PARAMEDIAN APPROACH) performed by Gailen Cockayne, MD at [address] [place] [address]      biopsied on left breast    COLONOSCOPY      EPIDURAL STEROID INJECTION Left 3/19/2019    LEFT LUMBAR TRANSFORAMINAL EPIDURAL STEROID INJECTION: L4 / L5 performed by Gailen Cockayne, MD at [address]      right ankle    HYSTERECTOMY  1979    JOINT REPLACEMENT      L hip     JOINT REPLACEMENT  10/2013    R hip    JOINT REPLACEMENT  03/08/2016    L knee     JOINT REPLACEMENT  11/03/2020    R knee     NERVE BLOCK Left 03/19/2019    left lumbar transforaminal epidural    NERVE BLOCK Left 04/30/2019    lumbar    TONSILLECTOMY      TOTAL HIP ARTHROPLASTY Right Oct 2013    with cell saver    TOTAL KNEE ARTHROPLASTY Left 3/8/16    TOTAL KNEE ARTHROPLASTY Right 11/3/2020    TOTAL RIGHT KNEE REPLACEMENT/ ARTHROPLASTY (BERNABE) performed by Sotero Dallas DO at [address]       No Known Allergies    Social History     Socioeconomic History    Marital status:       Spouse name: None    Number of children: None    Years of education: None    Highest education level: None   Occupational History    None   Tobacco Use    Smoking status: Former Smoker     Packs/day: 0.25     Years: 3.00     Pack years: 0.75     Quit date: 9/10/1998     Years since quittin.8    Smokeless tobacco: Never Used    Tobacco comment: quit smoking approx 20 yrs ago   Substance and Sexual Activity    Alcohol use: Yes     Comment: occasionally    Drug use: No    Sexual activity: None   Other Topics Concern    None   Social History Narrative    None     Social Determinants of Health     Financial Resource Strain:     Difficulty of Paying Living Expenses:    Food Insecurity:     Worried About Running Out of Food in the Last Year:     Ran Out of Food in the Last Year:    Transportation Needs:     Lack of Transportation (Medical):  Lack of Transportation (Non-Medical):    Physical Activity:     Days of Exercise per Week:     Minutes of Exercise per Session:    Stress:     Feeling of Stress :    Social Connections:     Frequency of Communication with Friends and Family:     Frequency of Social Gatherings with Friends and Family:     Attends Taoism Services:     Active Member of Clubs or Organizations:     Attends Club or Organization Meetings:     Marital Status:    Intimate Partner Violence:     Fear of Current or Ex-Partner:     Emotionally Abused:     Physically Abused:     Sexually Abused:        Review of Systems  As follows except as previously noted in HPI:  Constitutional: Negative for chills, diaphoresis, fatigue, fever and unexpected weight change. Respiratory: Negative for cough, shortness of breath and wheezing. Cardiovascular: Negative for chest pain and palpitations. Neurological: Negative for dizziness, syncope, cephalgia. GI / : negative  Musculoskeletal: see HPI       Objective:   Physical Exam   Constitutional: Oriented to person, place, and time.  and appears well-developed and well-nourished. :   Head: Normocephalic and atraumatic. Eyes: EOM are normal.   Neck: Neck supple. Cardiovascular: Normal rate and regular rhythm. Pulmonary/Chest: Effort normal. No stridor. No respiratory distress, no wheezes. Abdominal:  No abnormal distension. Neurological: Alert and oriented to person, place, and time. Skin: Skin is warm and dry. Psychiatric: Normal mood and affect.  Behavior is normal. Thought content normal.    CHI Baird DO    7/23/21  8:33 AM

## 2021-09-27 ENCOUNTER — HOSPITAL ENCOUNTER (OUTPATIENT)
Age: 81
Discharge: HOME | End: 2021-09-27
Payer: MEDICARE

## 2021-09-27 DIAGNOSIS — E78.01 FAMILIAL HYPERCHOLESTEROLEMIA: ICD-10-CM

## 2021-09-27 DIAGNOSIS — R73.9 HYPERGLYCEMIA: ICD-10-CM

## 2021-09-27 LAB
ALBUMIN SERPL-MCNC: 4.4 G/DL (ref 3.5–5.2)
ALP BLD-CCNC: 47 U/L (ref 35–104)
ALT SERPL-CCNC: 18 U/L (ref 0–32)
ANION GAP SERPL CALCULATED.3IONS-SCNC: 8 MMOL/L (ref 7–16)
AST SERPL-CCNC: 18 U/L (ref 0–31)
BILIRUB SERPL-MCNC: 0.5 MG/DL (ref 0–1.2)
BUN BLDV-MCNC: 25 MG/DL (ref 6–23)
CALCIUM SERPL-MCNC: 10.1 MG/DL (ref 8.6–10.2)
CHLORIDE BLD-SCNC: 103 MMOL/L (ref 98–107)
CHOLESTEROL, TOTAL: 150 MG/DL (ref 0–199)
CO2: 30 MMOL/L (ref 22–29)
CREAT SERPL-MCNC: 0.9 MG/DL (ref 0.5–1)
GFR AFRICAN AMERICAN: >60
GFR NON-AFRICAN AMERICAN: 60 ML/MIN/1.73
GLUCOSE BLD-MCNC: 97 MG/DL (ref 74–99)
HBA1C MFR BLD: 5.8 % (ref 4–5.6)
HDLC SERPL-MCNC: 53 MG/DL
LDL CHOLESTEROL CALCULATED: 69 MG/DL (ref 0–99)
POTASSIUM SERPL-SCNC: 4.5 MMOL/L (ref 3.5–5)
SODIUM BLD-SCNC: 141 MMOL/L (ref 132–146)
TOTAL PROTEIN: 6.9 G/DL (ref 6.4–8.3)
TRIGL SERPL-MCNC: 139 MG/DL (ref 0–149)
VLDLC SERPL CALC-MCNC: 28 MG/DL

## 2021-09-27 PROCEDURE — 36415 COLL VENOUS BLD VENIPUNCTURE: CPT

## 2021-09-27 PROCEDURE — 80053 COMPREHEN METABOLIC PANEL: CPT

## 2021-09-27 PROCEDURE — 80061 LIPID PANEL: CPT

## 2021-09-27 PROCEDURE — 83036 HEMOGLOBIN GLYCOSYLATED A1C: CPT

## 2021-11-01 DIAGNOSIS — Z96.651 S/P TOTAL KNEE REPLACEMENT USING CEMENT, RIGHT: Primary | ICD-10-CM

## 2021-11-03 ENCOUNTER — OFFICE VISIT (OUTPATIENT)
Dept: ORTHOPEDIC SURGERY | Age: 81
End: 2021-11-03
Payer: MEDICARE

## 2021-11-03 VITALS — HEIGHT: 68 IN | TEMPERATURE: 98 F | WEIGHT: 169 LBS | BODY MASS INDEX: 25.61 KG/M2

## 2021-11-03 DIAGNOSIS — Z96.651 S/P TOTAL KNEE REPLACEMENT USING CEMENT, RIGHT: Primary | ICD-10-CM

## 2021-11-03 PROCEDURE — 1123F ACP DISCUSS/DSCN MKR DOCD: CPT | Performed by: ORTHOPAEDIC SURGERY

## 2021-11-03 PROCEDURE — G8427 DOCREV CUR MEDS BY ELIG CLIN: HCPCS | Performed by: ORTHOPAEDIC SURGERY

## 2021-11-03 PROCEDURE — G8484 FLU IMMUNIZE NO ADMIN: HCPCS | Performed by: ORTHOPAEDIC SURGERY

## 2021-11-03 PROCEDURE — 1090F PRES/ABSN URINE INCON ASSESS: CPT | Performed by: ORTHOPAEDIC SURGERY

## 2021-11-03 PROCEDURE — G8399 PT W/DXA RESULTS DOCUMENT: HCPCS | Performed by: ORTHOPAEDIC SURGERY

## 2021-11-03 PROCEDURE — 4040F PNEUMOC VAC/ADMIN/RCVD: CPT | Performed by: ORTHOPAEDIC SURGERY

## 2021-11-03 PROCEDURE — 99213 OFFICE O/P EST LOW 20 MIN: CPT | Performed by: ORTHOPAEDIC SURGERY

## 2021-11-03 PROCEDURE — G8417 CALC BMI ABV UP PARAM F/U: HCPCS | Performed by: ORTHOPAEDIC SURGERY

## 2021-11-03 PROCEDURE — 1036F TOBACCO NON-USER: CPT | Performed by: ORTHOPAEDIC SURGERY

## 2021-11-03 NOTE — PROGRESS NOTES
Chief Complaint:   Chief Complaint   Patient presents with    Knee Pain     Right TKA DOS 11/03/2020       Fabian Palomino follows up 1 year postop right total knee replacement arthroplasty. She is doing real well. She does notice that she has some crepitus above her kneecap when she flexes and extends on occasions. It does not hurt her and she has good function in the knee. Allergies; medications; past medical, surgical, family, and social history; and problem list have been reviewed today and updated as indicated in this encounter seen below. Exam: The incision is well-healed. She has no effusion and no redness in the knee. She has stable collaterals and she has good dynamic anterior and posterior stability. Range of motion is near 0 to 110 degrees of flexion or more. The knee is stable. There is some crepitus suprapatellar which is most likely some scar tissue moving across the trochlear flange. Radiographs: Imaging of the right knee in 2 view shows a stable cemented total knee replacement arthroplasty in good alignment. ASSESSMENT:    Logan Mccain was seen today for knee pain. Diagnoses and all orders for this visit:    S/P total knee replacement using cement, right        PLAN: Activity as tolerated. We discussed surgical precautions. Logan Mccain will follow up on an as-needed basis. Return if symptoms worsen or fail to improve.        Current Outpatient Medications   Medication Sig Dispense Refill    atorvastatin (LIPITOR) 40 MG tablet Take 1 tablet by mouth daily Mon,wed,fri 90 tablet 1    amLODIPine-benazepril (LOTREL) 2.5-10 MG per capsule Take 1 capsule by mouth daily 90 capsule 1    amLODIPine (NORVASC) 5 MG tablet Take 1 tablet by mouth daily 90 tablet 1    vitamin D (CHOLECALCIFEROL) 1000 UNITS TABS tablet Take 2,000 Units by mouth daily      Red Yeast Rice 600 MG CAPS Take 2 capsules by mouth three times a week      Multiple Vitamins-Minerals (CENTRUM SILVER ADULT 50+ PO) Take  by mouth daily.  calcium-vitamin D (OSCAL 500/200 D-3) 500-200 MG-UNIT per tablet Take 1 tablet by mouth 2 times daily.  NONFORMULARY daily. Tunde Red      aspirin 325 MG EC tablet Take 1 tablet by mouth 2 times daily for 28 days 56 tablet 0     No current facility-administered medications for this visit. Patient Active Problem List   Diagnosis    Degenerative arthritis of hip R    Primary osteoarthritis of left knee    Essential hypertension    Familial hypercholesterolemia    Osteoporosis    Spinal stenosis of lumbar region    Lumbar radiculopathy    DDD (degenerative disc disease), lumbar    Synovial cyst of lumbar facet joint    Arthritis of right knee       Past Medical History:   Diagnosis Date    Arthritis     right hip    Cancer ([facility] [address]     breast 1997 approx. left    Hyperlipidemia     Hypertension     Osteoarthrosis     Osteoporosis     Other chronic postoperative pain     Undiagnosed cardiac murmurs        Past Surgical History:   Procedure Laterality Date    ANESTHESIA NERVE BLOCK Left 4/30/2019    LUMBAR EPIDURAL STERIOD INJECTION L4-5  (LEFT PARAMEDIAN APPROACH) performed by Emma Buck MD at [facility]      biopsied on left breast    COLONOSCOPY      EPIDURAL STEROID INJECTION Left 3/19/2019    LEFT LUMBAR TRANSFORAMINAL EPIDURAL STEROID INJECTION: L4 / L5 performed by Emma Buck MD at [address] [place] [address]      cataracts OU    FRACTURE SURGERY      right ankle    HYSTERECTOMY  1979    JOINT REPLACEMENT      L hip     JOINT REPLACEMENT  10/2013    R hip    JOINT REPLACEMENT  03/08/2016    L knee     JOINT REPLACEMENT  11/03/2020    R knee     NERVE BLOCK Left 03/19/2019    left lumbar transforaminal epidural    NERVE BLOCK Left 04/30/2019    lumbar    TONSILLECTOMY      TOTAL HIP ARTHROPLASTY Right Oct 2013    with cell saver    TOTAL KNEE ARTHROPLASTY Left 3/8/16    TOTAL KNEE ARTHROPLASTY Right 11/3/2020    TOTAL RIGHT KNEE REPLACEMENT/ ARTHROPLASTY (BERNABE) performed by Arlene Oconnor DO at [address]       No Known Allergies    Social History     Socioeconomic History    Marital status:      Spouse name: None    Number of children: None    Years of education: None    Highest education level: None   Occupational History    None   Tobacco Use    Smoking status: Former Smoker     Packs/day: 0.25     Years: 3.00     Pack years: 0.75     Quit date: 9/10/1998     Years since quittin.1    Smokeless tobacco: Never Used    Tobacco comment: quit smoking approx 20 yrs ago   Substance and Sexual Activity    Alcohol use: Yes     Comment: occasionally    Drug use: No    Sexual activity: None   Other Topics Concern    None   Social History Narrative    None     Social Determinants of Health     Financial Resource Strain: Low Risk     Difficulty of Paying Living Expenses: Not hard at all   Food Insecurity: No Food Insecurity    Worried About Running Out of Food in the Last Year: Never true    Dot of Food in the Last Year: Never true   Transportation Needs:     Lack of Transportation (Medical):  Lack of Transportation (Non-Medical):    Physical Activity:     Days of Exercise per Week:     Minutes of Exercise per Session:    Stress:     Feeling of Stress :    Social Connections:     Frequency of Communication with Friends and Family:     Frequency of Social Gatherings with Friends and Family:     Attends [employer] Services:     Active Member of Clubs or Organizations:     Attends Club or Organization Meetings:     Marital Status:    Intimate Partner Violence:     Fear of Current or Ex-Partner:     Emotionally Abused:     Physically Abused:     Sexually Abused:        Review of Systems  As follows except as previously noted in HPI:  Constitutional: Negative for chills, diaphoresis, fatigue, fever and unexpected weight change.    Respiratory: Negative for cough, shortness of breath and wheezing. Cardiovascular: Negative for chest pain and palpitations. Neurological: Negative for dizziness, syncope, cephalgia. GI / : negative  Musculoskeletal: see HPI       Objective:   Physical Exam   Constitutional: Oriented to person, place, and time. and appears well-developed and well-nourished. :   Head: Normocephalic and atraumatic. Eyes: EOM are normal.   Neck: Neck supple. Cardiovascular: Normal rate and regular rhythm. Pulmonary/Chest: Effort normal. No stridor. No respiratory distress, no wheezes. Abdominal:  No abnormal distension. Neurological: Alert and oriented to person, place, and time. Skin: Skin is warm and dry. Psychiatric: Normal mood and affect.  Behavior is normal. Thought content normal.    CHI Duarte DO    11/3/21  9:26 AM

## 2021-11-24 ENCOUNTER — APPOINTMENT (OUTPATIENT)
Dept: GENERAL RADIOLOGY | Age: 81
End: 2021-11-24
Payer: MEDICARE

## 2021-11-24 ENCOUNTER — HOSPITAL ENCOUNTER (EMERGENCY)
Age: 81
Discharge: HOME | End: 2021-11-24
Attending: EMERGENCY MEDICINE
Payer: MEDICARE

## 2021-11-24 VITALS
BODY MASS INDEX: 25.91 KG/M2 | RESPIRATION RATE: 20 BRPM | HEIGHT: 68 IN | HEART RATE: 85 BPM | WEIGHT: 171 LBS | TEMPERATURE: 97.7 F | SYSTOLIC BLOOD PRESSURE: 124 MMHG | DIASTOLIC BLOOD PRESSURE: 70 MMHG | OXYGEN SATURATION: 97 %

## 2021-11-24 DIAGNOSIS — S73.005A CLOSED DISLOCATION OF LEFT HIP, INITIAL ENCOUNTER (HCC): Primary | ICD-10-CM

## 2021-11-24 PROCEDURE — 96361 HYDRATE IV INFUSION ADD-ON: CPT

## 2021-11-24 PROCEDURE — 6360000002 HC RX W HCPCS

## 2021-11-24 PROCEDURE — 99152 MOD SED SAME PHYS/QHP 5/>YRS: CPT

## 2021-11-24 PROCEDURE — 27265 TREAT HIP DISLOCATION: CPT

## 2021-11-24 PROCEDURE — 2500000003 HC RX 250 WO HCPCS: Performed by: EMERGENCY MEDICINE

## 2021-11-24 PROCEDURE — 99284 EMERGENCY DEPT VISIT MOD MDM: CPT

## 2021-11-24 PROCEDURE — 2500000003 HC RX 250 WO HCPCS

## 2021-11-24 PROCEDURE — 96374 THER/PROPH/DIAG INJ IV PUSH: CPT

## 2021-11-24 PROCEDURE — 73502 X-RAY EXAM HIP UNI 2-3 VIEWS: CPT

## 2021-11-24 PROCEDURE — 2580000003 HC RX 258

## 2021-11-24 RX ORDER — ONDANSETRON 2 MG/ML
4 INJECTION INTRAMUSCULAR; INTRAVENOUS ONCE
Status: COMPLETED | OUTPATIENT
Start: 2021-11-24 | End: 2021-11-24

## 2021-11-24 RX ORDER — KETAMINE HYDROCHLORIDE 10 MG/ML
40 INJECTION, SOLUTION INTRAMUSCULAR; INTRAVENOUS ONCE
Status: COMPLETED | OUTPATIENT
Start: 2021-11-24 | End: 2021-11-24

## 2021-11-24 RX ORDER — 0.9 % SODIUM CHLORIDE 0.9 %
1000 INTRAVENOUS SOLUTION INTRAVENOUS ONCE
Status: COMPLETED | OUTPATIENT
Start: 2021-11-24 | End: 2021-11-24

## 2021-11-24 RX ORDER — KETAMINE HYDROCHLORIDE 10 MG/ML
1.5 INJECTION, SOLUTION INTRAMUSCULAR; INTRAVENOUS ONCE
Status: COMPLETED | OUTPATIENT
Start: 2021-11-24 | End: 2021-11-24

## 2021-11-24 RX ADMIN — ONDANSETRON 4 MG: 2 INJECTION INTRAMUSCULAR; INTRAVENOUS at 12:51

## 2021-11-24 RX ADMIN — SODIUM CHLORIDE 1000 ML: 9 INJECTION, SOLUTION INTRAVENOUS at 11:50

## 2021-11-24 RX ADMIN — KETAMINE HYDROCHLORIDE 40 MG: 10 INJECTION INTRAMUSCULAR; INTRAVENOUS at 12:03

## 2021-11-24 RX ADMIN — KETAMINE HYDROCHLORIDE 116.4 MG: 10 INJECTION INTRAMUSCULAR; INTRAVENOUS at 12:04

## 2021-11-24 ASSESSMENT — ENCOUNTER SYMPTOMS
COUGH: 0
NAUSEA: 0
VOMITING: 0
SHORTNESS OF BREATH: 0
ABDOMINAL PAIN: 0
CHEST TIGHTNESS: 0
DIARRHEA: 0
WHEEZING: 0
CONSTIPATION: 0
BLOOD IN STOOL: 0
BACK PAIN: 0

## 2021-11-24 ASSESSMENT — PAIN SCALES - GENERAL: PAINLEVEL_OUTOF10: 6

## 2021-11-24 NOTE — ED NOTES
Bed: 06  Expected date:   Expected time:   Means of arrival:   Comments:  [address] Allen RN  11/24/21 [id number]

## 2021-11-24 NOTE — ED PROVIDER NOTES
[address]      Pt Name: Flora Goncalves  MRN: [id number]  [place] 1940  Date of evaluation: 11/24/2021      CHIEF COMPLAINT       Chief Complaint   Patient presents with    Hip Pain     left hip possible dislocation, bent over to adjust socks and felt hip go out        HPI  Flora Goncalves is a 79 y.o. female presents with hip injury. States that this morning she bent over to adjust her socks while she was sitting and felt her hip, patient has a history of dislocations, she had bilateral hip replacement surgery 15 years ago(with Dr. Savannah Walters) and has had a dislocation of the L hip on 01/2021, which was successfully reduced in the ED. Patient has also had bilateral TKA (L- 10 yrs ago, R- 2020). Patient denies any use of blood thinners. She ambulates without assistance at home. States that her pain is sharp over L hip region, non radiating, rated 6 out of 10, worse with movement and alleviated with rest.  Currently denies any numbness, tingling, weakness, and color changes, no falls, trauma or LOC. She denies any reactions to anesthetics, NKA. COVID vaccinated      Except as noted above the remainder of the review of systems was reviewed and negative. Review of Systems   Constitutional: Negative for activity change, appetite change, chills, fatigue and fever. HENT: Negative for congestion, nosebleeds and tinnitus. Eyes: Negative for visual disturbance. Respiratory: Negative for cough, chest tightness, shortness of breath and wheezing. Cardiovascular: Negative for chest pain, palpitations and leg swelling. Gastrointestinal: Negative for abdominal pain, blood in stool, constipation, diarrhea, nausea and vomiting. Endocrine: Negative for polydipsia and polyphagia. Genitourinary: Negative for dysuria, flank pain, hematuria, vaginal bleeding, vaginal discharge and vaginal pain.    Musculoskeletal: Positive for gait problem. Negative for back pain, joint swelling and myalgias. Skin: Negative for rash. Allergic/Immunologic: Negative for immunocompromised state. Neurological: Negative for dizziness, syncope, weakness, numbness and headaches. Hematological: Negative for adenopathy. Psychiatric/Behavioral: Negative for behavioral problems, confusion and hallucinations. Physical Exam  Constitutional:       General: She is not in acute distress. Appearance: Normal appearance. She is normal weight. She is not ill-appearing, toxic-appearing or diaphoretic. HENT:      Head: Normocephalic and atraumatic. Right Ear: External ear normal.      Left Ear: External ear normal.      Nose: Nose normal. No congestion or rhinorrhea. Mouth/Throat:      Mouth: Mucous membranes are moist.      Pharynx: Oropharynx is clear. No oropharyngeal exudate or posterior oropharyngeal erythema. Eyes:      Conjunctiva/sclera: Conjunctivae normal.      Pupils: Pupils are equal, round, and reactive to light. Cardiovascular:      Rate and Rhythm: Normal rate and regular rhythm. Pulses: Normal pulses. Heart sounds: Normal heart sounds. Pulmonary:      Effort: Pulmonary effort is normal.      Breath sounds: Normal breath sounds. Abdominal:      General: Abdomen is flat. Bowel sounds are normal. There is no distension. Palpations: Abdomen is soft. There is no mass. Tenderness: There is no abdominal tenderness. There is no right CVA tenderness, left CVA tenderness or guarding. Hernia: No hernia is present. Musculoskeletal:      Cervical back: Normal range of motion. Left hip: Tenderness and bony tenderness present. Decreased range of motion. Comments: Internally rotated adducted. Neurovascularly intact   Skin:     General: Skin is warm and dry. Capillary Refill: Capillary refill takes less than 2 seconds. Neurological:      General: No focal deficit present.       Mental Status: She is alert and oriented to person, place, and time. Mental status is at baseline. Psychiatric:         Mood and Affect: Mood normal.         Behavior: Behavior normal.         Thought Content: Thought content normal.          Procedures     Conscious Sedation Procedure Note    Indication: hip dislocation    Consent: I have discussed with the patient and/or the patient representative the indication, alternatives, and the possible risks and/or complications of the planned procedure and the anesthesia methods. The patient and/or patient representative appear to understand and agree to proceed. Physician Involvement: The attending physician was present and supervising this procedure. Pre-Sedation Documentation and Exam:  Time: 1600  I have personally completed a history, physical exam & review of systems for this patient (see notes). Vital signs have been reviewed (see flow sheet for vitals). I have reviewed the patient's history and review of systems. Vital Signs: Heart Rate- 100, Respirations-27 , Blood Pressure-161/90 , Pulse Ox-94 %. Lungs: clear to auscultation bilaterally, Cardiovascular: regular rate and rhythm, no murmurs rubs or gallops. Airway Assessment: Mallampati Class III - (soft palate & base of uvula are visible)    Prior History of Anesthesia Complications: none    ASA Classification: Class 2 - A normal healthy patient with mild systemic disease    Sedation/ Anesthesia Plan: intravenous sedation    Medications Used: ketamine intravenously    Monitoring and Safety: The patient was placed on a cardiac monitor and vital signs, pulse oximetry and level of consciousness were continuously evaluated throughout the procedure. The patient was closely monitored until recovery from the medications was complete and the patient had returned to baseline status. Respiratory therapy was on standby at all times during the procedure.     (The following sections must be completed)  Post-Sedation Vital Signs: Vital signs were reviewed and were stable after the procedure (see flow sheet for vitals)            Post-Sedation Exam:   Time: 6220. Lungs: clear and Cardiovascular: regular rate and rhythm, no murmurs rubs or gallops           Complications: none      PROCEDURE NOTE    11/24/21       Time:1204    JOINT  REDUCTION  PROCEDURE  Risks, benefits and alternatives (for applicable procedures below) described. Performed By: EM Attending Physician and EM Resident. Indication: Joint dislocation  Informed consent: Written consent obtained. The patient was counseled regarding the procedure in person, it's indications, risks, potential complications and alternatives and any questions were answered. Consent was obtained. .  Location:   left  hip  Procedure:  Anesthsetic/anesthsia was obtained using conscious sedation -SEE CONSCIOUS SEDATION NOTE FOR DETAILS. Attempted reduction was performed by traction and counter traction and was successful. Patient tolerated the procedure well. Post-reduction XR's:  were obtained and revealed satisfactory reduction. Orthopaedic Consultation:  No.       MDM      79 y.o. female presents with hip injury. States that she bent over to adjust her socks while she was sitting and felt her hip, patient has a history of dislocations, she had bilateral hip replacement surgery 15 years ago(with Dr. Stanford Gould) and has had a dislocation of the L hip on 01/2021, which was successfully reduced in the ED. While in the ED patient was hemodynamically stable, nontoxic-appearing, in no respiratory distress. Physical exam remarkable for internal rotation and abduction of the left leg with increase in length. Patient has severe tenderness of her hip but neurovascularly intact. Initial x-ray of the hip remarkable for left hip arthroplasty dislocation with the femoral head component displaced superiorly.   Patient was consciously sedated with ketamine and reduced in the ED successfully with no complications. She was observed and reevaluated several times during her stay and remained hemodynamically stable with controlled pain and unchanged neurovascular exam.  She received Zofran and fluids. Is comfortable going home and will follow up outpatient with Dr. Alondra Duff. She understands to return to the ED in case of worsening symptoms. ED Course as of 11/24/21 1418   Wed Nov 24, 2021   1007   ATTENDING PROVIDER ATTESTATION:     I have personally performed and/or participated in the history, exam, medical decision making, and procedures and agree with all pertinent clinical information unless otherwise noted. I have also reviewed and agree with the past medical, family and social history unless otherwise noted. I have discussed this patient in detail with the resident and provided the instruction and education regarding the evidence-based evaluation and treatment of left hip discomfort/dislocation. History: Patient presents to the ED with a suspected dislocation of her left hip. Patient states that she was bending over to  something and she felt her hip pop. She has had prior dislocations of this hip before. She denies any pain at this time. Did not hit her head. Denies neck or back pain. My findings: Low Prince is a 79 y.o. female whom is in no distress. Physical exam reveals with a shortened and internally rotated left lower extremity. Pulses are intact. 2+ DP and PT pulse. Sensation grossly intact in lower extremities. My plan: Symptomatic and supportive care. [facility] imaging. Will attempt reduction if there is positive dislocation. Electronically signed by Deisi Irene DO on 11/24/21 at 10:07 AM EST       [MS]   4025 Patient tolerated procedure well. She is resting comfortably in bed. Knee immobilizer in place.   Hemodynamically stable [TC]   1241 Patient feels significantly better, neurovascularly intact, wiggling her toes states that she feels better and is able to move her neck now, denying any pain. States that she feels a little nauseous. Zofran ordered. [TC]   1349 Patient feels significantly better, her toes are neurovascularly intact,Hemodynamically stable, no longer nauseous. She feels comfortable going home. [facility] waiting room waiting to take her home. She will follow up with Ortho outpatient. [TC]      ED Course User Index  [MS] Leila Madrigal DO  [TC] Yasmany Schroeder MD       --------------------------------------------- PAST HISTORY ---------------------------------------------  Past Medical History:  has a past medical history of Arthritis, Cancer ([facility] [address] Hyperlipidemia, Hypertension, Osteoarthrosis, Osteoporosis, Other chronic postoperative pain, and Undiagnosed cardiac murmurs. Past Surgical History:  has a past surgical history that includes fracture surgery; Breast surgery; Tonsillectomy; Appendectomy; Colonoscopy; eye surgery; Total hip arthroplasty (Right, Oct 2013); Total knee arthroplasty (Left, 3/8/16); Hysterectomy (1979); Nerve Block (Left, 03/19/2019); epidural steroid injection (Left, 3/19/2019); Nerve Block (Left, 04/30/2019); Anesthesia Nerve Block (Left, 4/30/2019); joint replacement; joint replacement (10/2013); joint replacement (03/08/2016); joint replacement (11/03/2020); and Total knee arthroplasty (Right, 11/3/2020). Social History:  reports that she quit smoking about 23 years ago. She has a 0.75 pack-year smoking history. She has never used smokeless tobacco. She reports current alcohol use. She reports that she does not use drugs. Family History: family history includes Heart Disease in her mother. The patients home medications have been reviewed. Allergies: Patient has no known allergies. -------------------------------------------------- RESULTS -------------------------------------------------  Labs:  No results found for this visit on 11/24/21.     Radiology:  XR HIP LEFT (2-3 VIEWS)   Final Result   Satisfactory alignment status post reduction. XR HIP LEFT (2-3 VIEWS)   Final Result   Left hip arthroplasty dislocation as noted. ------------------------- NURSING NOTES AND VITALS REVIEWED ---------------------------  Date / Time Roomed:  11/24/2021  9:54 AM  ED Bed Assignment:  06/06    The nursing notes within the ED encounter and vital signs as below have been reviewed. /70   Pulse 85   Temp 97.7 °F (36.5 °C) (Oral)   Resp 20   Ht 5' 8\" (1.727 m)   Wt 171 lb (77.6 kg)   SpO2 97%   BMI 26.00 kg/m²   Oxygen Saturation Interpretation: Normal      ------------------------------------------ PROGRESS NOTES ------------------------------------------  12:11 PM EST  I have spoken with the patient and discussed todays results, in addition to providing specific details for the plan of care and counseling regarding the diagnosis and prognosis. Their questions are answered at this time and they are agreeable with the plan. I discussed at length with them reasons for immediate return here for re evaluation. They will followup with their primary care physician and orthopedic physician by calling their office tomorrow. --------------------------------- ADDITIONAL PROVIDER NOTES ---------------------------------  At this time the patient is without objective evidence of an acute process requiring hospitalization or inpatient management. They have remained hemodynamically stable throughout their entire ED visit and are stable for discharge with outpatient follow-up. The plan has been discussed in detail and they are aware of the specific conditions for emergent return, as well as the importance of follow-up. New Prescriptions    No medications on file       Diagnosis:  1. Closed dislocation of left hip, initial encounter [facility])        Disposition:  Patient's disposition: Discharge to home  Patient's condition is stable.        Emilia Jean MD  Resident  11/24/21 [id number] [id number]

## 2021-12-03 ENCOUNTER — OFFICE VISIT (OUTPATIENT)
Dept: ORTHOPEDIC SURGERY | Age: 81
End: 2021-12-03
Payer: MEDICARE

## 2021-12-03 VITALS — HEIGHT: 68 IN | BODY MASS INDEX: 25.91 KG/M2 | WEIGHT: 171 LBS | TEMPERATURE: 98 F

## 2021-12-03 DIAGNOSIS — Z98.890 S/P CLOSED REDUCTION OF DISLOCATED TOTAL HIP PROSTHESIS: Primary | ICD-10-CM

## 2021-12-03 PROCEDURE — G8417 CALC BMI ABV UP PARAM F/U: HCPCS | Performed by: ORTHOPAEDIC SURGERY

## 2021-12-03 PROCEDURE — 4040F PNEUMOC VAC/ADMIN/RCVD: CPT | Performed by: ORTHOPAEDIC SURGERY

## 2021-12-03 PROCEDURE — 99213 OFFICE O/P EST LOW 20 MIN: CPT | Performed by: ORTHOPAEDIC SURGERY

## 2021-12-03 PROCEDURE — G8484 FLU IMMUNIZE NO ADMIN: HCPCS | Performed by: ORTHOPAEDIC SURGERY

## 2021-12-03 PROCEDURE — G8399 PT W/DXA RESULTS DOCUMENT: HCPCS | Performed by: ORTHOPAEDIC SURGERY

## 2021-12-03 PROCEDURE — 1090F PRES/ABSN URINE INCON ASSESS: CPT | Performed by: ORTHOPAEDIC SURGERY

## 2021-12-03 PROCEDURE — 1123F ACP DISCUSS/DSCN MKR DOCD: CPT | Performed by: ORTHOPAEDIC SURGERY

## 2021-12-03 PROCEDURE — 1036F TOBACCO NON-USER: CPT | Performed by: ORTHOPAEDIC SURGERY

## 2021-12-03 PROCEDURE — G8427 DOCREV CUR MEDS BY ELIG CLIN: HCPCS | Performed by: ORTHOPAEDIC SURGERY

## 2021-12-03 NOTE — PROGRESS NOTES
Chief Complaint:   Chief Complaint   Patient presents with    Hip Pain     Left Hip F/U, Dislocation DOI 11/24/2021       Pat More follows up 9 days post dislocation of left total hip followed by reduction in emergency room. Maria Eugenia Howard been leaning down to put her socks on and her hip popped out. Is quite painful afterwards. She went to [facility] emergency room where she she was evaluated including x-ray showing the dislocation of the hip which is not her first time unfortunately. This was reduced with conscious sedation by ER personnel with a good reduction noted on follow-up films. Brittaney Angel Luis now would like to get something done to make this not happen again and was asking about revising her hip. Allergies; medications; past medical, surgical, family, and social history; and problem list have been reviewed today and updated as indicated in this encounter seen below. Exam: There is minimal tenderness in the upper left thigh. Range of motion is fair without excessive range tested because of her known instability. The knee immobilizer is in place. Radiographs: Imaging was reviewed showing the dislocation of the left hip and subsequent reduction. There is question of wear in the acetabular bearing. This arthroplasty was done by a number of years ago. ASSESSMENT:    Maria Eugenia Howard was seen today for hip pain. Diagnoses and all orders for this visit:    S/P closed reduction of dislocated total hip prosthesis        PLAN: I will research Ivory's old chart to find out when the index procedure was done and to review the exact components for surgical planning which would be a captured bearing revision of the articular surfaces. Return in about 3 weeks (around 12/24/2021).        Current Outpatient Medications   Medication Sig Dispense Refill    atorvastatin (LIPITOR) 40 MG tablet Take 1 tablet by mouth daily Mon,wed,fri 90 tablet 1    amLODIPine-benazepril (LOTREL) 2.5-10 MG per capsule Take 1 capsule by mouth daily 90 capsule 1    amLODIPine (NORVASC) 5 MG tablet Take 1 tablet by mouth daily 90 tablet 1    vitamin D (CHOLECALCIFEROL) 1000 UNITS TABS tablet Take 2,000 Units by mouth daily      Red Yeast Rice 600 MG CAPS Take 2 capsules by mouth three times a week      Multiple Vitamins-Minerals (CENTRUM SILVER ADULT 50+ PO) Take  by mouth daily.  calcium-vitamin D (OSCAL 500/200 D-3) 500-200 MG-UNIT per tablet Take 1 tablet by mouth 2 times daily.  NONFORMULARY daily. Tunde Red      aspirin 325 MG EC tablet Take 1 tablet by mouth 2 times daily for 28 days 56 tablet 0     No current facility-administered medications for this visit. Patient Active Problem List   Diagnosis    Degenerative arthritis of hip R    Primary osteoarthritis of left knee    Essential hypertension    Familial hypercholesterolemia    Osteoporosis    Spinal stenosis of lumbar region    Lumbar radiculopathy    DDD (degenerative disc disease), lumbar    Synovial cyst of lumbar facet joint    Arthritis of right knee       Past Medical History:   Diagnosis Date    Arthritis     right hip    Cancer ([address]     breast 1997 approx. left    Hyperlipidemia     Hypertension     Osteoarthrosis     Osteoporosis     Other chronic postoperative pain     Undiagnosed cardiac murmurs        Past Surgical History:   Procedure Laterality Date    ANESTHESIA NERVE BLOCK Left 4/30/2019    LUMBAR EPIDURAL STERIOD INJECTION L4-5  (LEFT PARAMEDIAN APPROACH) performed by Jose Deluna MD at [facility]      biopsied on left breast    COLONOSCOPY      EPIDURAL STEROID INJECTION Left 3/19/2019    LEFT LUMBAR TRANSFORAMINAL EPIDURAL STEROID INJECTION: L4 / L5 performed by Jose Deluna MD at [facility]      cataracts OU    FRACTURE SURGERY      right ankle    HYSTERECTOMY  1979    JOINT REPLACEMENT      L hip     JOINT REPLACEMENT  10/2013    R hip  JOINT REPLACEMENT  2016    L knee     JOINT REPLACEMENT  2020    R knee     NERVE BLOCK Left 2019    left lumbar transforaminal epidural    NERVE BLOCK Left 2019    lumbar    TONSILLECTOMY      TOTAL HIP ARTHROPLASTY Right Oct 2013    with cell saver    TOTAL KNEE ARTHROPLASTY Left 3/8/16    TOTAL KNEE ARTHROPLASTY Right 11/3/2020    TOTAL RIGHT KNEE REPLACEMENT/ ARTHROPLASTY (BERNABE) performed by Monse Escamilla DO at [address]       No Known Allergies    Social History     Socioeconomic History    Marital status:      Spouse name: None    Number of children: None    Years of education: None    Highest education level: None   Occupational History    None   Tobacco Use    Smoking status: Former Smoker     Packs/day: 0.25     Years: 3.00     Pack years: 0.75     Quit date: 9/10/1998     Years since quittin.2    Smokeless tobacco: Never Used    Tobacco comment: quit smoking approx 20 yrs ago   Substance and Sexual Activity    Alcohol use: Yes     Comment: occasionally    Drug use: No    Sexual activity: None   Other Topics Concern    None   Social History Narrative    None     Social Determinants of Health     Financial Resource Strain: Low Risk     Difficulty of Paying Living Expenses: Not hard at all   Food Insecurity: No Food Insecurity    Worried About Running Out of Food in the Last Year: Never true    Dot of Food in the Last Year: Never true   Transportation Needs:     Lack of Transportation (Medical): Not on file    Lack of Transportation (Non-Medical):  Not on file   Physical Activity:     Days of Exercise per Week: Not on file    Minutes of Exercise per Session: Not on file   Stress:     Feeling of Stress : Not on file   Social Connections:     Frequency of Communication with Friends and Family: Not on file    Frequency of Social Gatherings with Friends and Family: Not on file    Attends Faith Services: Not on file   [facility] Active Member of Clubs or Organizations: Not on file    Attends Club or Organization Meetings: Not on file    Marital Status: Not on file   Intimate Partner Violence:     Fear of Current or Ex-Partner: Not on file    Emotionally Abused: Not on file    Physically Abused: Not on file    Sexually Abused: Not on file   Housing Stability:     Unable to Pay for Housing in the Last Year: Not on file    Number of [place] in the Last Year: Not on file    Unstable Housing in the Last Year: Not on file       Review of Systems  As follows except as previously noted in HPI:  Constitutional: Negative for chills, diaphoresis, fatigue, fever and unexpected weight change. Respiratory: Negative for cough, shortness of breath and wheezing. Cardiovascular: Negative for chest pain and palpitations. Neurological: Negative for dizziness, syncope, cephalgia. GI / : negative  Musculoskeletal: see HPI       Objective:   Physical Exam   Constitutional: Oriented to person, place, and time. and appears well-developed and well-nourished. :   Head: Normocephalic and atraumatic. Eyes: EOM are normal.   Neck: Neck supple. Cardiovascular: Normal rate and regular rhythm. Pulmonary/Chest: Effort normal. No stridor. No respiratory distress, no wheezes. Abdominal:  No abnormal distension. Neurological: Alert and oriented to person, place, and time. Skin: Skin is warm and dry. Psychiatric: Normal mood and affect.  Behavior is normal. Thought content normal.    CHI Martell, DO    12/3/21  12:31 PM

## 2022-01-03 ENCOUNTER — TELEPHONE (OUTPATIENT)
Dept: ORTHOPEDIC SURGERY | Age: 82
End: 2022-01-03

## 2022-01-03 NOTE — TELEPHONE ENCOUNTER
Prior Authorization Form:      DEMOGRAPHICS:                     Patient Name:  Melina Garcia  Patient :  1940            Insurance:  Payor: Kamila Saver / Plan: [facility] HMO / Product Type: *No Product type* /   Insurance ID Number:    Payor/Plan Subscr  Sex Relation Sub. Ins. ID Effective Group Num   1.  [id number] 1940 Female Self [id number]                                   [address] [address]         DIAGNOSIS & PROCEDURE:                       Procedure/Operation: REVISION OF TOTAL LEFT HIP REPLACEMENT           CPT Code: 04389    Diagnosis:  S/P CLOSED REDUCTION OF DISLOCATED TOTAL HIP PROSTHESIS    ICD10 Code: Z98.890    Location:  [address] [place] [address]    Surgeon:  Sherly Healy D.O.    SCHEDULING INFORMATION:                          Date: 2022    Time: TBA              Anesthesia:  Spinal                                                       Status:  Outpatient        Special Comments:  Jovita Molina       Electronically signed by Taina Villarreal on 1/3/2022 at 9:43 AM

## 2022-01-13 ENCOUNTER — HOSPITAL ENCOUNTER (OUTPATIENT)
Age: 82
Discharge: HOME | End: 2022-01-13
Payer: MEDICARE

## 2022-01-13 LAB
EKG ATRIAL RATE: 83 BPM
EKG P AXIS: 71 DEGREES
EKG P-R INTERVAL: 154 MS
EKG Q-T INTERVAL: 370 MS
EKG QRS DURATION: 82 MS
EKG QTC CALCULATION (BAZETT): 434 MS
EKG R AXIS: 12 DEGREES
EKG T AXIS: 45 DEGREES
EKG VENTRICULAR RATE: 83 BPM

## 2022-01-13 PROCEDURE — 93005 ELECTROCARDIOGRAM TRACING: CPT | Performed by: INTERNAL MEDICINE

## 2022-01-18 RX ORDER — ASPIRIN 81 MG/1
81 TABLET ORAL DAILY
Status: ON HOLD | COMMUNITY
End: 2022-03-31 | Stop reason: HOSPADM

## 2022-01-18 RX ORDER — SODIUM CHLORIDE, SODIUM LACTATE, POTASSIUM CHLORIDE, CALCIUM CHLORIDE 600; 310; 30; 20 MG/100ML; MG/100ML; MG/100ML; MG/100ML
INJECTION, SOLUTION INTRAVENOUS CONTINUOUS
Status: CANCELLED | OUTPATIENT
Start: 2022-01-25

## 2022-01-19 ENCOUNTER — HOSPITAL ENCOUNTER (OUTPATIENT)
Dept: PREADMISSION TESTING | Age: 82
Discharge: HOME | End: 2022-01-19
Payer: MEDICARE

## 2022-01-19 ENCOUNTER — HOSPITAL ENCOUNTER (OUTPATIENT)
Dept: GENERAL RADIOLOGY | Age: 82
Discharge: HOME | End: 2022-01-21
Payer: MEDICARE

## 2022-01-19 VITALS
SYSTOLIC BLOOD PRESSURE: 138 MMHG | BODY MASS INDEX: 26.07 KG/M2 | TEMPERATURE: 97.6 F | RESPIRATION RATE: 18 BRPM | DIASTOLIC BLOOD PRESSURE: 64 MMHG | HEIGHT: 68 IN | WEIGHT: 172 LBS | OXYGEN SATURATION: 94 % | HEART RATE: 90 BPM

## 2022-01-19 DIAGNOSIS — M16.12 OSTEOARTHRITIS OF LEFT HIP, UNSPECIFIED OSTEOARTHRITIS TYPE: ICD-10-CM

## 2022-01-19 DIAGNOSIS — M16.11 OSTEOARTHRITIS OF RIGHT HIP, UNSPECIFIED OSTEOARTHRITIS TYPE: Primary | ICD-10-CM

## 2022-01-19 LAB
ANION GAP SERPL CALCULATED.3IONS-SCNC: 11 MMOL/L (ref 7–16)
APTT: 30 SEC (ref 24.5–35.1)
BASOPHILS ABSOLUTE: 0.04 E9/L (ref 0–0.2)
BASOPHILS RELATIVE PERCENT: 0.5 % (ref 0–2)
BUN BLDV-MCNC: 22 MG/DL (ref 6–23)
CALCIUM SERPL-MCNC: 10.6 MG/DL (ref 8.6–10.2)
CHLORIDE BLD-SCNC: 99 MMOL/L (ref 98–107)
CO2: 29 MMOL/L (ref 22–29)
CREAT SERPL-MCNC: 0.8 MG/DL (ref 0.5–1)
EOSINOPHILS ABSOLUTE: 0.16 E9/L (ref 0.05–0.5)
EOSINOPHILS RELATIVE PERCENT: 2.1 % (ref 0–6)
GFR AFRICAN AMERICAN: >60
GFR NON-AFRICAN AMERICAN: >60 ML/MIN/1.73
GLUCOSE BLD-MCNC: 100 MG/DL (ref 74–99)
HBA1C MFR BLD: 5.9 % (ref 4–5.6)
HCT VFR BLD CALC: 44 % (ref 34–48)
HEMOGLOBIN: 14.3 G/DL (ref 11.5–15.5)
IMMATURE GRANULOCYTES #: 0.03 E9/L
IMMATURE GRANULOCYTES %: 0.4 % (ref 0–5)
INR BLD: 1
LYMPHOCYTES ABSOLUTE: 2.12 E9/L (ref 1.5–4)
LYMPHOCYTES RELATIVE PERCENT: 27.7 % (ref 20–42)
MCH RBC QN AUTO: 30 PG (ref 26–35)
MCHC RBC AUTO-ENTMCNC: 32.5 % (ref 32–34.5)
MCV RBC AUTO: 92.4 FL (ref 80–99.9)
MONOCYTES ABSOLUTE: 0.57 E9/L (ref 0.1–0.95)
MONOCYTES RELATIVE PERCENT: 7.5 % (ref 2–12)
NEUTROPHILS ABSOLUTE: 4.72 E9/L (ref 1.8–7.3)
NEUTROPHILS RELATIVE PERCENT: 61.8 % (ref 43–80)
PDW BLD-RTO: 12.6 FL (ref 11.5–15)
PLATELET # BLD: 224 E9/L (ref 130–450)
PMV BLD AUTO: 10.8 FL (ref 7–12)
POTASSIUM REFLEX MAGNESIUM: 4.6 MMOL/L (ref 3.5–5)
PREALBUMIN: 27 MG/DL (ref 20–40)
PROTHROMBIN TIME: 12 SEC (ref 9.3–12.4)
RBC # BLD: 4.76 E12/L (ref 3.5–5.5)
SODIUM BLD-SCNC: 139 MMOL/L (ref 132–146)
WBC # BLD: 7.6 E9/L (ref 4.5–11.5)

## 2022-01-19 PROCEDURE — 71046 X-RAY EXAM CHEST 2 VIEWS: CPT

## 2022-01-19 PROCEDURE — 84134 ASSAY OF PREALBUMIN: CPT

## 2022-01-19 PROCEDURE — 80048 BASIC METABOLIC PNL TOTAL CA: CPT

## 2022-01-19 PROCEDURE — 83036 HEMOGLOBIN GLYCOSYLATED A1C: CPT

## 2022-01-19 PROCEDURE — 85025 COMPLETE CBC W/AUTO DIFF WBC: CPT

## 2022-01-19 PROCEDURE — 85610 PROTHROMBIN TIME: CPT

## 2022-01-19 PROCEDURE — 87081 CULTURE SCREEN ONLY: CPT

## 2022-01-19 PROCEDURE — 36415 COLL VENOUS BLD VENIPUNCTURE: CPT

## 2022-01-19 PROCEDURE — 85730 THROMBOPLASTIN TIME PARTIAL: CPT

## 2022-01-19 NOTE — PROGRESS NOTES
[address] [facility]                                                                                                                    PRE OP INSTRUCTIONS FOR  Toby Smallwood        Date: 1/19/2022    Date of surgery: 1/25/22   Arrival Time: Hospital will call you between 5pm and 7pm with your final arrival time for surgery    1. Do not eat or drink anything after midnight prior to surgery. This includes no water, chewing gum, mints or ice chips. 2. Take the following medications with a small sip of water on the morning of Surgery: none     3. Diabetics may take evening dose of insulin but none after midnight. If you feel symptomatic or low blood sugar morning of surgery drink 1-2 ounces of apple juice only. 4. Aspirin, Ibuprofen, Advil, Naproxen, Vitamin E and other Anti-inflammatory products should be stopped  before surgery  as directed by your physician. Take Tylenol only unless instructed otherwise by your surgeon. 5. Check with your Doctor regarding stopping Plavix, Coumadin, Lovenox, Eliquis, Effient, or other blood thinners. 6. Do not smoke,use illicit drugs and do not drink any alcoholic beverages 24 hours prior to surgery. 7. You may brush your teeth the morning of surgery. DO NOT SWALLOW WATER    8. You MUST make arrangements for a responsible adult to take you home after your surgery. You will not be allowed to leave alone or drive yourself home. It is strongly suggested someone stay with you the first 24 hrs. Your surgery will be cancelled if you do not have a ride home. 9. Please wear simple, loose fitting clothing to the hospital.  Darron Edwina not bring valuables (money, credit cards, checkbooks, etc.) Do not wear any makeup (including no eye makeup) or nail polish on your fingers or toes. 10. DO NOT wear any jewelry or piercings on day of surgery. All body piercing jewelry must be removed. 11.  Shower the night before surgery with ___Antibacterial soap Kim Rung WIPES___x_____      12. If you have a Living Will and Durable Power of  for Healthcare, please bring in a copy. 13. If appropriate bring crutches, inspirex, WALKER, CANE etc...    15. Notify your Surgeon if you develop any illness between now and surgery time, cough, cold, fever, sore throat, nausea, vomiting, etc.  Please notify your surgeon if you experience dizziness, shortness of breath or blurred vision between now & the time of your surgery. 15. If you have ___dentures, they will be removed before going to the OR; we will provide you a container. If you wear ___contact lenses or ___glasses, they will be removed; please bring a case for them. 16. To provide excellent care visitors will be limited to 1 in the room at any given time                                                                                         17. During flu season no children under the age of 15 are permitted in the hospital for the safety of all patients. 18. Other                  Please call AMBULATORY CARE if you have any further questions.    [address]     [address]

## 2022-01-19 NOTE — PROGRESS NOTES
Hitesh at Dr. Bridgette Palma office notified that pt is unable to give a urine sample after 2 hours of trying with plenty of water given. Pt denies any symptoms of UTI- burning, fever, frequency/urgency. I explained to pt we needed specimen to screen for a UTI.

## 2022-01-21 LAB — MRSA CULTURE ONLY: NORMAL

## 2022-01-22 ENCOUNTER — HOSPITAL ENCOUNTER (OUTPATIENT)
Dept: CT IMAGING | Age: 82
Discharge: HOME | End: 2022-01-22
Payer: MEDICARE

## 2022-01-22 DIAGNOSIS — R93.89 ABNORMAL CXR: ICD-10-CM

## 2022-01-22 DIAGNOSIS — Z01.818 PREOP EXAMINATION: ICD-10-CM

## 2022-01-22 PROCEDURE — 71250 CT THORAX DX C-: CPT

## 2022-01-31 ENCOUNTER — HOSPITAL ENCOUNTER (OUTPATIENT)
Age: 82
Discharge: HOME | End: 2022-02-02
Payer: MEDICARE

## 2022-01-31 ENCOUNTER — HOSPITAL ENCOUNTER (OUTPATIENT)
Dept: GENERAL RADIOLOGY | Age: 82
Discharge: HOME | End: 2022-02-02
Payer: MEDICARE

## 2022-01-31 DIAGNOSIS — J18.9 COMMUNITY ACQUIRED PNEUMONIA OF LEFT LUNG, UNSPECIFIED PART OF LUNG: ICD-10-CM

## 2022-01-31 PROCEDURE — 71046 X-RAY EXAM CHEST 2 VIEWS: CPT

## 2022-02-17 ENCOUNTER — HOSPITAL ENCOUNTER (OUTPATIENT)
Dept: CT IMAGING | Age: 82
Discharge: HOME | End: 2022-02-17
Payer: MEDICARE

## 2022-02-17 DIAGNOSIS — J47.1 BRONCHIECTASIS WITH ACUTE EXACERBATION (HCC): ICD-10-CM

## 2022-02-17 PROCEDURE — 71250 CT THORAX DX C-: CPT

## 2022-03-16 ENCOUNTER — HOSPITAL ENCOUNTER (OUTPATIENT)
Age: 82
Discharge: HOME | End: 2022-03-16
Payer: MEDICARE

## 2022-03-16 ENCOUNTER — TELEPHONE (OUTPATIENT)
Dept: ORTHOPEDIC SURGERY | Age: 82
End: 2022-03-16

## 2022-03-16 DIAGNOSIS — E03.9 PRIMARY HYPOTHYROIDISM: ICD-10-CM

## 2022-03-16 DIAGNOSIS — Z00.00 WELL ADULT EXAM: ICD-10-CM

## 2022-03-16 DIAGNOSIS — E78.01 FAMILIAL HYPERCHOLESTEROLEMIA: ICD-10-CM

## 2022-03-16 DIAGNOSIS — R73.9 HYPERGLYCEMIA: ICD-10-CM

## 2022-03-16 DIAGNOSIS — D50.0 IRON DEFICIENCY ANEMIA SECONDARY TO BLOOD LOSS (CHRONIC): ICD-10-CM

## 2022-03-16 DIAGNOSIS — E55.9 VITAMIN D DEFICIENCY: ICD-10-CM

## 2022-03-16 LAB
ALBUMIN SERPL-MCNC: 4.2 G/DL (ref 3.5–5.2)
ALP BLD-CCNC: 49 U/L (ref 35–104)
ALT SERPL-CCNC: 20 U/L (ref 0–32)
ANION GAP SERPL CALCULATED.3IONS-SCNC: 10 MMOL/L (ref 7–16)
AST SERPL-CCNC: 19 U/L (ref 0–31)
BILIRUB SERPL-MCNC: 0.4 MG/DL (ref 0–1.2)
BUN BLDV-MCNC: 23 MG/DL (ref 6–23)
CALCIUM SERPL-MCNC: 9.8 MG/DL (ref 8.6–10.2)
CHLORIDE BLD-SCNC: 104 MMOL/L (ref 98–107)
CHOLESTEROL, TOTAL: 160 MG/DL (ref 0–199)
CO2: 27 MMOL/L (ref 22–29)
CREAT SERPL-MCNC: 0.8 MG/DL (ref 0.5–1)
GFR AFRICAN AMERICAN: >60
GFR NON-AFRICAN AMERICAN: >60 ML/MIN/1.73
GLUCOSE BLD-MCNC: 113 MG/DL (ref 74–99)
HBA1C MFR BLD: 6 % (ref 4–5.6)
HCT VFR BLD CALC: 42.1 % (ref 34–48)
HDLC SERPL-MCNC: 64 MG/DL
HEMOGLOBIN: 13.5 G/DL (ref 11.5–15.5)
LDL CHOLESTEROL CALCULATED: 53 MG/DL (ref 0–99)
MCH RBC QN AUTO: 30.2 PG (ref 26–35)
MCHC RBC AUTO-ENTMCNC: 32.1 % (ref 32–34.5)
MCV RBC AUTO: 94.2 FL (ref 80–99.9)
PDW BLD-RTO: 13.4 FL (ref 11.5–15)
PLATELET # BLD: 187 E9/L (ref 130–450)
PMV BLD AUTO: 10.7 FL (ref 7–12)
POTASSIUM SERPL-SCNC: 4.6 MMOL/L (ref 3.5–5)
RBC # BLD: 4.47 E12/L (ref 3.5–5.5)
SODIUM BLD-SCNC: 141 MMOL/L (ref 132–146)
TOTAL PROTEIN: 7.1 G/DL (ref 6.4–8.3)
TRIGL SERPL-MCNC: 216 MG/DL (ref 0–149)
TSH SERPL DL<=0.05 MIU/L-ACNC: 0.75 UIU/ML (ref 0.27–4.2)
VITAMIN D 25-HYDROXY: 83 NG/ML (ref 30–100)
VLDLC SERPL CALC-MCNC: 43 MG/DL
WBC # BLD: 6.4 E9/L (ref 4.5–11.5)

## 2022-03-16 PROCEDURE — 85027 COMPLETE CBC AUTOMATED: CPT

## 2022-03-16 PROCEDURE — 36415 COLL VENOUS BLD VENIPUNCTURE: CPT

## 2022-03-16 PROCEDURE — 82306 VITAMIN D 25 HYDROXY: CPT

## 2022-03-16 PROCEDURE — 83036 HEMOGLOBIN GLYCOSYLATED A1C: CPT

## 2022-03-16 PROCEDURE — 80061 LIPID PANEL: CPT

## 2022-03-16 PROCEDURE — 80053 COMPREHEN METABOLIC PANEL: CPT

## 2022-03-16 PROCEDURE — 84443 ASSAY THYROID STIM HORMONE: CPT

## 2022-03-16 NOTE — TELEPHONE ENCOUNTER
Prior Authorization Form:      DEMOGRAPHICS:                     Patient Name:  Jennifer Prather  Patient :  1940            Insurance:  Payor: Bernie Castor / [place] / Product Type: *No Product type* /   Insurance ID Number:    Payor/Plan Subscr  Sex Relation Sub. Ins. ID Effective Group Num   1.  [id number] Female Self [id number]                                   [address] [address]         DIAGNOSIS & PROCEDURE:                       Procedure/Operation: REVISION OF TOTAL LEFT HIP           CPT Code: 50936    Diagnosis:  S/P CLOSED REDUCTION OF DISLOCATED TOTAL LEFT HIP PROSTHESIS    ICD10 Code: A90.871    Location:  [facility]    Surgeon:  Virginia Alejandre    SCHEDULING INFORMATION:                          Date: 2022   Time: 1:00 PM             Anesthesia:  General                                                       Status:  Outpatient        Special Comments:  Armando Moseley       Electronically signed by Manav Carolina on 3/16/2022 at 1:21 PM

## 2022-03-22 ENCOUNTER — HOSPITAL ENCOUNTER (OUTPATIENT)
Dept: PREADMISSION TESTING | Age: 82
Discharge: HOME | End: 2022-03-22
Payer: MEDICARE

## 2022-03-22 VITALS
BODY MASS INDEX: 26.52 KG/M2 | HEIGHT: 68 IN | HEART RATE: 82 BPM | TEMPERATURE: 97.1 F | WEIGHT: 175 LBS | SYSTOLIC BLOOD PRESSURE: 120 MMHG | OXYGEN SATURATION: 96 % | DIASTOLIC BLOOD PRESSURE: 68 MMHG | RESPIRATION RATE: 18 BRPM

## 2022-03-22 DIAGNOSIS — Z01.818 PRE-OP TESTING: Primary | ICD-10-CM

## 2022-03-22 LAB
ALBUMIN SERPL-MCNC: 4.3 G/DL (ref 3.5–5.2)
ALP BLD-CCNC: 48 U/L (ref 35–104)
ALT SERPL-CCNC: 20 U/L (ref 0–32)
ANION GAP SERPL CALCULATED.3IONS-SCNC: 9 MMOL/L (ref 7–16)
APTT: 29.6 SEC (ref 24.5–35.1)
AST SERPL-CCNC: 18 U/L (ref 0–31)
BACTERIA: ABNORMAL /HPF
BASOPHILS ABSOLUTE: 0.05 E9/L (ref 0–0.2)
BASOPHILS RELATIVE PERCENT: 0.7 % (ref 0–2)
BILIRUB SERPL-MCNC: 0.5 MG/DL (ref 0–1.2)
BILIRUBIN URINE: NEGATIVE
BLOOD, URINE: ABNORMAL
BUN BLDV-MCNC: 33 MG/DL (ref 6–23)
CALCIUM SERPL-MCNC: 10.3 MG/DL (ref 8.6–10.2)
CHLORIDE BLD-SCNC: 102 MMOL/L (ref 98–107)
CLARITY: ABNORMAL
CO2: 28 MMOL/L (ref 22–29)
COLOR: YELLOW
CREAT SERPL-MCNC: 0.7 MG/DL (ref 0.5–1)
EOSINOPHILS ABSOLUTE: 0.19 E9/L (ref 0.05–0.5)
EOSINOPHILS RELATIVE PERCENT: 2.7 % (ref 0–6)
EPITHELIAL CELLS, UA: ABNORMAL /HPF
GFR AFRICAN AMERICAN: >60
GFR NON-AFRICAN AMERICAN: >60 ML/MIN/1.73
GLUCOSE BLD-MCNC: 102 MG/DL (ref 74–99)
GLUCOSE URINE: NEGATIVE MG/DL
HCT VFR BLD CALC: 43.9 % (ref 34–48)
HEMOGLOBIN: 14.1 G/DL (ref 11.5–15.5)
IMMATURE GRANULOCYTES #: 0.03 E9/L
IMMATURE GRANULOCYTES %: 0.4 % (ref 0–5)
INR BLD: 1
KETONES, URINE: NEGATIVE MG/DL
LEUKOCYTE ESTERASE, URINE: ABNORMAL
LYMPHOCYTES ABSOLUTE: 1.63 E9/L (ref 1.5–4)
LYMPHOCYTES RELATIVE PERCENT: 23.4 % (ref 20–42)
MCH RBC QN AUTO: 29.7 PG (ref 26–35)
MCHC RBC AUTO-ENTMCNC: 32.1 % (ref 32–34.5)
MCV RBC AUTO: 92.4 FL (ref 80–99.9)
MONOCYTES ABSOLUTE: 0.53 E9/L (ref 0.1–0.95)
MONOCYTES RELATIVE PERCENT: 7.6 % (ref 2–12)
NEUTROPHILS ABSOLUTE: 4.54 E9/L (ref 1.8–7.3)
NEUTROPHILS RELATIVE PERCENT: 65.2 % (ref 43–80)
NITRITE, URINE: POSITIVE
PDW BLD-RTO: 13.2 FL (ref 11.5–15)
PH UA: 6 (ref 5–9)
PLATELET # BLD: 204 E9/L (ref 130–450)
PMV BLD AUTO: 10.6 FL (ref 7–12)
POTASSIUM REFLEX MAGNESIUM: 4.4 MMOL/L (ref 3.5–5)
PREALBUMIN: 26 MG/DL (ref 20–40)
PROTEIN UA: NEGATIVE MG/DL
PROTHROMBIN TIME: 12 SEC (ref 9.3–12.4)
RBC # BLD: 4.75 E12/L (ref 3.5–5.5)
RBC UA: ABNORMAL /HPF (ref 0–2)
SODIUM BLD-SCNC: 139 MMOL/L (ref 132–146)
SPECIFIC GRAVITY UA: 1.02 (ref 1–1.03)
TOTAL PROTEIN: 6.9 G/DL (ref 6.4–8.3)
UROBILINOGEN, URINE: 0.2 E.U./DL
WBC # BLD: 7 E9/L (ref 4.5–11.5)
WBC UA: >20 /HPF (ref 0–5)

## 2022-03-22 PROCEDURE — 87077 CULTURE AEROBIC IDENTIFY: CPT

## 2022-03-22 PROCEDURE — 87088 URINE BACTERIA CULTURE: CPT

## 2022-03-22 PROCEDURE — 84134 ASSAY OF PREALBUMIN: CPT

## 2022-03-22 PROCEDURE — 85610 PROTHROMBIN TIME: CPT

## 2022-03-22 PROCEDURE — 87081 CULTURE SCREEN ONLY: CPT

## 2022-03-22 PROCEDURE — 36415 COLL VENOUS BLD VENIPUNCTURE: CPT

## 2022-03-22 PROCEDURE — 85025 COMPLETE CBC W/AUTO DIFF WBC: CPT

## 2022-03-22 PROCEDURE — 80053 COMPREHEN METABOLIC PANEL: CPT

## 2022-03-22 PROCEDURE — 87186 SC STD MICRODIL/AGAR DIL: CPT

## 2022-03-22 PROCEDURE — 85730 THROMBOPLASTIN TIME PARTIAL: CPT

## 2022-03-22 PROCEDURE — 81001 URINALYSIS AUTO W/SCOPE: CPT

## 2022-03-22 NOTE — PROGRESS NOTES
Urine results faxed to Dr Naveed Cartwright. Also called re Medical clearance.   Bam Infante RN  3/22/2022  11:12 AM

## 2022-03-22 NOTE — PROGRESS NOTES
[address] [facility]                                                                                                                    PRE OP INSTRUCTIONS FOR  Dinorah Price        Date: 3/22/2022    Date of surgery: 3/31/21   Arrival Time: Hospital will call you between 5pm and 7pm with your final arrival time for surgery    1. Do not eat or drink anything after midnight prior to surgery. This includes no water, chewing gum, mints or ice chips. 2. Take the following medications with a small sip of water on the morning of Surgery: amlodipine/benezapril      3. Aspirin, Ibuprofen, Advil, Naproxen, Vitamin E and other Anti-inflammatory products should be stopped  before surgery  as directed by your physician. Take Tylenol only unless instructed otherwise by your surgeon. 4. Do not smoke,use illicit drugs and do not drink any alcoholic beverages 24 hours prior to surgery. 5. You may brush your teeth the morning of surgery. DO NOT SWALLOW WATER    6. You MUST make arrangements for a responsible adult to take you home after your surgery. You will not be allowed to leave alone or drive yourself home. It is strongly suggested someone stay with you the first 24 hrs. Your surgery will be cancelled if you do not have a ride home. 7. Please wear simple, loose fitting clothing to the hospital.  Zeenat Osborne not bring valuables (money, credit cards, checkbooks, etc.) Do not wear any makeup (including no eye makeup) or nail polish on your fingers or toes. 8. DO NOT wear any jewelry or piercings on day of surgery. All body piercing jewelry must be removed. 9. Shower the night before surgery with _x__Antibacterial soap /HORTENSIA WIPES___x_____      10. If you have a Living Will and Durable Power of  for Healthcare, please bring in a copy.     11. If appropriate bring crutches, inspirex, WALKER, CANE etc...    12. Notify your Surgeon if you develop any illness between now and surgery time, cough, cold, fever, sore throat, nausea, vomiting, etc.  Please notify your surgeon if you experience dizziness, shortness of breath or blurred vision between now & the time of your surgery. 13. If you have ___dentures, they will be removed before going to the OR; we will provide you a container. If you wear ___contact lenses or _x__glasses, they will be removed; please bring a case for them. 14. To provide excellent care visitors will be limited to  1 in the room at any given time. 15. During flu season no children under the age of 15 are permitted in the hospital for the safety of all patients. 16. Other Come in main lobby and stop at                  Please call AMBULATORY CARE if you have any further questions.    [facility]     [address]

## 2022-03-23 LAB — MRSA CULTURE ONLY: NORMAL

## 2022-03-25 LAB
ORGANISM: ABNORMAL
URINE CULTURE, ROUTINE: ABNORMAL

## 2022-03-25 NOTE — PROGRESS NOTES
Urine c&s faxed and called to Dr Barahoan Person office. Also faxed to Dr Orly Shelby.   Ruperto Velez RN  3/25/2022  8:53 AM

## 2022-03-29 NOTE — PROGRESS NOTES
Per Patient Dr. Lamar Beckford ordered an antibiotic to be taken BID for five days for treatment of UTI. Patient forgot the name of medication but she finished it on 3-28-22 and threw away the bottle- patient states she feels UTI is resolved.

## 2022-03-31 ENCOUNTER — APPOINTMENT (OUTPATIENT)
Dept: GENERAL RADIOLOGY | Age: 82
DRG: 467 | End: 2022-03-31
Attending: ORTHOPAEDIC SURGERY
Payer: MEDICARE

## 2022-03-31 ENCOUNTER — ANESTHESIA (OUTPATIENT)
Dept: OPERATING ROOM | Age: 82
DRG: 467 | End: 2022-03-31
Payer: MEDICARE

## 2022-03-31 ENCOUNTER — HOSPITAL ENCOUNTER (INPATIENT)
Age: 82
LOS: 1 days | Discharge: HOME | DRG: 467 | End: 2022-04-01
Attending: ORTHOPAEDIC SURGERY | Admitting: ORTHOPAEDIC SURGERY
Payer: MEDICARE

## 2022-03-31 ENCOUNTER — ANESTHESIA EVENT (OUTPATIENT)
Dept: OPERATING ROOM | Age: 82
DRG: 467 | End: 2022-03-31
Payer: MEDICARE

## 2022-03-31 VITALS
TEMPERATURE: 95.4 F | OXYGEN SATURATION: 95 % | RESPIRATION RATE: 4 BRPM | DIASTOLIC BLOOD PRESSURE: 125 MMHG | SYSTOLIC BLOOD PRESSURE: 150 MMHG

## 2022-03-31 DIAGNOSIS — T84.028S FAILED TOTAL HIP ARTHROPLASTY WITH DISLOCATION, SEQUELA: Primary | ICD-10-CM

## 2022-03-31 DIAGNOSIS — Z98.890 HISTORY OF SURGERY: ICD-10-CM

## 2022-03-31 DIAGNOSIS — Z96.649 FAILED TOTAL HIP ARTHROPLASTY WITH DISLOCATION, SEQUELA: Primary | ICD-10-CM

## 2022-03-31 PROBLEM — M19.90 DJD (DEGENERATIVE JOINT DISEASE): Status: ACTIVE | Noted: 2022-03-31

## 2022-03-31 PROCEDURE — 2500000003 HC RX 250 WO HCPCS: Performed by: STUDENT IN AN ORGANIZED HEALTH CARE EDUCATION/TRAINING PROGRAM

## 2022-03-31 PROCEDURE — 6370000000 HC RX 637 (ALT 250 FOR IP): Performed by: STUDENT IN AN ORGANIZED HEALTH CARE EDUCATION/TRAINING PROGRAM

## 2022-03-31 PROCEDURE — 6360000002 HC RX W HCPCS

## 2022-03-31 PROCEDURE — C1776 JOINT DEVICE (IMPLANTABLE): HCPCS | Performed by: ORTHOPAEDIC SURGERY

## 2022-03-31 PROCEDURE — 0SUB09Z SUPPLEMENT LEFT HIP JOINT WITH LINER, OPEN APPROACH: ICD-10-PCS | Performed by: ORTHOPAEDIC SURGERY

## 2022-03-31 PROCEDURE — 2580000003 HC RX 258: Performed by: ORTHOPAEDIC SURGERY

## 2022-03-31 PROCEDURE — 27134 REVISE HIP JOINT REPLACEMENT: CPT | Performed by: ORTHOPAEDIC SURGERY

## 2022-03-31 PROCEDURE — 6370000000 HC RX 637 (ALT 250 FOR IP): Performed by: ORTHOPAEDIC SURGERY

## 2022-03-31 PROCEDURE — 6360000002 HC RX W HCPCS: Performed by: ORTHOPAEDIC SURGERY

## 2022-03-31 PROCEDURE — 2709999900 HC NON-CHARGEABLE SUPPLY: Performed by: ORTHOPAEDIC SURGERY

## 2022-03-31 PROCEDURE — 88304 TISSUE EXAM BY PATHOLOGIST: CPT

## 2022-03-31 PROCEDURE — 7100000001 HC PACU RECOVERY - ADDTL 15 MIN: Performed by: ORTHOPAEDIC SURGERY

## 2022-03-31 PROCEDURE — 2580000003 HC RX 258

## 2022-03-31 PROCEDURE — 97116 GAIT TRAINING THERAPY: CPT | Performed by: PHYSICAL THERAPIST

## 2022-03-31 PROCEDURE — 73502 X-RAY EXAM HIP UNI 2-3 VIEWS: CPT

## 2022-03-31 PROCEDURE — 0SPB09Z REMOVAL OF LINER FROM LEFT HIP JOINT, OPEN APPROACH: ICD-10-PCS | Performed by: ORTHOPAEDIC SURGERY

## 2022-03-31 PROCEDURE — 87116 MYCOBACTERIA CULTURE: CPT

## 2022-03-31 PROCEDURE — 2580000003 HC RX 258: Performed by: ANESTHESIOLOGY

## 2022-03-31 PROCEDURE — 3600000005 HC SURGERY LEVEL 5 BASE: Performed by: ORTHOPAEDIC SURGERY

## 2022-03-31 PROCEDURE — 3700000000 HC ANESTHESIA ATTENDED CARE: Performed by: ORTHOPAEDIC SURGERY

## 2022-03-31 PROCEDURE — 6360000002 HC RX W HCPCS: Performed by: STUDENT IN AN ORGANIZED HEALTH CARE EDUCATION/TRAINING PROGRAM

## 2022-03-31 PROCEDURE — 87070 CULTURE OTHR SPECIMN AEROBIC: CPT

## 2022-03-31 PROCEDURE — A4217 STERILE WATER/SALINE, 500 ML: HCPCS | Performed by: ORTHOPAEDIC SURGERY

## 2022-03-31 PROCEDURE — 1200000000 HC SEMI PRIVATE

## 2022-03-31 PROCEDURE — 87102 FUNGUS ISOLATION CULTURE: CPT

## 2022-03-31 PROCEDURE — 3700000001 HC ADD 15 MINUTES (ANESTHESIA): Performed by: ORTHOPAEDIC SURGERY

## 2022-03-31 PROCEDURE — 2580000003 HC RX 258: Performed by: STUDENT IN AN ORGANIZED HEALTH CARE EDUCATION/TRAINING PROGRAM

## 2022-03-31 PROCEDURE — 87206 SMEAR FLUORESCENT/ACID STAI: CPT

## 2022-03-31 PROCEDURE — 2500000003 HC RX 250 WO HCPCS: Performed by: ORTHOPAEDIC SURGERY

## 2022-03-31 PROCEDURE — 97161 PT EVAL LOW COMPLEX 20 MIN: CPT | Performed by: PHYSICAL THERAPIST

## 2022-03-31 PROCEDURE — 3600000015 HC SURGERY LEVEL 5 ADDTL 15MIN: Performed by: ORTHOPAEDIC SURGERY

## 2022-03-31 PROCEDURE — 7100000000 HC PACU RECOVERY - FIRST 15 MIN: Performed by: ORTHOPAEDIC SURGERY

## 2022-03-31 PROCEDURE — 88331 PATH CONSLTJ SURG 1 BLK 1SPC: CPT

## 2022-03-31 PROCEDURE — 87075 CULTR BACTERIA EXCEPT BLOOD: CPT

## 2022-03-31 PROCEDURE — 87015 SPECIMEN INFECT AGNT CONCNTJ: CPT

## 2022-03-31 PROCEDURE — 87205 SMEAR GRAM STAIN: CPT

## 2022-03-31 PROCEDURE — 97110 THERAPEUTIC EXERCISES: CPT | Performed by: PHYSICAL THERAPIST

## 2022-03-31 PROCEDURE — 2500000003 HC RX 250 WO HCPCS

## 2022-03-31 DEVICE — PINNACLE HIP SOLUTIONS GVF POLYETHYLENE CONSTRAINED ACETABULAR LINER +4 NEUTRAL 36MM ID 56MM OD
Type: IMPLANTABLE DEVICE | Site: HIP | Status: FUNCTIONAL
Brand: PINNACLE

## 2022-03-31 DEVICE — M-SPEC METAL FEMORAL HEAD 12/14 TAPER DIAMETER 36MM +8.5: Type: IMPLANTABLE DEVICE | Site: HIP | Status: FUNCTIONAL

## 2022-03-31 RX ORDER — ONDANSETRON 2 MG/ML
INJECTION INTRAMUSCULAR; INTRAVENOUS PRN
Status: DISCONTINUED | OUTPATIENT
Start: 2022-03-31 | End: 2022-03-31 | Stop reason: SDUPTHER

## 2022-03-31 RX ORDER — SODIUM CHLORIDE 0.9 % (FLUSH) 0.9 %
5-40 SYRINGE (ML) INJECTION EVERY 12 HOURS SCHEDULED
Status: DISCONTINUED | OUTPATIENT
Start: 2022-03-31 | End: 2022-04-01 | Stop reason: SDUPTHER

## 2022-03-31 RX ORDER — FENTANYL CITRATE 50 UG/ML
25 INJECTION, SOLUTION INTRAMUSCULAR; INTRAVENOUS EVERY 5 MIN PRN
Status: DISCONTINUED | OUTPATIENT
Start: 2022-03-31 | End: 2022-03-31 | Stop reason: HOSPADM

## 2022-03-31 RX ORDER — MORPHINE SULFATE 2 MG/ML
2 INJECTION, SOLUTION INTRAMUSCULAR; INTRAVENOUS EVERY 4 HOURS PRN
Status: DISCONTINUED | OUTPATIENT
Start: 2022-03-31 | End: 2022-04-01 | Stop reason: HOSPADM

## 2022-03-31 RX ORDER — SODIUM CHLORIDE, SODIUM LACTATE, POTASSIUM CHLORIDE, CALCIUM CHLORIDE 600; 310; 30; 20 MG/100ML; MG/100ML; MG/100ML; MG/100ML
INJECTION, SOLUTION INTRAVENOUS CONTINUOUS PRN
Status: DISCONTINUED | OUTPATIENT
Start: 2022-03-31 | End: 2022-03-31 | Stop reason: SDUPTHER

## 2022-03-31 RX ORDER — FENTANYL CITRATE 50 UG/ML
INJECTION, SOLUTION INTRAMUSCULAR; INTRAVENOUS
Status: COMPLETED
Start: 2022-03-31 | End: 2022-03-31

## 2022-03-31 RX ORDER — ATORVASTATIN CALCIUM 40 MG/1
40 TABLET, FILM COATED ORAL NIGHTLY
COMMUNITY

## 2022-03-31 RX ORDER — CELECOXIB 100 MG/1
100 CAPSULE ORAL ONCE
Status: COMPLETED | OUTPATIENT
Start: 2022-03-31 | End: 2022-03-31

## 2022-03-31 RX ORDER — MEPERIDINE HYDROCHLORIDE 25 MG/ML
12.5 INJECTION INTRAMUSCULAR; INTRAVENOUS; SUBCUTANEOUS EVERY 5 MIN PRN
Status: DISCONTINUED | OUTPATIENT
Start: 2022-03-31 | End: 2022-03-31 | Stop reason: HOSPADM

## 2022-03-31 RX ORDER — DEXAMETHASONE SODIUM PHOSPHATE 10 MG/ML
INJECTION, SOLUTION INTRAMUSCULAR; INTRAVENOUS PRN
Status: DISCONTINUED | OUTPATIENT
Start: 2022-03-31 | End: 2022-03-31 | Stop reason: SDUPTHER

## 2022-03-31 RX ORDER — SODIUM CHLORIDE 0.9 % (FLUSH) 0.9 %
5-40 SYRINGE (ML) INJECTION PRN
Status: DISCONTINUED | OUTPATIENT
Start: 2022-03-31 | End: 2022-03-31 | Stop reason: HOSPADM

## 2022-03-31 RX ORDER — ROCURONIUM BROMIDE 10 MG/ML
INJECTION, SOLUTION INTRAVENOUS PRN
Status: DISCONTINUED | OUTPATIENT
Start: 2022-03-31 | End: 2022-03-31 | Stop reason: SDUPTHER

## 2022-03-31 RX ORDER — PROPOFOL 10 MG/ML
INJECTION, EMULSION INTRAVENOUS PRN
Status: DISCONTINUED | OUTPATIENT
Start: 2022-03-31 | End: 2022-03-31 | Stop reason: SDUPTHER

## 2022-03-31 RX ORDER — NEOSTIGMINE METHYLSULFATE 1 MG/ML
INJECTION, SOLUTION INTRAVENOUS PRN
Status: DISCONTINUED | OUTPATIENT
Start: 2022-03-31 | End: 2022-03-31 | Stop reason: SDUPTHER

## 2022-03-31 RX ORDER — LABETALOL HYDROCHLORIDE 5 MG/ML
10 INJECTION, SOLUTION INTRAVENOUS
Status: DISCONTINUED | OUTPATIENT
Start: 2022-03-31 | End: 2022-03-31 | Stop reason: HOSPADM

## 2022-03-31 RX ORDER — SODIUM CHLORIDE 0.9 % (FLUSH) 0.9 %
5-40 SYRINGE (ML) INJECTION EVERY 12 HOURS SCHEDULED
Status: DISCONTINUED | OUTPATIENT
Start: 2022-03-31 | End: 2022-03-31 | Stop reason: HOSPADM

## 2022-03-31 RX ORDER — ONDANSETRON 2 MG/ML
4 INJECTION INTRAMUSCULAR; INTRAVENOUS
Status: DISCONTINUED | OUTPATIENT
Start: 2022-03-31 | End: 2022-03-31 | Stop reason: HOSPADM

## 2022-03-31 RX ORDER — MORPHINE SULFATE 2 MG/ML
2 INJECTION, SOLUTION INTRAMUSCULAR; INTRAVENOUS EVERY 5 MIN PRN
Status: DISCONTINUED | OUTPATIENT
Start: 2022-03-31 | End: 2022-03-31 | Stop reason: HOSPADM

## 2022-03-31 RX ORDER — SODIUM CHLORIDE 9 MG/ML
25 INJECTION, SOLUTION INTRAVENOUS PRN
Status: DISCONTINUED | OUTPATIENT
Start: 2022-03-31 | End: 2022-04-01 | Stop reason: HOSPADM

## 2022-03-31 RX ORDER — ASPIRIN 325 MG
325 TABLET, DELAYED RELEASE (ENTERIC COATED) ORAL 2 TIMES DAILY
Qty: 56 TABLET | Refills: 1 | Status: SHIPPED | OUTPATIENT
Start: 2022-03-31 | End: 2023-03-31

## 2022-03-31 RX ORDER — OXYCODONE HYDROCHLORIDE 5 MG/1
10 TABLET ORAL EVERY 4 HOURS PRN
Status: DISCONTINUED | OUTPATIENT
Start: 2022-03-31 | End: 2022-04-01 | Stop reason: HOSPADM

## 2022-03-31 RX ORDER — HYDRALAZINE HYDROCHLORIDE 20 MG/ML
10 INJECTION INTRAMUSCULAR; INTRAVENOUS
Status: DISCONTINUED | OUTPATIENT
Start: 2022-03-31 | End: 2022-03-31 | Stop reason: HOSPADM

## 2022-03-31 RX ORDER — DEXAMETHASONE SODIUM PHOSPHATE 10 MG/ML
8 INJECTION INTRAMUSCULAR; INTRAVENOUS ONCE
Status: COMPLETED | OUTPATIENT
Start: 2022-03-31 | End: 2022-03-31

## 2022-03-31 RX ORDER — OXYCODONE HYDROCHLORIDE 5 MG/1
5 TABLET ORAL EVERY 4 HOURS PRN
Status: DISCONTINUED | OUTPATIENT
Start: 2022-03-31 | End: 2022-04-01 | Stop reason: HOSPADM

## 2022-03-31 RX ORDER — ONDANSETRON 2 MG/ML
4 INJECTION INTRAMUSCULAR; INTRAVENOUS EVERY 6 HOURS PRN
Status: DISCONTINUED | OUTPATIENT
Start: 2022-03-31 | End: 2022-04-01 | Stop reason: HOSPADM

## 2022-03-31 RX ORDER — SODIUM CHLORIDE 0.9 % (FLUSH) 0.9 %
5-40 SYRINGE (ML) INJECTION PRN
Status: DISCONTINUED | OUTPATIENT
Start: 2022-03-31 | End: 2022-04-01 | Stop reason: SDUPTHER

## 2022-03-31 RX ORDER — SODIUM CHLORIDE 9 MG/ML
25 INJECTION, SOLUTION INTRAVENOUS PRN
Status: DISCONTINUED | OUTPATIENT
Start: 2022-03-31 | End: 2022-03-31 | Stop reason: HOSPADM

## 2022-03-31 RX ORDER — LIDOCAINE HYDROCHLORIDE 20 MG/ML
INJECTION, SOLUTION INFILTRATION; PERINEURAL PRN
Status: DISCONTINUED | OUTPATIENT
Start: 2022-03-31 | End: 2022-03-31 | Stop reason: SDUPTHER

## 2022-03-31 RX ORDER — ACETAMINOPHEN 500 MG
1000 TABLET ORAL ONCE
Status: COMPLETED | OUTPATIENT
Start: 2022-03-31 | End: 2022-03-31

## 2022-03-31 RX ORDER — SODIUM CHLORIDE, SODIUM LACTATE, POTASSIUM CHLORIDE, CALCIUM CHLORIDE 600; 310; 30; 20 MG/100ML; MG/100ML; MG/100ML; MG/100ML
INJECTION, SOLUTION INTRAVENOUS CONTINUOUS
Status: DISCONTINUED | OUTPATIENT
Start: 2022-03-31 | End: 2022-03-31

## 2022-03-31 RX ORDER — FENTANYL CITRATE 50 UG/ML
INJECTION, SOLUTION INTRAMUSCULAR; INTRAVENOUS PRN
Status: DISCONTINUED | OUTPATIENT
Start: 2022-03-31 | End: 2022-03-31 | Stop reason: SDUPTHER

## 2022-03-31 RX ORDER — GLYCOPYRROLATE 1 MG/5 ML
SYRINGE (ML) INTRAVENOUS PRN
Status: DISCONTINUED | OUTPATIENT
Start: 2022-03-31 | End: 2022-03-31 | Stop reason: SDUPTHER

## 2022-03-31 RX ORDER — HYDROCODONE BITARTRATE AND ACETAMINOPHEN 5; 325 MG/1; MG/1
1 TABLET ORAL EVERY 4 HOURS PRN
Qty: 40 TABLET | Refills: 0 | Status: SHIPPED | OUTPATIENT
Start: 2022-03-31 | End: 2022-04-07

## 2022-03-31 RX ORDER — MORPHINE SULFATE 4 MG/ML
4 INJECTION, SOLUTION INTRAMUSCULAR; INTRAVENOUS EVERY 4 HOURS PRN
Status: DISCONTINUED | OUTPATIENT
Start: 2022-03-31 | End: 2022-04-01 | Stop reason: HOSPADM

## 2022-03-31 RX ORDER — CEFAZOLIN SODIUM 2 G/50ML
SOLUTION INTRAVENOUS
Status: COMPLETED
Start: 2022-03-31 | End: 2022-03-31

## 2022-03-31 RX ORDER — VANCOMYCIN HYDROCHLORIDE 500 MG/10ML
INJECTION, POWDER, LYOPHILIZED, FOR SOLUTION INTRAVENOUS PRN
Status: DISCONTINUED | OUTPATIENT
Start: 2022-03-31 | End: 2022-03-31 | Stop reason: ALTCHOICE

## 2022-03-31 RX ORDER — SENNA AND DOCUSATE SODIUM 50; 8.6 MG/1; MG/1
1 TABLET, FILM COATED ORAL 2 TIMES DAILY
Status: DISCONTINUED | OUTPATIENT
Start: 2022-03-31 | End: 2022-04-01 | Stop reason: HOSPADM

## 2022-03-31 RX ORDER — CEFAZOLIN SODIUM 2 G/50ML
2000 SOLUTION INTRAVENOUS EVERY 8 HOURS
Status: COMPLETED | OUTPATIENT
Start: 2022-03-31 | End: 2022-03-31

## 2022-03-31 RX ORDER — CEFAZOLIN SODIUM 2 G/50ML
2000 SOLUTION INTRAVENOUS
Status: COMPLETED | OUTPATIENT
Start: 2022-03-31 | End: 2022-03-31

## 2022-03-31 RX ADMIN — CELECOXIB 100 MG: 100 CAPSULE ORAL at 09:19

## 2022-03-31 RX ADMIN — TRANEXAMIC ACID 1000 MG: 1 INJECTION, SOLUTION INTRAVENOUS at 10:19

## 2022-03-31 RX ADMIN — LIDOCAINE HYDROCHLORIDE 80 MG: 20 INJECTION, SOLUTION INFILTRATION; PERINEURAL at 10:17

## 2022-03-31 RX ADMIN — SODIUM CHLORIDE, POTASSIUM CHLORIDE, SODIUM LACTATE AND CALCIUM CHLORIDE: 600; 310; 30; 20 INJECTION, SOLUTION INTRAVENOUS at 09:18

## 2022-03-31 RX ADMIN — TRANEXAMIC ACID 1000 MG: 1 INJECTION, SOLUTION INTRAVENOUS at 12:45

## 2022-03-31 RX ADMIN — FENTANYL CITRATE 20 MCG: 50 INJECTION, SOLUTION INTRAMUSCULAR; INTRAVENOUS at 11:12

## 2022-03-31 RX ADMIN — PROPOFOL 120 MG: 10 INJECTION, EMULSION INTRAVENOUS at 10:17

## 2022-03-31 RX ADMIN — PHENYLEPHRINE HYDROCHLORIDE 100 MCG: 10 INJECTION INTRAVENOUS at 10:37

## 2022-03-31 RX ADMIN — ACETAMINOPHEN 1000 MG: 500 TABLET ORAL at 09:19

## 2022-03-31 RX ADMIN — FENTANYL CITRATE 25 MCG: 50 INJECTION, SOLUTION INTRAMUSCULAR; INTRAVENOUS at 12:21

## 2022-03-31 RX ADMIN — PHENYLEPHRINE HYDROCHLORIDE 100 MCG: 10 INJECTION INTRAVENOUS at 11:01

## 2022-03-31 RX ADMIN — CEFAZOLIN SODIUM 2000 MG: 2 SOLUTION INTRAVENOUS at 10:06

## 2022-03-31 RX ADMIN — ONDANSETRON 4 MG: 2 INJECTION INTRAMUSCULAR; INTRAVENOUS at 11:32

## 2022-03-31 RX ADMIN — CEFAZOLIN SODIUM 2000 MG: 2 SOLUTION INTRAVENOUS at 12:30

## 2022-03-31 RX ADMIN — PHENYLEPHRINE HYDROCHLORIDE 100 MCG: 10 INJECTION INTRAVENOUS at 10:52

## 2022-03-31 RX ADMIN — Medication 0.6 MG: at 11:35

## 2022-03-31 RX ADMIN — DEXAMETHASONE SODIUM PHOSPHATE 10 MG: 10 INJECTION, SOLUTION INTRAMUSCULAR; INTRAVENOUS at 10:37

## 2022-03-31 RX ADMIN — Medication 3 MG: at 11:35

## 2022-03-31 RX ADMIN — SODIUM CHLORIDE, POTASSIUM CHLORIDE, SODIUM LACTATE AND CALCIUM CHLORIDE: 600; 310; 30; 20 INJECTION, SOLUTION INTRAVENOUS at 10:00

## 2022-03-31 RX ADMIN — ROCURONIUM BROMIDE 50 MG: 10 INJECTION, SOLUTION INTRAVENOUS at 10:17

## 2022-03-31 RX ADMIN — SENNOSIDES AND DOCUSATE SODIUM 1 TABLET: 50; 8.6 TABLET ORAL at 20:57

## 2022-03-31 RX ADMIN — FENTANYL CITRATE 100 MCG: 50 INJECTION, SOLUTION INTRAMUSCULAR; INTRAVENOUS at 10:17

## 2022-03-31 RX ADMIN — SODIUM CHLORIDE, POTASSIUM CHLORIDE, SODIUM LACTATE AND CALCIUM CHLORIDE: 600; 310; 30; 20 INJECTION, SOLUTION INTRAVENOUS at 10:40

## 2022-03-31 RX ADMIN — FENTANYL CITRATE 25 MCG: 50 INJECTION INTRAMUSCULAR; INTRAVENOUS at 12:21

## 2022-03-31 RX ADMIN — CEFAZOLIN SODIUM 2000 MG: 2 SOLUTION INTRAVENOUS at 20:41

## 2022-03-31 RX ADMIN — ROCURONIUM BROMIDE 10 MG: 10 INJECTION, SOLUTION INTRAVENOUS at 11:17

## 2022-03-31 RX ADMIN — PHENYLEPHRINE HYDROCHLORIDE 100 MCG: 10 INJECTION INTRAVENOUS at 11:11

## 2022-03-31 RX ADMIN — ASPIRIN 325 MG: 325 TABLET, COATED ORAL at 20:57

## 2022-03-31 RX ADMIN — PHENYLEPHRINE HYDROCHLORIDE 100 MCG: 10 INJECTION INTRAVENOUS at 11:27

## 2022-03-31 RX ADMIN — DEXAMETHASONE SODIUM PHOSPHATE 8 MG: 10 INJECTION INTRAMUSCULAR; INTRAVENOUS at 09:19

## 2022-03-31 SDOH — ECONOMIC STABILITY: TRANSPORTATION INSECURITY
IN THE PAST 12 MONTHS, HAS THE LACK OF TRANSPORTATION KEPT YOU FROM MEDICAL APPOINTMENTS OR FROM GETTING MEDICATIONS?: NO

## 2022-03-31 SDOH — ECONOMIC STABILITY: TRANSPORTATION INSECURITY
IN THE PAST 12 MONTHS, HAS LACK OF TRANSPORTATION KEPT YOU FROM MEETINGS, WORK, OR FROM GETTING THINGS NEEDED FOR DAILY LIVING?: NO

## 2022-03-31 ASSESSMENT — PULMONARY FUNCTION TESTS
PIF_VALUE: 20
PIF_VALUE: 15
PIF_VALUE: 21
PIF_VALUE: 18
PIF_VALUE: 20
PIF_VALUE: 1
PIF_VALUE: 20
PIF_VALUE: 21
PIF_VALUE: 1
PIF_VALUE: 20
PIF_VALUE: 19
PIF_VALUE: 21
PIF_VALUE: 20
PIF_VALUE: 19
PIF_VALUE: 20
PIF_VALUE: 3
PIF_VALUE: 21
PIF_VALUE: 20
PIF_VALUE: 15
PIF_VALUE: 20
PIF_VALUE: 19
PIF_VALUE: 2
PIF_VALUE: 21
PIF_VALUE: 21
PIF_VALUE: 1
PIF_VALUE: 20
PIF_VALUE: 15
PIF_VALUE: 20
PIF_VALUE: 18
PIF_VALUE: 21
PIF_VALUE: 20
PIF_VALUE: 18
PIF_VALUE: 21
PIF_VALUE: 19
PIF_VALUE: 20
PIF_VALUE: 19
PIF_VALUE: 20
PIF_VALUE: 20
PIF_VALUE: 15
PIF_VALUE: 20
PIF_VALUE: 20
PIF_VALUE: 21
PIF_VALUE: 20
PIF_VALUE: 19
PIF_VALUE: 27
PIF_VALUE: 5
PIF_VALUE: 15
PIF_VALUE: 19
PIF_VALUE: 20
PIF_VALUE: 20
PIF_VALUE: 19
PIF_VALUE: 4
PIF_VALUE: 15
PIF_VALUE: 24
PIF_VALUE: 20
PIF_VALUE: 20
PIF_VALUE: 32
PIF_VALUE: 20
PIF_VALUE: 3
PIF_VALUE: 21
PIF_VALUE: 18
PIF_VALUE: 20
PIF_VALUE: 18
PIF_VALUE: 20
PIF_VALUE: 21
PIF_VALUE: 19
PIF_VALUE: 15
PIF_VALUE: 1
PIF_VALUE: 20
PIF_VALUE: 21
PIF_VALUE: 21
PIF_VALUE: 1
PIF_VALUE: 19
PIF_VALUE: 21
PIF_VALUE: 21
PIF_VALUE: 20
PIF_VALUE: 20
PIF_VALUE: 15
PIF_VALUE: 19
PIF_VALUE: 20
PIF_VALUE: 21
PIF_VALUE: 14
PIF_VALUE: 20
PIF_VALUE: 21
PIF_VALUE: 20
PIF_VALUE: 20
PIF_VALUE: 5
PIF_VALUE: 20
PIF_VALUE: 3
PIF_VALUE: 20
PIF_VALUE: 21

## 2022-03-31 ASSESSMENT — PAIN DESCRIPTION - ORIENTATION
ORIENTATION: LEFT
ORIENTATION: LEFT

## 2022-03-31 ASSESSMENT — PAIN SCALES - GENERAL
PAINLEVEL_OUTOF10: 2
PAINLEVEL_OUTOF10: 0
PAINLEVEL_OUTOF10: 5
PAINLEVEL_OUTOF10: 0

## 2022-03-31 ASSESSMENT — ENCOUNTER SYMPTOMS: SHORTNESS OF BREATH: 0

## 2022-03-31 ASSESSMENT — PAIN - FUNCTIONAL ASSESSMENT
PAIN_FUNCTIONAL_ASSESSMENT: 0-10
PAIN_FUNCTIONAL_ASSESSMENT: ACTIVITIES ARE NOT PREVENTED
PAIN_FUNCTIONAL_ASSESSMENT: ACTIVITIES ARE NOT PREVENTED

## 2022-03-31 ASSESSMENT — PAIN DESCRIPTION - LOCATION
LOCATION: HIP
LOCATION: HIP

## 2022-03-31 ASSESSMENT — PAIN DESCRIPTION - PROGRESSION
CLINICAL_PROGRESSION: GRADUALLY IMPROVING
CLINICAL_PROGRESSION: GRADUALLY IMPROVING

## 2022-03-31 ASSESSMENT — PAIN DESCRIPTION - FREQUENCY
FREQUENCY: INTERMITTENT
FREQUENCY: INTERMITTENT

## 2022-03-31 ASSESSMENT — PAIN DESCRIPTION - DESCRIPTORS
DESCRIPTORS: ACHING;DISCOMFORT
DESCRIPTORS: ACHING;DISCOMFORT

## 2022-03-31 ASSESSMENT — PAIN DESCRIPTION - ONSET
ONSET: ON-GOING
ONSET: ON-GOING

## 2022-03-31 ASSESSMENT — PAIN DESCRIPTION - PAIN TYPE
TYPE: SURGICAL PAIN
TYPE: SURGICAL PAIN

## 2022-03-31 NOTE — ANESTHESIA PRE PROCEDURE
Department of Anesthesiology  Preprocedure Note       Name:  Shakir Guzman   Age:  79 y.o.  :  1940                                          MRN:  [id number]         Date:  3/31/2022      Surgeon: Deepali Jennings):  CHI Figueroa DO    Procedure: Procedure(s):  REVISION LEFT TOTAL HIP ([address])    Medications prior to admission:   Prior to Admission medications    Medication Sig Start Date End Date Taking? Authorizing Provider   atorvastatin (LIPITOR) 40 MG tablet TAKE 1 TABLET BY MOUTH ONCE DAILY ON MONDAY, WEDNESDAY, AND FRIDAY 3/16/22   Lina Bath, DO   amLODIPine-benazepril (LOTREL) 5-10 MG per capsule Take 1 capsule by mouth daily 3/16/22   Lina Bath, DO   amLODIPine-benazepril (LOTREL) 2.5-10 MG per capsule TAKE 1 CAPSULE BY MOUTH DAILY 3/14/22   Lina Bath, DO   aspirin 81 MG EC tablet Take 81 mg by mouth daily    Historical Provider, MD   vitamin D (CHOLECALCIFEROL) 1000 UNITS TABS tablet Take 2,000 Units by mouth daily    Historical Provider, MD   Red Yeast Rice 600 MG CAPS Take 2 capsules by mouth three times a week    Historical Provider, MD   Multiple Vitamins-Minerals (CENTRUM SILVER ADULT 50+ PO) Take  by mouth daily. Historical Provider, MD   calcium-vitamin D (OSCAL 500/200 D-3) 500-200 MG-UNIT per tablet Take 1 tablet by mouth 2 times daily. Historical Provider, MD   NONFORMULARY daily.  Tunde Steven    Historical Provider, MD       Current medications:    Current Facility-Administered Medications   Medication Dose Route Frequency Provider Last Rate Last Admin    sodium chloride flush 0.9 % injection 5-40 mL  5-40 mL IntraVENous 2 times per day Justen Burton MD        sodium chloride flush 0.9 % injection 5-40 mL  5-40 mL IntraVENous PRN Justen Burton MD        0.9 % sodium chloride infusion  25 mL IntraVENous PRN Justen Burton MD        lactated ringers infusion   IntraVENous Continuous Justen Burton MD        acetaminophen (TYLENOL) tablet 1,000 mg  1,000 mg Oral Once [facility] Said, DO        celecoxib (CELEBREX) capsule 100 mg  100 mg Oral Once K Mariano Pass, DO        dexamethasone (DECADRON) injection 8 mg  8 mg IntraVENous Once K Mariano Pass, DO        sodium chloride flush 0.9 % injection 5-40 mL  5-40 mL IntraVENous 2 times per day K Mariano Pass, DO        sodium chloride flush 0.9 % injection 5-40 mL  5-40 mL IntraVENous PRN K Mariano Pass, DO        0.9 % sodium chloride infusion  25 mL IntraVENous PRN K Mariano Pass, DO        ceFAZolin (ANCEF) 2000 mg in dextrose 3 % 50 mL IVPB (duplex)  2,000 mg IntraVENous [facility], DO        tranexamic acid (CYKLOKAPRON) 1,000 mg in dextrose 5 % 100 mL IVPB  1,000 mg IntraVENous [facility] DO           Allergies:  No Known Allergies    Problem List:    Patient Active Problem List   Diagnosis Code    Degenerative arthritis of hip R M16.9    Primary osteoarthritis of left knee M17.12    Essential hypertension I10    Familial hypercholesterolemia E78.01    Osteoporosis M81.0    Spinal stenosis of lumbar region M48.061    Lumbar radiculopathy M54.16    DDD (degenerative disc disease), lumbar M51.36    Synovial cyst of lumbar facet joint M71.38    Arthritis of right knee M17.11    DJD (degenerative joint disease) M19.90       Past Medical History:        Diagnosis Date    Arthritis     right hip    Cancer ([facility] [address]     breast 1997 approx. left    Hyperlipidemia     Hypertension     Osteoarthrosis     Osteoporosis     Other chronic postoperative pain     Undiagnosed cardiac murmurs        Past Surgical History:        Procedure Laterality Date    ANESTHESIA NERVE BLOCK Left 4/30/2019    LUMBAR EPIDURAL STERIOD INJECTION L4-5  (LEFT PARAMEDIAN APPROACH) performed by Kasie Schroeder MD at [address] [place] [address]      biopsied on left breast    COLONOSCOPY      EPIDURAL STEROID INJECTION Left 3/19/2019    LEFT LUMBAR TRANSFORAMINAL EPIDURAL STEROID INJECTION: L4 / L5 performed by Janette Abbott MD at [address]      right ankle    HYSTERECTOMY  1979    JOINT REPLACEMENT      L hip     JOINT REPLACEMENT  10/2013    R hip    JOINT REPLACEMENT  2016    L knee     JOINT REPLACEMENT  2020    R knee     NERVE BLOCK Left 2019    left lumbar transforaminal epidural    NERVE BLOCK Left 2019    lumbar    TONSILLECTOMY      TOTAL HIP ARTHROPLASTY Right Oct 2013    with cell saver    TOTAL KNEE ARTHROPLASTY Left 3/8/16    TOTAL KNEE ARTHROPLASTY Right 11/3/2020    TOTAL RIGHT KNEE REPLACEMENT/ ARTHROPLASTY (BERNABE) performed by Kemp Leventhal, DO at [address] History:    Social History     Tobacco Use    Smoking status: Former Smoker     Packs/day: 0.25     Years: 3.00     Pack years: 0.75     Quit date: 9/10/1998     Years since quittin.5    Smokeless tobacco: Never Used    Tobacco comment: quit smoking approx 20 yrs ago   Substance Use Topics    Alcohol use:  Yes     Alcohol/week: 1.0 standard drink     Types: 1 Glasses of wine per week     Comment: occasionally                                Counseling given: Not Answered  Comment: quit smoking approx 20 yrs ago      Vital Signs (Current):   Vitals:    22 0857   BP: 135/63   Pulse: 86   Resp: 16   Temp: 98 °F (36.7 °C)   TempSrc: Infrared   SpO2: 96%                                              BP Readings from Last 3 Encounters:   22 135/63   22 120/68   22 (!) 146/75       NPO Status: Time of last liquid consumption:                         Time of last solid consumption:                         Date of last liquid consumption: 22                        Date of last solid food consumption: 22    BMI:   Wt Readings from Last 3 Encounters:   22 175 lb (79.4 kg)   22 176 lb 14.4 oz (80.2 kg)   22 171 lb 14.4 oz (78 kg) There is no height or weight on file to calculate BMI.    CBC:   Lab Results   Component Value Date    WBC 7.0 03/22/2022    RBC 4.75 03/22/2022    HGB 14.1 03/22/2022    HCT 43.9 03/22/2022    MCV 92.4 03/22/2022    RDW 13.2 03/22/2022     03/22/2022       CMP:   Lab Results   Component Value Date     03/22/2022    K 4.4 03/22/2022     03/22/2022    CO2 28 03/22/2022    BUN 33 03/22/2022    CREATININE 0.7 03/22/2022    GFRAA >60 03/22/2022    LABGLOM >60 03/22/2022    GLUCOSE 102 03/22/2022    GLUCOSE 87 01/30/2012    PROT 6.9 03/22/2022    CALCIUM 10.3 03/22/2022    BILITOT 0.5 03/22/2022    ALKPHOS 48 03/22/2022    AST 18 03/22/2022    ALT 20 03/22/2022       POC Tests: No results for input(s): POCGLU, POCNA, POCK, POCCL, POCBUN, POCHEMO, POCHCT in the last 72 hours.     Coags:   Lab Results   Component Value Date    PROTIME 12.0 03/22/2022    INR 1.0 03/22/2022    APTT 29.6 03/22/2022       HCG (If Applicable): No results found for: PREGTESTUR, PREGSERUM, HCG, HCGQUANT     ABGs: No results found for: PHART, PO2ART, QZJ6HKF, ETP9ZCI, BEART, H2XGNNOK     Type & Screen (If Applicable):  No results found for: LABABO, LABRH    Drug/Infectious Status (If Applicable):  No results found for: HIV, HEPCAB    COVID-19 Screening (If Applicable):   Lab Results   Component Value Date    COVID19 Not Detected 10/28/2020         Anesthesia Evaluation  Patient summary reviewed no history of anesthetic complications:   Airway: Mallampati: II  TM distance: >3 FB   Neck ROM: full  Mouth opening: > = 3 FB Dental: normal exam         Pulmonary: breath sounds clear to auscultation      (-) COPD and shortness of breath                          ROS comment: Former smoker   Cardiovascular:  Exercise tolerance: good (>4 METS),   (+) hypertension: moderate, hyperlipidemia    (-) past MI and CAD    ECG reviewed  Rhythm: regular  Rate: normal                    Neuro/Psych:   (+) neuromuscular disease:, GI/Hepatic/Renal:        (-) liver disease and no renal disease       Endo/Other:    (+) : arthritis: OA., .    (-) diabetes mellitus, hypothyroidism        Pt had PAT visit. Abdominal:             Vascular: negative vascular ROS. Other Findings:               Anesthesia Plan      general     ASA 3       Induction: intravenous. MIPS: Postoperative opioids intended and Prophylactic antiemetics administered. Anesthetic plan and risks discussed with patient. Plan discussed with CRNA. PAT Chart Review:  Chart reviewed per routine by Theron Lama MD.  Above represents information available via shared medical record including previous anesthesia history, drug and allergy history. Confirmation of above and final plan per Day of Surgery (DOS) anesthesiologist.    DOS STAFF ADDENDUM:    Pt seen and examined, chart reviewed (including anesthesia, drug and allergy history). Anesthetic plan, risks, benefits, alternatives, and personnel involved discussed with patient. Patient verbalized an understanding and agrees to proceed. Plan discussed with care team members and agreed upon.     Theron Lama MD  Staff Anesthesiologist  9:16 AM    Theron Lama MD   3/31/2022  Assessment reviewed  ARETHA Naranjo - CRNA

## 2022-03-31 NOTE — PROGRESS NOTES
Physical Therapy Initial Evaluation/Plan of Care    Room #:  0320/0320-01  Patient Name: Shauna Doherty  YOB: 1940  MRN: [id number]    Date of Service: 3/31/2022     Tentative placement recommendation: Home Health PT 5 days a week   Equipment recommendation: Patient has needed equipment       Evaluating Physical Therapist: Vicente Limon [address] [facility] #[id number]     Specific Provider Orders/Date/Referring Provider :  03/31/22 1345   PT eval and treat Start: 03/31/22 1345, End: 03/31/22 1345, ONE TIME, Standing Count: 1 Occurrences, R    Delio Nguyen DO      Admitting Diagnosis:   Failed total hip arthroplasty with dislocation, sequela [T84.028S, Z96.649]  DJD (degenerative joint disease) [M19.90]   Visit diagnosis:   Visit Diagnoses       Codes    Failed total hip arthroplasty with dislocation, sequela    -  Primary T84.028S, Z96.649    History of surgery     Z98.890        Surgery:  DATE OF PROCEDURE: 3/31/2022   SURGEON: Beka Valerio   ASSISTANT: Janet Madera   PREOPERATIVE DIAGNOSIS: 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   POSTOPERATIVE DIAGNOSIS: Same   OPERATION: Revision left hip arthroplasty to constrained bearings         Patient Active Problem List   Diagnosis    Degenerative arthritis of hip R    Primary osteoarthritis of left knee    Essential hypertension    Familial hypercholesterolemia    Osteoporosis    Spinal stenosis of lumbar region    Lumbar radiculopathy    DDD (degenerative disc disease), lumbar    Synovial cyst of lumbar facet joint    Arthritis of right knee    DJD (degenerative joint disease)       ASSESSMENT of current deficits: Patient exhibits decreased strength, balance, endurance, range of motion and pain left hip impairing functional mobility, transfers, gait , gait distance and tolerance to activity left hip precaution s/p revision due to hip dislocation x 2. Step to gait pattern, cues for upright, sequencing and safety.   Patient requires skilled physical therapy to address concerns listed above to increase safety and independence at discharge. PHYSICAL THERAPY  PLAN OF CARE       Physical therapy plan of care is established based on physician order,  patient diagnosis and clinical assessment    Current Treatment Recommendations:    -Bed Mobility: Lower extremity exercises   -Sitting Balance: Incorporate reaching activities to activate trunk muscles   -Standing Balance: Perform strengthening exercises in standing to promote motor control with or without upper extremity support   -Transfers: Provide instruction on proper hand and foot position for adequate transfer of weight onto lower extremities and use of gait device if needed and Cues for hand placement, technique and safety. Provide stabilization to prevent fall   -Gait: Gait training, Standing activities to improve: base of support, weight shift, weight bearing  and Pregait training to emphasize: Sequencing , Increased step length , Device control, Upright and Safety   -Endurance: Utilize Supervised activities to increase level of endurance to allow for safe functional mobility including transfers and gait   -Stairs: Stair training with instruction on proper technique and hand placement on rail    PT long term treatment goals are located in below grid    Patient and or family understand(s) diagnosis, prognosis, and plan of care. Frequency of treatments: Patient will be seen  twice daily  for therapeutic exercise, functional retraining, endurance activities, balance exercises, family and patient education. Prior Level of Function: Patient ambulated independently    Rehab Potential: good    for baseline    Past medical history:   Past Medical History:   Diagnosis Date    Arthritis     right hip    Cancer ([facility] [address]     breast 1997 approx. left    Hyperlipidemia     Hypertension     Osteoarthrosis     Osteoporosis     Other chronic postoperative pain     Undiagnosed cardiac murmurs      Past Surgical History:   Procedure Laterality Date    ANESTHESIA NERVE BLOCK Left 4/30/2019    LUMBAR EPIDURAL STERIOD INJECTION L4-5  (LEFT PARAMEDIAN APPROACH) performed by Sintia Spain MD at [address]      biopsied on left breast    COLONOSCOPY      EPIDURAL STEROID INJECTION Left 3/19/2019    LEFT LUMBAR TRANSFORAMINAL EPIDURAL STEROID INJECTION: L4 / L5 performed by Sintia Spain MD at [address]      right ankle    HYSTERECTOMY  1979    JOINT REPLACEMENT      L hip     JOINT REPLACEMENT  10/2013    R hip    JOINT REPLACEMENT  03/08/2016    L knee     JOINT REPLACEMENT  11/03/2020    R knee     NERVE BLOCK Left 03/19/2019    left lumbar transforaminal epidural    NERVE BLOCK Left 04/30/2019    lumbar    TONSILLECTOMY      TOTAL HIP ARTHROPLASTY Right Oct 2013    with cell saver    TOTAL KNEE ARTHROPLASTY Left 3/8/16    TOTAL KNEE ARTHROPLASTY Right 11/3/2020    TOTAL RIGHT KNEE REPLACEMENT/ ARTHROPLASTY (BERNABE) performed by Jim Barron DO at [address]:    Precautions:  Up as tolerated, falls, alarm and hip precautions ,left lower extremity FWB (full weight bearing)   history of dislocations     Social history: Patient lives alone in a ranch home  with 3 steps  to enter with [employer] in shower grab bars, built in shower chair    Equipment owned: Christopher Sanchez and 3-in-1 [facility],       [address] [facility]   How much difficulty turning over in bed?: A Little  How much difficulty sitting down on / standing up from a chair with arms?: A Little  How much difficulty moving from lying on back to sitting on side of bed?: A Lot  How much help from another person moving to and from a bed to a chair?: A Little  How much help from another person needed to walk in hospital room?: A Little  How much help from another person for climbing 3-5 steps with a railing?: A Lot  AM-PAC Inpatient Mobility Raw Score : 16  AM-PAC Inpatient T-Scale Score : 40.78  Mobility Inpatient CMS 0-100% Score: 54.16  Mobility Inpatient CMS G-Code Modifier : CK    Nursing cleared patient for PT evaluation. The admitting diagnosis and active problem list as listed above have been reviewed prior to the initiation of this evaluation. OBJECTIVE:   Initial Evaluation  Date: 3/31/2022 Treatment Date: Short Term/ Long Term   Goals   Was pt agreeable to Eval/treatment? Yes     Pain Level  5/10   Left hip        Bed Mobility  Rolling: Not assessed     Supine to sit: Moderate assist of 1    Sit to supine: Not assessed     Scooting: Moderate assist of 1    Rolling: Independent    Supine to sit:  Independent    Sit to supine: Independent    Scooting: Independent     Transfers Sit to stand: Minimal assist of 1 bed elevated Cues for hand placement and safety   Sit to stand: Modified Independent     Ambulation    40 feet using  wheeled walker with Minimal assist of 1   Patient with flexed posture, decreased step length and step to gait pattern and cues for sequencing, upright posture and safety  100 feet using  wheeled walker with Modified Independent    Stair negotiation: ascended and descended   Not assessed       3 steps 1 rail with supervision    ROM Within functional limits except Left hip within precautions      Increase range of motion 10% of affected joints    Strength Within functional limits  except  Left lower extremity 3/5  Within functional limits   Balance Sitting EOB:  good  -  Dynamic Standing:  fair    wheeled walker   Sitting EOB:  good    Dynamic Standing:  good wheeled walker      Patient is Alert & Oriented x person, place, time and situation and follows directions    Sensation:  Patient denies numbness/tingling  Edema:  none noted   Vitals:  Blood Pressure at rest   Blood Pressure during session     Heart Rate at rest   Heart Rate during session 106   SPO2 at rest  %  SPO2 during session 93%     Patient education  Patient educated on role of Physical Therapy, risks of immobility, safety and plan of care,  importance of mobility while in hospital , ankle pumps, quad set and glut set for edema control, blood clot prevention, hip precautions and weight bearing status       Patient response to education:   Pt verbalized understanding Pt demonstrated skill Pt requires further education in this area   Yes Partial Yes       Treatment:  Patient practiced and was instructed in the following treatment:     Therapist educated and facilitated patient on techniques to increase safety and independence with bed mobility, balance, functional transfers, and functional mobility. Instruction in hip precautions; no > 90*, crossing midline or pivoting/internal rotation  Instruction and performance of incentive inspirometer. Patient performed ankle pumps, quad sets, glut sets x 15-20 each. Active assist heelslide, hip abduction/adduction, straight leg raise x 10 each    Sat edge of bed 10 minutes with Minimal assist of 1 to increase dynamic sitting balance and activity tolerance. ambulated in hallway, assisted up to chair completed exercises. At end of session, patient in chair with alarm call light and phone within reach,   all lines and tubes intact, nursing notified. Patient would benefit from continued skilled Physical Therapy to improve functional independence and quality of life. Patient's/ family goals   home      Patient and or family understand(s) diagnosis, prognosis, and plan of care. Frequency of treatments: Patient will be seen  twice daily  for therapeutic exercise, functional retraining, endurance activities, balance exercises, family and patient education.      Time in  1506  Time out  1550    Total Treatment Time  24 minutes    Evaluation time includes thorough review of current medical information, gathering information on past medical history/social history and prior level of function, completion of standardized testing/informal observation of tasks, assessment of data, and development of Plan of care and goals.      CPT codes:  Low Complexity PT evaluation (85236)  Therapeutic exercises (03317)   12 minutes  1 unit(s)  Gait Training (45974) 12 minutes 1 unit(s)    Luiza Huntley, PT

## 2022-03-31 NOTE — CARE COORDINATION
3/31/2022: SS Note/Discharge planning:  SS Consult for post-op d/c planning and HHC orders noted, met with pt, plans to stay overnight observation and to return home tomorrow, Yue Wick liaison for [place] notified of home therapy order and will follow for medical d/c to confirm start of care, pt has dme needed, nursing informed. Electronically signed by SILVER Menjivar on 3/31/2022 at 2:45 PM

## 2022-03-31 NOTE — OP NOTE
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 year old female with history of above preop diagnosis. I explained the risk, benefits, expected outcome, and alternatives to the procedure. Patient understands and is in agreement. PROCEDURE: The patient is brought the operating room after sites identified. Adequate general anesthetic was administered by anesthesia department the patient supine on the operating table. She was in turn to the right lateral cubitus position on the Olymp backpack with careful axillary padding. The left hip area was prepped and draped in a sterile fashion with the left lower extremity free. Additional prep was done afterwards. The posterior scar was used for guidance of the incision. The incision was made and deepened down through subcutaneous tissue with electrocautery supplying hemostasis throughout the procedure. Self-retaining retractors were used to hold the wound open. The fascia was incised in the gluteus fascia in line with the incision. This exposed the posterior aspect of the hip. The posterior capsule and short external rotators had a rent which was consistent with the dislocations. The head ball was well seated on the femoral neck. There was no displacement of the acetabular liner. Further exposure was done mobilizing soft tissues to gain access and the hip was dislocated and then a tamp used to disimpact the head ball. The component neck was protected from additional trauma.   The proximal femur was retracted out of the way to gain access to the acetabulum. The polyethylene bearing was removed from the acetabular shell which was stable within the pelvis. This was removed with the clamp with the screw disimpacted. This removed in an atraumatic fashion. Fibrous debris within the shell was collected for specimen for evaluation pathologically. Fluid was also taken for cultures. The area was then thoroughly irrigated and shocker solution was used on this for the appropriate time and then irrigated clear with saline. The 56 outer diameter 36 inner diameter strain polybearing with +4 offset was then impacted with excellent stability demonstrated on pullout test.  The locking ring for the acetabular rim was placed over the neck of the femur in the appropriate our orientation and then the new +8.5 head ball 36 outer diameter was impacted with excellent stability. The hip was reduced and the ball inserted and captured by the bearing and then the retaining ring was seated around the rim in symmetric fashion. The hip was then taken through range of motion including leverage which showed excellent stability. The area was then irrigated again and shocker solution placed in this for the appropriate time then irrigated clear with sterile saline. The wound was then closed in layers with absorbable suture with vancomycin powder in the joint. Stainless steel staples finalized the skin and Aquacel Ag dressing was applied. Patient's anesthetic was reversed she was taken recovery in stable condition. GROSS PATHOLOGY: Stable left total hip replacement arthroplasty with regards to acetabular shell and femoral component. Late dislocations of head from acetabular bearing with slight wear. COMPONENTS USED : [employer] [place] constrained cup liners size 56/36 with 4 mm offset. 36 mm diameter +8.5 neck length femoral head.     All reasonable efforts have been made to minimize the risk of errors that may occur in the use of voice recognition and other electronic means of charting.       Electronically signed by Vincent Guy DO on 3/31/22 at 12:03 PM EDT

## 2022-03-31 NOTE — FLOWSHEET NOTE
03/31/22 6212   Social/Functional History   Lives With Alone   Type of [address] One level   Home Access Stairs to enter with rails   Entrance Stairs - Number of Steps 3 steps to enter   Entrance Stairs - Rails Both   Bathroom Shower/Tub Walk-in shower   Bathroom Equipment Built-in shower seat;3-in-1 commode   Home Equipment Rolling walker   Receives Help From Family   3/31/2022: SS Note/Discharge planning:  Met with pt in PAT, pt having surgery for a revision of left total hip today 3/31, explained sw role for transition of care and reviewed rehab options and providers for  insurance, pt plans to return home day of surgery or stay one night observation, she lives alone but says her niece Quinn Long will be staying with her to help her and her son, Ling Sawant will transport her home, pt has home dme needed and prefers [facility], pt had agency in the past, referral made to [facility]. sw to follow post-op.  Electronically signed by SILVER Javier on 3/31/2022 at 9:52 AM

## 2022-04-01 VITALS
TEMPERATURE: 97.9 F | HEIGHT: 68 IN | RESPIRATION RATE: 16 BRPM | OXYGEN SATURATION: 96 % | WEIGHT: 173 LBS | HEART RATE: 83 BPM | DIASTOLIC BLOOD PRESSURE: 61 MMHG | SYSTOLIC BLOOD PRESSURE: 119 MMHG | BODY MASS INDEX: 26.22 KG/M2

## 2022-04-01 LAB
ALBUMIN SERPL-MCNC: 3.7 G/DL (ref 3.5–5.2)
ALP BLD-CCNC: 36 U/L (ref 35–104)
ALT SERPL-CCNC: 14 U/L (ref 0–32)
ANION GAP SERPL CALCULATED.3IONS-SCNC: 9 MMOL/L (ref 7–16)
AST SERPL-CCNC: 21 U/L (ref 0–31)
BACTERIA: ABNORMAL /HPF
BASOPHILS ABSOLUTE: 0.01 E9/L (ref 0–0.2)
BASOPHILS RELATIVE PERCENT: 0.1 % (ref 0–2)
BILIRUB SERPL-MCNC: 0.4 MG/DL (ref 0–1.2)
BILIRUBIN URINE: NEGATIVE
BLOOD, URINE: NEGATIVE
BUN BLDV-MCNC: 16 MG/DL (ref 6–23)
CALCIUM SERPL-MCNC: 9.1 MG/DL (ref 8.6–10.2)
CHLORIDE BLD-SCNC: 103 MMOL/L (ref 98–107)
CLARITY: CLEAR
CO2: 24 MMOL/L (ref 22–29)
COLOR: YELLOW
CREAT SERPL-MCNC: 0.6 MG/DL (ref 0.5–1)
EOSINOPHILS ABSOLUTE: 0 E9/L (ref 0.05–0.5)
EOSINOPHILS RELATIVE PERCENT: 0 % (ref 0–6)
EPITHELIAL CELLS, UA: ABNORMAL /HPF
GFR AFRICAN AMERICAN: >60
GFR NON-AFRICAN AMERICAN: >60 ML/MIN/1.73
GLUCOSE BLD-MCNC: 144 MG/DL (ref 74–99)
GLUCOSE URINE: NEGATIVE MG/DL
GRAM STAIN ORDERABLE: NORMAL
GRAM STAIN ORDERABLE: NORMAL
HCT VFR BLD CALC: 40.6 % (ref 34–48)
HEMOGLOBIN: 13.3 G/DL (ref 11.5–15.5)
IMMATURE GRANULOCYTES #: 0.07 E9/L
IMMATURE GRANULOCYTES %: 0.6 % (ref 0–5)
KETONES, URINE: NEGATIVE MG/DL
LEUKOCYTE ESTERASE, URINE: NEGATIVE
LYMPHOCYTES ABSOLUTE: 0.9 E9/L (ref 1.5–4)
LYMPHOCYTES RELATIVE PERCENT: 7.7 % (ref 20–42)
MAGNESIUM: 1.9 MG/DL (ref 1.6–2.6)
MCH RBC QN AUTO: 29.6 PG (ref 26–35)
MCHC RBC AUTO-ENTMCNC: 32.8 % (ref 32–34.5)
MCV RBC AUTO: 90.4 FL (ref 80–99.9)
MONOCYTES ABSOLUTE: 0.8 E9/L (ref 0.1–0.95)
MONOCYTES RELATIVE PERCENT: 6.9 % (ref 2–12)
NEUTROPHILS ABSOLUTE: 9.85 E9/L (ref 1.8–7.3)
NEUTROPHILS RELATIVE PERCENT: 84.7 % (ref 43–80)
NITRITE, URINE: NEGATIVE
PDW BLD-RTO: 13.2 FL (ref 11.5–15)
PH UA: 7 (ref 5–9)
PHOSPHORUS: 3.1 MG/DL (ref 2.5–4.5)
PLATELET # BLD: 213 E9/L (ref 130–450)
PMV BLD AUTO: 11 FL (ref 7–12)
POTASSIUM SERPL-SCNC: 4.5 MMOL/L (ref 3.5–5)
PROTEIN UA: NEGATIVE MG/DL
RBC # BLD: 4.49 E12/L (ref 3.5–5.5)
RBC UA: ABNORMAL /HPF (ref 0–2)
SODIUM BLD-SCNC: 136 MMOL/L (ref 132–146)
SPECIFIC GRAVITY UA: 1.01 (ref 1–1.03)
TOTAL PROTEIN: 6.3 G/DL (ref 6.4–8.3)
UROBILINOGEN, URINE: 0.2 E.U./DL
WBC # BLD: 11.6 E9/L (ref 4.5–11.5)
WBC UA: ABNORMAL /HPF (ref 0–5)

## 2022-04-01 PROCEDURE — 85025 COMPLETE CBC W/AUTO DIFF WBC: CPT

## 2022-04-01 PROCEDURE — 6370000000 HC RX 637 (ALT 250 FOR IP): Performed by: INTERNAL MEDICINE

## 2022-04-01 PROCEDURE — 83735 ASSAY OF MAGNESIUM: CPT

## 2022-04-01 PROCEDURE — 80053 COMPREHEN METABOLIC PANEL: CPT

## 2022-04-01 PROCEDURE — 81001 URINALYSIS AUTO W/SCOPE: CPT

## 2022-04-01 PROCEDURE — 97110 THERAPEUTIC EXERCISES: CPT

## 2022-04-01 PROCEDURE — 6370000000 HC RX 637 (ALT 250 FOR IP): Performed by: STUDENT IN AN ORGANIZED HEALTH CARE EDUCATION/TRAINING PROGRAM

## 2022-04-01 PROCEDURE — 97530 THERAPEUTIC ACTIVITIES: CPT

## 2022-04-01 PROCEDURE — 97165 OT EVAL LOW COMPLEX 30 MIN: CPT

## 2022-04-01 PROCEDURE — 36415 COLL VENOUS BLD VENIPUNCTURE: CPT

## 2022-04-01 PROCEDURE — 97535 SELF CARE MNGMENT TRAINING: CPT

## 2022-04-01 PROCEDURE — 84100 ASSAY OF PHOSPHORUS: CPT

## 2022-04-01 RX ORDER — LISINOPRIL 10 MG/1
10 TABLET ORAL DAILY
Status: DISCONTINUED | OUTPATIENT
Start: 2022-04-01 | End: 2022-04-01 | Stop reason: HOSPADM

## 2022-04-01 RX ORDER — ATORVASTATIN CALCIUM 40 MG/1
40 TABLET, FILM COATED ORAL NIGHTLY
Status: DISCONTINUED | OUTPATIENT
Start: 2022-04-01 | End: 2022-04-01 | Stop reason: HOSPADM

## 2022-04-01 RX ORDER — SODIUM CHLORIDE 0.9 % (FLUSH) 0.9 %
5-40 SYRINGE (ML) INJECTION PRN
Status: DISCONTINUED | OUTPATIENT
Start: 2022-04-01 | End: 2022-04-01 | Stop reason: HOSPADM

## 2022-04-01 RX ORDER — SODIUM CHLORIDE 9 MG/ML
25 INJECTION, SOLUTION INTRAVENOUS PRN
Status: DISCONTINUED | OUTPATIENT
Start: 2022-04-01 | End: 2022-04-01 | Stop reason: HOSPADM

## 2022-04-01 RX ORDER — NICOTINE POLACRILEX 4 MG
15 LOZENGE BUCCAL PRN
Status: DISCONTINUED | OUTPATIENT
Start: 2022-04-01 | End: 2022-04-01 | Stop reason: HOSPADM

## 2022-04-01 RX ORDER — AMLODIPINE BESYLATE 2.5 MG/1
2.5 TABLET ORAL DAILY
Status: DISCONTINUED | OUTPATIENT
Start: 2022-04-01 | End: 2022-04-01 | Stop reason: HOSPADM

## 2022-04-01 RX ORDER — AMLODIPINE BESYLATE AND BENAZEPRIL HYDROCHLORIDE 2.5; 1 MG/1; MG/1
1 CAPSULE ORAL DAILY
Status: DISCONTINUED | OUTPATIENT
Start: 2022-04-01 | End: 2022-04-01 | Stop reason: CLARIF

## 2022-04-01 RX ORDER — DEXTROSE MONOHYDRATE 50 MG/ML
100 INJECTION, SOLUTION INTRAVENOUS PRN
Status: DISCONTINUED | OUTPATIENT
Start: 2022-04-01 | End: 2022-04-01 | Stop reason: HOSPADM

## 2022-04-01 RX ORDER — SODIUM CHLORIDE 0.9 % (FLUSH) 0.9 %
5-40 SYRINGE (ML) INJECTION EVERY 12 HOURS SCHEDULED
Status: DISCONTINUED | OUTPATIENT
Start: 2022-04-01 | End: 2022-04-01 | Stop reason: HOSPADM

## 2022-04-01 RX ORDER — DEXTROSE MONOHYDRATE 25 G/50ML
12.5 INJECTION, SOLUTION INTRAVENOUS PRN
Status: DISCONTINUED | OUTPATIENT
Start: 2022-04-01 | End: 2022-04-01 | Stop reason: SDUPTHER

## 2022-04-01 RX ADMIN — SENNOSIDES AND DOCUSATE SODIUM 1 TABLET: 50; 8.6 TABLET ORAL at 07:56

## 2022-04-01 RX ADMIN — ASPIRIN 325 MG: 325 TABLET, COATED ORAL at 07:56

## 2022-04-01 RX ADMIN — LISINOPRIL 10 MG: 10 TABLET ORAL at 07:56

## 2022-04-01 RX ADMIN — AMLODIPINE BESYLATE 2.5 MG: 2.5 TABLET ORAL at 07:57

## 2022-04-01 ASSESSMENT — PAIN SCALES - GENERAL: PAINLEVEL_OUTOF10: 0

## 2022-04-01 NOTE — PROGRESS NOTES
OCCUPATIONAL THERAPY TREATMENT NOTE     [facility]  [facility] F[facility] Me[place]. [place]         [id number]  Patient Name: Eric Munoz  MRN: [id number]  : 1940  Room: [address] [place] [address]            Evaluating OT: Marcelleence Galeazzi OTR/L #[id number]      Referring Provider and Specific Provider Orders/Date:      22 134   OT eval and treat  Start:  22 1345,   End:  22 1345,   ONE TIME,   Standing Count:  1 Occurrences,   R         Elena Truong,        Placement Recommendation: Home with Home Health Therapy and family support         Diagnosis:   1. Failed total hip arthroplasty with dislocation, sequela    2. History of surgery           Surgery: S/p revision left hip arthroplasty to constrained bearings on 3/31/2022 by Dr. Peña Van       Pertinent Medical History:       Past Medical History        Past Medical History:   Diagnosis Date    Arthritis       right hip    Cancer [facility])       breast  approx. left    Hyperlipidemia      Hypertension      Osteoarthrosis      Osteoporosis      Other chronic postoperative pain      Undiagnosed cardiac murmurs              Past Surgical History   Past Surgical History:   Procedure Laterality Date    ANESTHESIA NERVE BLOCK Left 2019     LUMBAR EPIDURAL STERIOD INJECTION L4-5  (LEFT PARAMEDIAN APPROACH) performed by Janes Ortega MD at [address]         biopsied on left breast    COLONOSCOPY        EPIDURAL STEROID INJECTION Left 3/19/2019     LEFT LUMBAR TRANSFORAMINAL EPIDURAL STEROID INJECTION: L4 / L5 performed by Janes Ortega MD at [address] [facility]         cataracts OU    FRACTURE SURGERY         right ankle    HYSTERECTOMY   1979    JOINT REPLACEMENT         L hip     JOINT REPLACEMENT   10/2013     R hip    JOINT REPLACEMENT   2016     L knee     JOINT REPLACEMENT   2020     R knee  NERVE BLOCK Left 03/19/2019     left lumbar transforaminal epidural    NERVE BLOCK Left 04/30/2019     lumbar    TONSILLECTOMY        TOTAL HIP ARTHROPLASTY Right Oct 2013     with cell saver    TOTAL KNEE ARTHROPLASTY Left 3/8/16    TOTAL KNEE ARTHROPLASTY Right 11/3/2020     TOTAL RIGHT KNEE REPLACEMENT/ ARTHROPLASTY (BERNABE) performed by Sergio Quinonez DO at [place] [address]      Precautions:  Fall Risk, falls and alarm, weight bearing as tolerated, posterior hip precautions, history of dislocation.       Assessment of current deficits    [x]? Functional mobility            [x]?ADLs           [x]? Strength                   []?Cognition    [x]? Functional transfers          [x]? IADLs         [x]? Safety Awareness   [x]? Endurance    []? Fine Coordination              [x]? Balance      []? Vision/perception    []? Sensation      []? Gross Motor Coordination  []? ROM           []?  Delirium                   []? Motor Control      OT PLAN OF CARE   OT POC based on physician orders, patient diagnosis and results of clinical assessment     Frequency/Duration 2-5 days/wk for 2 weeks BID PRN      Specific OT Treatment Interventions to include:   * Instruction/training on adapted ADL techniques and AE recommendations to increase functional independence within precautions       * Training on energy conservation strategies, correct breathing pattern and techniques to improve independence/tolerance for self-care routine  * Functional transfer/mobility training/DME recommendations for increased independence, safety, and fall prevention  * Patient/Family education to increase follow through with safety techniques and functional independence  * Recommendation of environmental modifications for increased safety with functional transfers/mobility and ADLs  * Therapeutic exercise to improve motor endurance, ROM, and functional strength for ADLs/functional transfers  * Therapeutic activities to facilitate/challenge dynamic balance, stand tolerance for increased safety and independence with ADLs     Recommended Adaptive Equipment: wheeled walker, 3:1 commode, shower chair      Home Living: Lives alone, single family home, 1 story, 3 steps to enter with bilateral rail. Patient plans to stay at her daughters upon discharge which is a 1 story home, 1+1 step to main level. Bathroom set-up: Walk-in shower at both patient and daughter's home.          Equipment owned: wheeled walker, 3:1 commode, patient believes she has a shower chair in her basement.      Prior Level of Function: Independent with ADLs , Independent with IADLs; ambulated independently.     Driving: Yes  Enjoys: Reading      Pain Level: Patient denied pain.             Cognition: A&O: 4/4; Follows 2-3 step directions              Memory: intact              Sequencing: fair               Problem solving: fair               Judgement/safety: fair      [facility]   AM-PAC Daily Activity Inpatient   How much help for putting on and taking off regular lower body clothing?: A Lot  How much help for Bathing?: A Lot  How much help for Toileting?: A Little  How much help for putting on and taking off regular upper body clothing?: A Little  How much help for taking care of personal grooming?: A Little  How much help for eating meals?: A Little  AM-[facility] Inpatient Daily Activity Raw Score: 16  AM-PAC Inpatient ADL T-Scale Score : 35.96  ADL Inpatient CMS 0-100% Score: 53.32  ADL Inpatient CMS G-Code Modifier : CK                Functional Assessment:     Initial Eval Status  Date: 4/1/22    Treatment Status  Date: 4/1/22 STGs = LTGs  Time frame: 10-14 days   Feeding Supervision     n/t  Independent    Grooming Stand by Assist     SBA simulated standing  Moderate [place]    UB Dressing Stand by Assist    n/t Moderate [employer] [place]    LB Dressing Dependent   For clothing mgmt without AE d/t hip precautions     Instructions/training on use of AE.  Minimal/Moderate Assist to doff/don sock using reacher and sock aide. Compression stocking donned during session.     N/t  Moderate [place]    Bathing Moderate Assist   Requires assist for safety. Discussed DME benefits/needs for improved safety with bathing.      n/t educated with regards to DME, bathing AE  Moderate [place]    Toileting Minimal Assist   Simulated     n/t Moderate [place]    Bed Mobility  Supine to sit: Not assessed   Sit to supine: Not assessed      Patient up in chair at start/end of session.     N/t pt seated up in chair upon arrival  Supine to sit: Independent   Sit to supine: Independent    Functional Transfers Sit to stand: Minimal Assist   Stand to sit: Minimal Assist  (Elevated) Commode Transfer: Minimal Assist      Transfer training with verbal cues for hand placement throughout session to improve safety.     SBA Sit<>stand from chair 2x     SBA car transfer v/c's for hand placement and safety techniques      Moderate [place]    Functional Mobility Minimal Assist with wheeled walker to improve balance to/from bathroom, verbal cues for walker sequence and safety.     SBA with w/w house hold distances v/c's for safety   Moderate [place] with use of wheeled walker   Balance Sitting:     Static: SBA    Dynamic: SBA  Standing: CGA/SBA with walker    Sitting:   Static: supervision   Dynamic: supervision   Standing: SBA with w/w  Sitting:     Static: Indep    Dynamic: Indep  Standing: Mod I with walker   Activity Tolerance fair  plus  Fair+  Increase standing tolerance >5 minutes for improved engagement with functional transfers and indep in ADLs      Visual/  Perceptual Glasses: Yes, readers/not present at bedside     Reports changes in vision since admission: No       NA           Comments: Upon arrival pt seated in chair, agreeable to therapy session.  Pt educated with regards to functional transfers in/out car, simulated shower transfer techniques, hip precautions, safety awareness, functional mobility, hand placement, walker safety. At end of session pt seated in chair,  all lines and tubes intact, call light within reach. · Pt has made good  progress towards set goals.    · Continue with current plan of care      Treatment Time In:1110            Treatment Time Out: 1031                Treatment Charges: Mins Units   Ther Ex  99725     Manual Therapy [id number]     Thera Activities [id number] 3   ADL/Home Mgt [id number]     Neuro Re-ed 81853     Group Therapy      Orthotic manage/training  [id number]     Non-Billable Time     Total Timed Treatment [address] GOINS/L [address]

## 2022-04-01 NOTE — CONSULTS
Department of Internal Medicine  Internal Medicine Consultation Note    Primary Care Physician: Ladan Valera DO   Admitting Physician:  Gwyn Garnett DO  Admission date and time: 3/31/2022  8:31 AM    Room:  [address] [place] [address]  Admitting diagnosis: Failed total hip arthroplasty with dislocation, sequela [T84.028S, Z96.649]  DJD (degenerative joint disease) [M19.90]      Patient Name: Mary Anne Mercado  MRN: [id number]    Date of Service: 3/31/2022     Reason for consultation:  Medical management    History of present illness:    Patient is 50-year-old female who is status post left total hip arthroplasty secondary to joint dislocations in the past.  Patient currently resting in bed and states pain is well controlled. She denies any nausea or emesis. Denies any subjective fever or chills. Denies any lightheadedness or dizziness. Denies any chest pain shortness of breath. PAST MEDICAL Hx:  Past Medical History:   Diagnosis Date    Arthritis     right hip    Cancer ([address]     breast 1997 approx. left    Hyperlipidemia     Hypertension     Osteoarthrosis     Osteoporosis     Other chronic postoperative pain     Undiagnosed cardiac murmurs        PAST SURGICAL Hx:   Past Surgical History:   Procedure Laterality Date    ANESTHESIA NERVE BLOCK Left 4/30/2019    LUMBAR EPIDURAL STERIOD INJECTION L4-5  (LEFT PARAMEDIAN APPROACH) performed by Haresh Horan MD at [facility]      biopsied on left breast    COLONOSCOPY      EPIDURAL STEROID INJECTION Left 3/19/2019    LEFT LUMBAR TRANSFORAMINAL EPIDURAL STEROID INJECTION: L4 / L5 performed by Haresh Horan MD at [facility]      cataracts OU    FRACTURE SURGERY      right ankle    HYSTERECTOMY  1979    JOINT REPLACEMENT      L hip     JOINT REPLACEMENT  10/2013    R hip    JOINT REPLACEMENT  03/08/2016    L knee     JOINT REPLACEMENT  11/03/2020    R knee     NERVE BLOCK Left 03/19/2019    left lumbar transforaminal epidural    NERVE BLOCK Left 04/30/2019    lumbar    TONSILLECTOMY      TOTAL HIP ARTHROPLASTY Right Oct 2013    with cell saver    TOTAL KNEE ARTHROPLASTY Left 3/8/16    TOTAL KNEE ARTHROPLASTY Right 11/3/2020    TOTAL RIGHT KNEE REPLACEMENT/ ARTHROPLASTY (BERNABE) performed by Key Bennett DO at [address] Hx:  Family History   Problem Relation Age of Onset    Heart Disease Mother        HOME MEDICATIONS:  Prior to Admission medications    Medication Sig Start Date End Date Taking? Authorizing Provider   aspirin 325 MG EC tablet Take 1 tablet by mouth 2 times daily 3/31/22 3/31/23 Yes Lucrecia Pino DO   HYDROcodone-acetaminophen (NORCO) 5-325 MG per tablet Take 1 tablet by mouth every 4 hours as needed for Pain for up to 7 days. Intended supply: 7 days. Take lowest dose possible to manage pain 3/31/22 4/7/22 Yes Lucrecia Pino DO   atorvastatin (LIPITOR) 40 MG tablet Take 40 mg by mouth at bedtime   Yes Historical Provider, MD   amLODIPine-benazepril (LOTREL) 2.5-10 MG per capsule TAKE 1 CAPSULE BY MOUTH DAILY 3/14/22   Rosalba Fountain DO   vitamin D (CHOLECALCIFEROL) 1000 UNITS TABS tablet Take 2,000 Units by mouth daily    Historical Provider, MD   Red Yeast Rice 600 MG CAPS Take 2 capsules by mouth three times a week    Historical Provider, MD   Multiple Vitamins-Minerals (CENTRUM SILVER ADULT 50+ PO) Take  by mouth daily. Historical Provider, MD   calcium-vitamin D (OSCAL 500/200 D-3) 500-200 MG-UNIT per tablet Take 1 tablet by mouth 2 times daily. Historical Provider, MD   NONFORMULARY daily. Tunde Steven    Historical Provider, MD       ALLERGIES:  Patient has no known allergies. SOCIAL Hx:  Social History     Socioeconomic History    Marital status:       Spouse name: Not on file    Number of children: Not on file    Years of education: Not on file    Highest education level: Not on file   Occupational History    Not on file Tobacco Use    Smoking status: Former Smoker     Packs/day: 0.25     Years: 3.00     Pack years: 0.75     Quit date: 9/10/1998     Years since quittin.5    Smokeless tobacco: Never Used    Tobacco comment: quit smoking approx 20 yrs ago   Vaping Use    Vaping Use: Never used   Substance and Sexual Activity    Alcohol use: Yes     Alcohol/week: 1.0 standard drink     Types: 1 Glasses of wine per week     Comment: occasionally    Drug use: No    Sexual activity: Not Currently   Other Topics Concern    Not on file   Social History Narrative    Not on file     Social Determinants of Health     Financial Resource Strain: Low Risk     Difficulty of Paying Living Expenses: Not hard at all   Food Insecurity: No Food Insecurity    Worried About Running Out of Food in the Last Year: Never true    Dot of Food in the Last Year: Never true   Transportation Needs: No Transportation Needs    Lack of Transportation (Medical): No    Lack of Transportation (Non-Medical): No   Physical Activity: Inactive    Days of Exercise per Week: 0 days    Minutes of Exercise per Session: 0 min   Stress: No Stress Concern Present    Feeling of Stress :  Only a little   Social Connections:     Frequency of Communication with Friends and Family: Not on file    Frequency of Social Gatherings with Friends and Family: Not on file    Attends Voodoo Services: Not on file    Active Member of Clubs or Organizations: Not on file    Attends Club or Organization Meetings: Not on file    Marital Status: Not on file   Intimate Partner Violence:     Fear of Current or Ex-Partner: Not on file    Emotionally Abused: Not on file    Physically Abused: Not on file    Sexually Abused: Not on file   Housing Stability:     Unable to Pay for Housing in the Last Year: Not on file    Number of [place] in the Last Year: Not on file    Unstable Housing in the Last Year: Not on file       ROS: Positive in bold  General:   Denies chills, fatigue, fever, malaise, night sweats or weight loss    Psychological:   Denies anxiety, disorientation or hallucinations    ENT:    Denies epistaxis, headaches, vertigo or visual changes    Cardiovascular:   Denies any chest pain, irregular heartbeats, or palpitations. No paroxysmal nocturnal dyspnea. Respiratory:   Denies shortness of breath, coughing, sputum production, hemoptysis, or wheezing. No orthopnea. Gastrointestinal:   Denies nausea, vomiting, diarrhea, or constipation. Denies any abdominal pain. Denies change in bowel habits or stools. Genito-Urinary:    Denies any urgency, frequency, hematuria. Voiding without difficulty. Musculoskeletal:   Denies joint pain, joint stiffness, joint swelling or muscle pain    Neurology:    Denies any headache or focal neurological deficits. No weakness or paresthesia. Derm:    Denies any rashes, ulcers, or excoriations. Denies bruising. Extremities:   Denies any lower extremity swelling or edema. PHYSICAL EXAM: Abnormal findings noted  VITALS:  Vitals:    03/31/22 1645   BP: (!) 140/73   Pulse: 100   Resp: 19   Temp: 97.6 °F (36.4 °C)   SpO2: 93%         CONSTITUTIONAL:    Awake, alert, cooperative, no apparent distress, and appears stated age    EYES:     EOMI, sclera clear without icterus, conjunctiva normal    ENT:    Normocephalic, atraumatic, . External ears without lesions. NECK:    Supple, symmetrical, trachea midline,  no JVD    HEMATOLOGIC/LYMPHATICS:    No cervical lymphadenopathy and no supraclavicular lymphadenopathy    LUNGS:    Symmetric. No increased work of breathing, good air exchange, clear to auscultation bilaterally, no wheezes, rhonchi, or rales,     CARDIOVASCULAR:    Normal apical impulse, regular rate and rhythm, normal S1 and S2, no S3 or S4, and no murmur noted    ABDOMEN:     soft, non-distended, non-tender    MUSCULOSKELETAL:    There is no redness, warmth, or swelling of the joints. NEUROLOGIC:    Awake, alert, oriented to name, place and time. SKIN:    No bruising or bleeding. No redness, warmth, or swelling    EXTREMITIES:    Peripheral pulses present. No edema, cyanosis, or swelling. Patient has bilateral lower extremity edema  Patient has dressing to left hip which is intact without signs of infection/bleeding    LINES/CATHETERS     LABORATORY DATA:  CBC with Differential:    Lab Results   Component Value Date    WBC 7.0 03/22/2022    RBC 4.75 03/22/2022    HGB 14.1 03/22/2022    HCT 43.9 03/22/2022     03/22/2022    MCV 92.4 03/22/2022    MCH 29.7 03/22/2022    MCHC 32.1 03/22/2022    RDW 13.2 03/22/2022    SEGSPCT 56 12/20/2012    LYMPHOPCT 23.4 03/22/2022    MONOPCT 7.6 03/22/2022    BASOPCT 0.7 03/22/2022    MONOSABS 0.53 03/22/2022    LYMPHSABS 1.63 03/22/2022    EOSABS 0.19 03/22/2022    BASOSABS 0.05 03/22/2022     CMP:    Lab Results   Component Value Date     03/22/2022    K 4.4 03/22/2022     03/22/2022    CO2 28 03/22/2022    BUN 33 03/22/2022    CREATININE 0.7 03/22/2022    GFRAA >60 03/22/2022    LABGLOM >60 03/22/2022    GLUCOSE 102 03/22/2022    GLUCOSE 87 01/30/2012    PROT 6.9 03/22/2022    LABALBU 4.3 03/22/2022    LABALBU 4.6 01/30/2012    CALCIUM 10.3 03/22/2022    BILITOT 0.5 03/22/2022    ALKPHOS 48 03/22/2022    AST 18 03/22/2022    ALT 20 03/22/2022       ASSESSMENT/PLAN:  1. Revision left total hip arthroplasty   2. Chronic diastolic congestive heart failure  3. History of left sided breast cancer  4. Hyperlipidemia  5. Hypertension  6. Osteoporosis  7. History of tobacco abuse    Patient is status post revision left total hip arthroplasty per orthopedic surgery. Pain control, activity, diet per orthopedic surgery. Home medications to be resumed as appropriate. Routine blood work ordered.     Giles Goetz  8:38 PM  3/31/2022    Electronically signed by Fredy Alston DO on 3/31/22 at 8:38 PM EDT

## 2022-04-01 NOTE — PROGRESS NOTES
[facility]  [facility] [address]        Date:2022                                                  Patient Name: Shauna Doherty    MRN: [id number]    : 1940    Room: [address] [place] [address]      Evaluating OT: Olegario Gallegos OTR/L #[id number]     Referring Provider and Specific Provider Orders/Date:      22 1345  OT eval and treat  Start:  22 1345,   End:  22 1345,   ONE TIME,   Standing Count:  1 Occurrences,   Any Barraza,       Placement Recommendation: Home with [facility] and family support        Diagnosis:   1. Failed total hip arthroplasty with dislocation, sequela    2. History of surgery          Surgery: S/p revision left hip arthroplasty to constrained bearings on 3/31/2022 by Dr. Eiatn Rosales      Pertinent Medical History:       Past Medical History:   Diagnosis Date    Arthritis     right hip    Cancer ([address]     breast  approx. left    Hyperlipidemia     Hypertension     Osteoarthrosis     Osteoporosis     Other chronic postoperative pain     Undiagnosed cardiac murmurs          Past Surgical History:   Procedure Laterality Date    ANESTHESIA NERVE BLOCK Left 2019    LUMBAR EPIDURAL STERIOD INJECTION L4-5  (LEFT PARAMEDIAN APPROACH) performed by Severo Melara MD at [facility]      biopsied on left breast    COLONOSCOPY      EPIDURAL STEROID INJECTION Left 3/19/2019    LEFT LUMBAR TRANSFORAMINAL EPIDURAL STEROID INJECTION: L4 / L5 performed by Severo Melara MD at [facility]      cataracts OU    FRACTURE SURGERY      right ankle    HYSTERECTOMY  1979    JOINT REPLACEMENT      L hip     JOINT REPLACEMENT  10/2013    R hip    JOINT REPLACEMENT  2016    L knee     JOINT REPLACEMENT  2020    R knee     NERVE BLOCK Left 2019    left lumbar transforaminal epidural    NERVE BLOCK Left 04/30/2019    lumbar    TONSILLECTOMY      TOTAL HIP ARTHROPLASTY Right Oct 2013    with cell saver    TOTAL KNEE ARTHROPLASTY Left 3/8/16    TOTAL KNEE ARTHROPLASTY Right 11/3/2020    TOTAL RIGHT KNEE REPLACEMENT/ ARTHROPLASTY (BERNABE) performed by David Gamez DO at [address] OR        Precautions:  Fall Risk, falls and alarm, weight bearing as tolerated, posterior hip precautions, history of dislocation.       Assessment of current deficits    [x] Functional mobility  [x]ADLs  [x] Strength               []Cognition    [x] Functional transfers   [x] IADLs         [x] Safety Awareness   [x]Endurance    [] Fine Coordination              [x] Balance      [] Vision/perception   []Sensation     []Gross Motor Coordination  [] ROM  [] Delirium                   [] Motor Control     OT PLAN OF CARE   OT POC based on physician orders, patient diagnosis and results of clinical assessment    Frequency/Duration 2-5 days/wk for 2 weeks BID PRN     Specific OT Treatment Interventions to include:   * Instruction/training on adapted ADL techniques and AE recommendations to increase functional independence within precautions       * Training on energy conservation strategies, correct breathing pattern and techniques to improve independence/tolerance for self-care routine  * Functional transfer/mobility training/DME recommendations for increased independence, safety, and fall prevention  * Patient/Family education to increase follow through with safety techniques and functional independence  * Recommendation of environmental modifications for increased safety with functional transfers/mobility and ADLs  * Therapeutic exercise to improve motor endurance, ROM, and functional strength for ADLs/functional transfers  * Therapeutic activities to facilitate/challenge dynamic balance, stand tolerance for increased safety and independence with ADLs    Recommended Adaptive Equipment: wheeled walker, 3:1 commode, shower chair     Home Living: Lives alone, single family home, 1 story, 3 steps to enter with bilateral rail. Patient plans to stay at her daughters upon discharge which is a 1 story home, 1+1 step to main level. Bathroom set-up: Walk-in shower at both patient and daughter's home. Equipment owned: wheeled walker, 3:1 commode, patient believes she has a shower chair in her basement. Prior Level of Function: Independent with ADLs , Independent with IADLs; ambulated independently. Driving: Yes  Enjoys: Reading     Pain Level: Patient denied pain. Cognition: A&O: 4/4; Follows 2-3 step directions   Memory: intact   Sequencing: fair    Problem solving: fair    Judgement/safety: fair     [facility]   AM-PAC Daily Activity Inpatient   How much help for putting on and taking off regular lower body clothing?: A Lot  How much help for Bathing?: A Lot  How much help for Toileting?: A Little  How much help for putting on and taking off regular upper body clothing?: A Little  How much help for taking care of personal grooming?: A Little  How much help for eating meals?: A Little  AM-[facility] Inpatient Daily Activity Raw Score: 16  AM-PAC Inpatient ADL T-Scale Score : 35.96  ADL Inpatient CMS 0-100% Score: 53.32  ADL Inpatient CMS G-Code Modifier : CK     Functional Assessment:    Initial Eval Status  Date: 4/1/22   Treatment Status  Date: STGs = LTGs  Time frame: 10-14 days   Feeding Supervision     Independent    Grooming Stand by Assist     Moderate [place]    UB Dressing Stand by Assist    Moderate [place]    LB Dressing Dependent   For clothing mgmt without AE d/t hip precautions    Instructions/training on use of AE. Minimal/Moderate Assist to doff/don sock using reacher and sock aide. Compression stocking donned during session. Moderate [place]    Bathing Moderate Assist   Requires assist for safety. Discussed DME benefits/needs for improved safety with bathing. Moderate [place]    Toileting Minimal Assist   Simulated     Moderate [place]    Bed Mobility  Supine to sit: Not assessed   Sit to supine: Not assessed     Patient up in chair at start/end of session. Supine to sit: Independent   Sit to supine: Independent    Functional Transfers Sit to stand: Minimal Assist   Stand to sit: Minimal Assist  (Elevated) Commode Transfer: Minimal Assist     Transfer training with verbal cues for hand placement throughout session to improve safety. Moderate [place]    Functional Mobility Minimal Assist with wheeled walker to improve balance to/from bathroom, verbal cues for walker sequence and safety. Moderate [place] with use of wheeled walker   Balance Sitting:     Static: SBA    Dynamic: SBA  Standing: CGA/SBA with walker    Sitting:     Static: Indep    Dynamic: Indep  Standing: Mod I with walker   Activity Tolerance fair  plus   Increase standing tolerance >5 minutes for improved engagement with functional transfers and indep in ADLs     Visual/  Perceptual Glasses: Yes, readers/not present at bedside    Reports changes in vision since admission: No      NA      Hand Dominance: Right     AROM (PROM) Strength Additional Info:  Goal:   RUE  WFL 4-/5 good  and wfl FMC/dexterity noted during ADL tasks   Improve overall RUE strength  for participation in functional tasks   LUE WFL 4-/5 good  and wfl FMC/dexterity noted during ADL tasks   Improve overall LUE strength  for participation in functional tasks     Hearing: Fair; wears hearing aid  Sensation:   No c/o numbness or tingling  Tone:  WFL   Edema: LEs     Comments: RN cleared patient for OT. Upon arrival patient in chair. Therapist facilitated and instructed pt on adapted  techniques & compensatory strategies to improve safety and independence with basic ADLs, bed mobility, functional transfers and mobility to allow pt to achieve highest level of independence and safely.   Pt demonstrated fair plus understanding of education & follow through. At end of session, patient was in chair with call light and phone within reach, all lines and tubes intact. Overall, patient demonstrated  decreased independence and safety during completion of ADL tasks. Pt would benefit from continued skilled OT to increase safety and independence with completion of ADL tasks and functional mobility for improved quality of life. Treatment: OT treatment provided this date includes:   · Instruction, education and training on safe facilitation and adapted techniques for completion of ADLs. These include safe functional transfer techniques and energy conservation for completion of ADLs. Education provided on hand/feet placement with walker, hip precautions, joint protection, and body mechanics for fall prevention. Cues for energy conservation and safety for in the home at [place], including modifications and DME. Prior to and at the end of session, environmental modifications / line management completed for patients safety and efficiency of treatment session. See above for further details. Rehab Potential: Good for established goals. Patient / Family Goal: D/c to daughter's house      Patient and/or family were instructed on functional diagnosis, prognosis/goals and OT plan of care. Demonstrated fair plus understanding.      Eval Complexity: Low    Time In: 9:35 AM   Time Out: 10:05 AM    Total Treatment Time: 10      Min Units   OT Eval Low 97165  X  1    OT Eval Medium 57081      OT Eval High 59642      OT Re-Eval [id number]            ADL/Self Care [id number] 10 1   Therapeutic Activities 32824       Therapeutic Ex 08613       Orthotic Management 14865       Manual [id number]     Neuro Re-Ed 29006       Non-Billable Time        Evaluation Time additionally includes thorough review of current medical information, gathering information on past medical history/social history and prior level of function, interpretation of standardized testing/informal observation of tasks, assessment of data and development of plan of care and goals.         Evaluating OT: Lo Maidson OTR/L #[id number]

## 2022-04-01 NOTE — PROGRESS NOTES
Physical Therapy Treatment Note/Plan of Care    Room #:  0320/0320-01  Patient Name: Codey Elizondo  YOB: 1940  MRN: [id number]    Date of Service: 4/1/2022     Tentative placement recommendation: Home Health PT 5 days a week   Equipment recommendation: Patient has needed equipment       Evaluating Physical Therapist: Jolie Paez #[id number]     Specific Provider Orders/Date/Referring Provider :  03/31/22 1345   PT eval and treat Start: 03/31/22 1345, End: 03/31/22 1345, ONE TIME, Standing Count: 1 Occurrences, R    Anthony Kumar DO      Admitting Diagnosis:   Failed total hip arthroplasty with dislocation, sequela [T84.028S, Z96.649]  DJD (degenerative joint disease) [M19.90]   Visit diagnosis:   Visit Diagnoses       Codes    Failed total hip arthroplasty with dislocation, sequela    -  Primary T84.028S, Z96.649    History of surgery     Z98.890        Surgery:  DATE OF PROCEDURE: 3/31/2022   SURGEON: Matt Sprague   ASSISTANT: Joceline Soriano   PREOPERATIVE DIAGNOSIS: 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   POSTOPERATIVE DIAGNOSIS: Same   OPERATION: Revision left hip arthroplasty to constrained bearings         Patient Active Problem List   Diagnosis    Degenerative arthritis of hip R    Primary osteoarthritis of left knee    Essential hypertension    Familial hypercholesterolemia    Osteoporosis    Spinal stenosis of lumbar region    Lumbar radiculopathy    DDD (degenerative disc disease), lumbar    Synovial cyst of lumbar facet joint    Arthritis of right knee    DJD (degenerative joint disease)       ASSESSMENT of current deficits: Patient exhibits decreased strength, balance, endurance, range of motion and pain left hip impairing functional mobility, transfers, gait , gait distance and tolerance to activity. Patient needing cueing for decreased speed, sequencing, hip precautions, and safety during ambulation.  Pt able to perform stairs with no loss of balance. Patient requires skilled physical therapy to address concerns listed above to increase safety and independence at discharge. PHYSICAL THERAPY  PLAN OF CARE       Physical therapy plan of care is established based on physician order,  patient diagnosis and clinical assessment    Current Treatment Recommendations:    -Bed Mobility: Lower extremity exercises   -Sitting Balance: Incorporate reaching activities to activate trunk muscles   -Standing Balance: Perform strengthening exercises in standing to promote motor control with or without upper extremity support   -Transfers: Provide instruction on proper hand and foot position for adequate transfer of weight onto lower extremities and use of gait device if needed and Cues for hand placement, technique and safety. Provide stabilization to prevent fall   -Gait: Gait training, Standing activities to improve: base of support, weight shift, weight bearing  and Pregait training to emphasize: Sequencing , Increased step length , Device control, Upright and Safety   -Endurance: Utilize Supervised activities to increase level of endurance to allow for safe functional mobility including transfers and gait   -Stairs: Stair training with instruction on proper technique and hand placement on rail    PT long term treatment goals are located in below grid    Patient and or family understand(s) diagnosis, prognosis, and plan of care. Frequency of treatments: Patient will be seen  twice daily  for therapeutic exercise, functional retraining, endurance activities, balance exercises, family and patient education. Prior Level of Function: Patient ambulated independently    Rehab Potential: good    for baseline    Past medical history:   Past Medical History:   Diagnosis Date    Arthritis     right hip    Cancer ([facility] [address]     breast 1997 approx. left    Hyperlipidemia     Hypertension     Osteoarthrosis     Osteoporosis     Other chronic postoperative pain     Undiagnosed cardiac murmurs      Past Surgical History:   Procedure Laterality Date    ANESTHESIA NERVE BLOCK Left 4/30/2019    LUMBAR EPIDURAL STERIOD INJECTION L4-5  (LEFT PARAMEDIAN APPROACH) performed by Michael Johnston MD at [address] [facility] [address]      biopsied on left breast    COLONOSCOPY      EPIDURAL STEROID INJECTION Left 3/19/2019    LEFT LUMBAR TRANSFORAMINAL EPIDURAL STEROID INJECTION: L4 / L5 performed by Michael Johnston MD at [address]      right ankle    HYSTERECTOMY  1979    JOINT REPLACEMENT      L hip     JOINT REPLACEMENT  10/2013    R hip    JOINT REPLACEMENT  03/08/2016    L knee     JOINT REPLACEMENT  11/03/2020    R knee     NERVE BLOCK Left 03/19/2019    left lumbar transforaminal epidural    NERVE BLOCK Left 04/30/2019    lumbar    REVISION TOTAL HIP ARTHROPLASTY Left 3/31/2022    REVISION LEFT TOTAL HIP (DEPUY) performed by Raven Pressley DO at [address] [facility] Right Oct 2013    with cell saver    TOTAL KNEE ARTHROPLASTY Left 3/8/16    TOTAL KNEE ARTHROPLASTY Right 11/3/2020    TOTAL RIGHT KNEE REPLACEMENT/ ARTHROPLASTY (BERNABE) performed by Raven Pressley DO at [address] [facility] [address]:    Precautions:  Up as tolerated, falls, alarm and hip precautions ,left lower extremity FWB (full weight bearing)   history of dislocations     Social history: Patient lives alone in a ranch home  with 3 steps  to enter with [employer] in shower grab bars, built in shower chair    Equipment owned: [address] and 3-in-1 commode,       [address] [facility]   How much difficulty turning over in bed?: None  How much difficulty sitting down on / standing up from a chair with arms?: A Little  How much difficulty moving from lying on back to sitting on side of bed?: None  How much help from another person moving to and from a bed to a chair?: A Little  How much help from another person needed to walk in hospital room?: A Little  How much help from another person for climbing 3-5 steps with a railing?: A Little  AM-PAC Inpatient Mobility Raw Score : 20  AM-PAC Inpatient T-Scale Score : 47.67  Mobility Inpatient CMS 0-100% Score: 35.83  Mobility Inpatient CMS G-Code Modifier : [address] cleared patient for PT treatment. .     OBJECTIVE:   Initial Evaluation  Date: 3/31/2022 Treatment Date:  4/1/2022   Short Term/ Long Term   Goals   Was pt agreeable to Eval/treatment? Yes yes    Pain Level  5/10   Left hip   0/10  Left hip     Bed Mobility  Rolling: Not assessed     Supine to sit: Moderate assist of 1    Sit to supine: Not assessed     Scooting: Moderate assist of 1   Rolling: Not assessed    Supine to sit: Supervision    Sit to supine: Not assessed    Scooting: Supervision     Rolling: Independent    Supine to sit: Independent    Sit to supine: Independent    Scooting: Independent     Transfers Sit to stand: Minimal assist of 1 bed elevated Cues for hand placement and safety  Sit to stand: Minimal assist of 1 Cues for hand placement and safety       Sit to stand: Modified Independent     Ambulation    40 feet using  wheeled walker with Minimal assist of 1   Patient with flexed posture, decreased step length and step to gait pattern and cues for sequencing, upright posture and safety 2 x 120 feet using  wheeled walker with Minimal assist of 1   cues for sequencing, FWB (full weight bearing) weight bearing left lower extremity, upright posture, walker approximation, increased base of support, increased step length, safety and pacing   100 feet using  wheeled walker with Modified Independent    Stair negotiation: ascended and descended   Not assessed    14 steps using 2 rails with Supervision. Cues for sequencing and safety.    3 steps 1 rail with supervision    ROM Within functional limits except Left hip within precautions      Increase range of motion 10% of affected joints    Strength Within functional limits  except  Left lower extremity 3/5  Within functional limits   Balance Sitting EOB:  good  -  Dynamic Standing:  fair    wheeled walker  Sitting EOB: good   Dynamic Standing: fair + wheeled walker   Sitting EOB:  good    Dynamic Standing:  good wheeled walker      Patient is Alert & Oriented x person, place, time and situation and follows directions    Sensation:  Patient denies numbness/tingling  Edema:  none noted   Vitals:  Blood Pressure at rest   Blood Pressure during session     Heart Rate at rest   Heart Rate during session    SPO2 at rest  %  SPO2 during session %     Patient education  Patient educated on role of Physical Therapy, risks of immobility, safety and plan of care,  importance of mobility while in hospital , ankle pumps, quad set and glut set for edema control, blood clot prevention, hip precautions and weight bearing status       Patient response to education:   Pt verbalized understanding Pt demonstrated skill Pt requires further education in this area   Yes Partial Yes       Treatment:  Patient practiced and was instructed in the following treatment:     Therapist educated and facilitated patient on techniques to increase safety and independence with bed mobility, balance, functional transfers, and functional mobility. Instruction in hip precautions; no > 90*, crossing midline or pivoting/internal rotation    Patient performed ankle pumps, quad sets, glut sets x 15-20 each. Active assist heelslide, hip abduction/adduction, straight leg raise x 15 - 20 each    Sat edge of bed 10 minutes with Minimal assist of 1 to increase dynamic sitting balance and activity tolerance. pt stood,  ambulated in hallway, performed stair training, and ambulated in the hallway, to the restroom in her room.     At end of session, patient in the restroom with . call light and phone within reach,   all lines and tubes intact, nursing notified. Patient would benefit from continued skilled Physical Therapy to improve functional independence and quality of life. Patient's/ family goals   home      Patient and or family understand(s) diagnosis, prognosis, and plan of care. Frequency of treatments: Patient will be seen  twice daily  for therapeutic exercise, functional retraining, endurance activities, balance exercises, family and patient education. Time in 08:30  Time out 09:08    Total Treatment Time  38 minutes         CPT codes:    Therapeutic activities (80686)   26 minutes  2 unit(s)  Therapeutic exercises (30193)   12 minutes  1 unit(s)    Diallo Muhammad  [facility]  LIC # [id number]

## 2022-04-01 NOTE — PROGRESS NOTES
Department of Orthopedic Surgery  Resident Progress Note    Patient seen and examined, resting in bed. Patient is doing very well postoperative day #1. Pain controlled. No new complaints. Denies chest pain, shortness of breath, dizziness/lightheadedness. We discussed patient's surgery from yesterday. All questions/concerns were addressed. VITALS:  /63   Pulse 95   Temp 98.5 °F (36.9 °C) (Oral)   Resp 18   Ht 5' 8\" (1.727 m)   Wt 173 lb (78.5 kg)   SpO2 95%   BMI 26.30 kg/m²     General: alert and oriented to person, place and time, well-developed and well-nourished, in no acute distress    MUSCULOSKELETAL:   left lower extremity:  · Dressing C/D/I  · Compartments soft and compressible  · +PF/DF/EHL  · +2/4 DP & PT pulses, Brisk Cap refill, Toes warm and perfused  · Distal sensation grossly intact to Peroneals, Sural, Saphenous, and tibial nrs    CBC:   Lab Results   Component Value Date    WBC 11.6 04/01/2022    HGB 13.3 04/01/2022    HCT 40.6 04/01/2022     04/01/2022     PT/INR:    Lab Results   Component Value Date    PROTIME 12.0 03/22/2022    INR 1.0 03/22/2022         ASSESSMENT  · S/P revision left hip arthroplasty to constrained bearings on 3/31/2022    PLAN      · Continue physical therapy and protocol: WBAT - LLE with posterior hip precautions  · 24 hour abx coverage  · Deep venous thrombosis prophylaxis -aspirin, early mobilization  · PT/OT  · Pain Control: IV and PO. Wean to p.o. as able  · Monitor H&H. Hemoglobin 13.3 this morning  · D/C Planning-appreciate PT/OT, SW, medicine recommendations. Patient's plan is to go home with her daughter. Okay from orthopedic standpoint for discharge when deemed medically stable.   · Please call with questions or concerns  · Patient to follow-up with Dr. Batool Thomas in office

## 2022-04-01 NOTE — PROGRESS NOTES
Department of Internal Medicine  Internal Medicine Progress Note    Primary Care Physician: Mingo Thomas DO   Admitting Physician:  Raven Pressley DO  Admission date and time: 3/31/2022  8:31 AM    Room:  [address] [place] [address]  Admitting diagnosis: Failed total hip arthroplasty with dislocation, sequela [T84.028S, Z96.649]  DJD (degenerative joint disease) [M19.90]      Patient Name: Merlinda Penna  MRN: [id number]    Date of Service: 4/1/2022     Reason for consultation:  Medical management    History of present illness:    Patient is 69-year-old female who is status post left total hip arthroplasty secondary to joint dislocations in the past.  Patient currently resting in bed and states pain is well controlled. She denies any nausea or emesis. Denies any subjective fever or chills. Denies any lightheadedness or dizziness. Denies any chest pain shortness of breath. 4/1/2022  Patient seen examined on medical surgical floor. Patient has expected postop discomfort. Patient denies any chest pain, abdominal pain, nausea/vomiting, dizziness. BUN/creatinine was 60/0.6. WBC 11.6 hemoglobin 13.3 with normal liver enzymes. Fasting blood sugar 144. Temperature 98.4 with heart rate 88 blood pressure 118/62. O2 sat 95% room air at rest.  Patient states she feels good and is going to be discharged home today. PAST MEDICAL Hx:  Past Medical History:   Diagnosis Date    Arthritis     right hip    Cancer ([address]     breast 1997 approx. left    Hyperlipidemia     Hypertension     Osteoarthrosis     Osteoporosis     Other chronic postoperative pain     Undiagnosed cardiac murmurs        PAST SURGICAL Hx:   Past Surgical History:   Procedure Laterality Date    ANESTHESIA NERVE BLOCK Left 4/30/2019    LUMBAR EPIDURAL STERIOD INJECTION L4-5  (LEFT PARAMEDIAN APPROACH) performed by Michael Johnston MD at [address] [place] [address]      biopsied on left breast    COLONOSCOPY      EPIDURAL STEROID INJECTION Left 3/19/2019    LEFT LUMBAR TRANSFORAMINAL EPIDURAL STEROID INJECTION: L4 / L5 performed by Mike Knox MD at [address]      right ankle    HYSTERECTOMY  1979    JOINT REPLACEMENT      L hip     JOINT REPLACEMENT  10/2013    R hip    JOINT REPLACEMENT  03/08/2016    L knee     JOINT REPLACEMENT  11/03/2020    R knee     NERVE BLOCK Left 03/19/2019    left lumbar transforaminal epidural    NERVE BLOCK Left 04/30/2019    lumbar    TONSILLECTOMY      TOTAL HIP ARTHROPLASTY Right Oct 2013    with cell saver    TOTAL KNEE ARTHROPLASTY Left 3/8/16    TOTAL KNEE ARTHROPLASTY Right 11/3/2020    TOTAL RIGHT KNEE REPLACEMENT/ ARTHROPLASTY (BERNABE) performed by Malorie Zeng DO at [address] Hx:  Family History   Problem Relation Age of Onset    Heart Disease Mother        HOME MEDICATIONS:  Prior to Admission medications    Medication Sig Start Date End Date Taking? Authorizing Provider   aspirin 325 MG EC tablet Take 1 tablet by mouth 2 times daily 3/31/22 3/31/23 Yes Joya Munroe, DO   HYDROcodone-acetaminophen (NORCO) 5-325 MG per tablet Take 1 tablet by mouth every 4 hours as needed for Pain for up to 7 days. Intended supply: 7 days. Take lowest dose possible to manage pain 3/31/22 4/7/22 Yes Joya Munroe, DO   atorvastatin (LIPITOR) 40 MG tablet Take 40 mg by mouth at bedtime   Yes Historical Provider, MD   amLODIPine-benazepril (LOTREL) 2.5-10 MG per capsule TAKE 1 CAPSULE BY MOUTH DAILY 3/14/22   Rodger Ormond, DO   vitamin D (CHOLECALCIFEROL) 1000 UNITS TABS tablet Take 2,000 Units by mouth daily    Historical Provider, MD   Red Yeast Rice 600 MG CAPS Take 2 capsules by mouth three times a week    Historical Provider, MD   Multiple Vitamins-Minerals (CENTRUM SILVER ADULT 50+ PO) Take  by mouth daily.     Historical Provider, MD   calcium-vitamin D (OSCAL 500/200 D-3) 500-200 MG-UNIT per tablet Take 1 tablet by mouth 2 times daily. Historical Provider, MD   NONFORMULARY daily. Tunde Steven    Historical Provider, MD       ALLERGIES:  Patient has no known allergies. SOCIAL Hx:  Social History     Socioeconomic History    Marital status:      Spouse name: Not on file    Number of children: Not on file    Years of education: Not on file    Highest education level: Not on file   Occupational History    Not on file   Tobacco Use    Smoking status: Former Smoker     Packs/day: 0.25     Years: 3.00     Pack years: 0.75     Quit date: 9/10/1998     Years since quittin.5    Smokeless tobacco: Never Used    Tobacco comment: quit smoking approx 20 yrs ago   Vaping Use    Vaping Use: Never used   Substance and Sexual Activity    Alcohol use: Yes     Alcohol/week: 1.0 standard drink     Types: 1 Glasses of wine per week     Comment: occasionally    Drug use: No    Sexual activity: Not Currently   Other Topics Concern    Not on file   Social History Narrative    Not on file     Social Determinants of Health     Financial Resource Strain: Low Risk     Difficulty of Paying Living Expenses: Not hard at all   Food Insecurity: No Food Insecurity    Worried About Running Out of Food in the Last Year: Never true    Dot of Food in the Last Year: Never true   Transportation Needs: No Transportation Needs    Lack of Transportation (Medical): No    Lack of Transportation (Non-Medical): No   Physical Activity: Inactive    Days of Exercise per Week: 0 days    Minutes of Exercise per Session: 0 min   Stress: No Stress Concern Present    Feeling of Stress :  Only a little   Social Connections:     Frequency of Communication with Friends and Family: Not on file    Frequency of Social Gatherings with Friends and Family: Not on file    Attends Adventism Services: Not on file   [employer] of Clubs or Organizations: Not on file    Attends Club or Organization Meetings: Not on file   [place] Marital Status: Not on file   Intimate Partner Violence:     Fear of Current or Ex-Partner: Not on file    Emotionally Abused: Not on file    Physically Abused: Not on file    Sexually Abused: Not on file   Housing Stability:     Unable to Pay for Housing in the Last Year: Not on file    Number of [place] in the Last Year: Not on file    Unstable Housing in the Last Year: Not on file       ROS: Positive in bold  General:   Denies chills, fatigue, fever, malaise, night sweats or weight loss    Psychological:   Denies anxiety, disorientation or hallucinations    ENT:    Denies epistaxis, headaches, vertigo or visual changes    Cardiovascular:   Denies any chest pain, irregular heartbeats, or palpitations. No paroxysmal nocturnal dyspnea. Respiratory:   Denies shortness of breath, coughing, sputum production, hemoptysis, or wheezing. No orthopnea. Gastrointestinal:   Denies nausea, vomiting, diarrhea, or constipation. Denies any abdominal pain. Denies change in bowel habits or stools. Genito-Urinary:    Denies any urgency, frequency, hematuria. Voiding without difficulty. Musculoskeletal:   Denies joint pain, joint stiffness, joint swelling or muscle pain    Neurology:    Denies any headache or focal neurological deficits. No weakness or paresthesia. Derm:    Denies any rashes, ulcers, or excoriations. Denies bruising. Extremities:   Denies any lower extremity swelling or edema. PHYSICAL EXAM: Abnormal findings noted  VITALS:  Vitals:    04/01/22 0615   BP: 118/62   Pulse: 88   Resp: 16   Temp: 98.4 °F (36.9 °C)   SpO2:          CONSTITUTIONAL:    Awake, alert, cooperative, no apparent distress, and appears stated age    EYES:     EOMI, sclera clear without icterus, conjunctiva normal    ENT:    Normocephalic, atraumatic, . External ears without lesions.      NECK:    Supple, symmetrical, trachea midline,  no JVD    HEMATOLOGIC/LYMPHATICS:    No cervical lymphadenopathy and no supraclavicular lymphadenopathy    LUNGS:    Symmetric. No increased work of breathing, good air exchange, clear to auscultation bilaterally, no wheezes, rhonchi, or rales,     CARDIOVASCULAR:    Normal apical impulse, regular rate and rhythm, normal S1 and S2, no S3 or S4, and no murmur noted    ABDOMEN:     soft, non-distended, non-tender    MUSCULOSKELETAL:    There is no redness, warmth, or swelling of the joints. NEUROLOGIC:    Awake, alert, oriented to name, place and time. SKIN:    No bruising or bleeding. No redness, warmth, or swelling    EXTREMITIES:    Peripheral pulses present. No edema, cyanosis, or swelling. Patient has bilateral lower extremity edema  Patient has dressing to left hip which is intact without signs of infection/bleeding    LINES/CATHETERS     LABORATORY DATA:  CBC with Differential:    Lab Results   Component Value Date    WBC 11.6 04/01/2022    RBC 4.49 04/01/2022    HGB 13.3 04/01/2022    HCT 40.6 04/01/2022     04/01/2022    MCV 90.4 04/01/2022    MCH 29.6 04/01/2022    MCHC 32.8 04/01/2022    RDW 13.2 04/01/2022    SEGSPCT 56 12/20/2012    LYMPHOPCT 7.7 04/01/2022    MONOPCT 6.9 04/01/2022    BASOPCT 0.1 04/01/2022    MONOSABS 0.80 04/01/2022    LYMPHSABS 0.90 04/01/2022    EOSABS 0.00 04/01/2022    BASOSABS 0.01 04/01/2022     CMP:    Lab Results   Component Value Date     04/01/2022    K 4.5 04/01/2022    K 4.4 03/22/2022     04/01/2022    CO2 24 04/01/2022    BUN 16 04/01/2022    CREATININE 0.6 04/01/2022    GFRAA >60 04/01/2022    LABGLOM >60 04/01/2022    GLUCOSE 144 04/01/2022    GLUCOSE 87 01/30/2012    PROT 6.3 04/01/2022    LABALBU 3.7 04/01/2022    LABALBU 4.6 01/30/2012    CALCIUM 9.1 04/01/2022    BILITOT 0.4 04/01/2022    ALKPHOS 36 04/01/2022    AST 21 04/01/2022    ALT 14 04/01/2022       ASSESSMENT/PLAN:  1. Revision left total hip arthroplasty   2. Chronic diastolic congestive heart failure  3.  History of left sided breast cancer  4. Hyperlipidemia  5. Hypertension  6. Osteoporosis  7. History of tobacco abuse    Patient is status post revision left total hip arthroplasty per orthopedic surgery. Pain control, activity, diet per orthopedic surgery. Home medications to be resumed as appropriate. Routine blood work ordered. Home medications reviewed   Patient is to be discharged home today and will continue current home medication.     Patient follow-up primary care physician in 1 week or as directed    Robert Madison DO  9:59 AM  4/1/2022

## 2022-04-01 NOTE — H&P
History and Physical      CHIEF COMPLAINT: Left hip discomfort and recurrent dislocations of total hip. HISTORY OF PRESENT ILLNESS:      Patient had hip replacement many years ago. She had no problems with it. There were 2 occasions of dislocation in the recent past.  There was pain associated with this and Leslie Mercer wants no more suture experiences with that and after thorough discussion we agreed that revision to constrained bearings would be in her best interest.    Past Medical History:        Diagnosis Date    Arthritis     right hip    Cancer ([address]     breast 1997 approx. left    Hyperlipidemia     Hypertension     Osteoarthrosis     Osteoporosis     Other chronic postoperative pain     Undiagnosed cardiac murmurs      Past Surgical History:        Procedure Laterality Date    ANESTHESIA NERVE BLOCK Left 4/30/2019    LUMBAR EPIDURAL STERIOD INJECTION L4-5  (LEFT PARAMEDIAN APPROACH) performed by Martha Cardozo MD at [place] [address]      biopsied on left breast    COLONOSCOPY      EPIDURAL STEROID INJECTION Left 3/19/2019    LEFT LUMBAR TRANSFORAMINAL EPIDURAL STEROID INJECTION: L4 / L5 performed by Martha Cardozo MD at [address]      right ankle    HYSTERECTOMY  1979    JOINT REPLACEMENT      L hip     JOINT REPLACEMENT  10/2013    R hip    JOINT REPLACEMENT  03/08/2016    L knee     JOINT REPLACEMENT  11/03/2020    R knee     NERVE BLOCK Left 03/19/2019    left lumbar transforaminal epidural    NERVE BLOCK Left 04/30/2019    lumbar    REVISION TOTAL HIP ARTHROPLASTY Left 3/31/2022    REVISION LEFT TOTAL HIP (DEPUY) performed by Melisa Zapata DO at [address] Right Oct 2013    with cell saver    TOTAL KNEE ARTHROPLASTY Left 3/8/16    TOTAL KNEE ARTHROPLASTY Right 11/3/2020    TOTAL RIGHT KNEE REPLACEMENT/ ARTHROPLASTY (BERNABE) performed by Valencia Troy DO at [place] [address] History:    TOBACCO:   reports that she quit smoking about 23 years ago. She has a 0.75 pack-year smoking history. She has never used smokeless tobacco.  ETOH:   reports current alcohol use of about 1.0 standard drink of alcohol per week. DRUGS:   reports no history of drug use. Family History:       Problem Relation Age of Onset    Heart Disease Mother      Medications Prior to Admission:  Medications Prior to Admission: atorvastatin (LIPITOR) 40 MG tablet, Take 40 mg by mouth at bedtime  [DISCONTINUED] atorvastatin (LIPITOR) 40 MG tablet, TAKE 1 TABLET BY MOUTH ONCE DAILY ON MONDAY, WEDNESDAY, AND FRIDAY  [DISCONTINUED] amLODIPine-benazepril (LOTREL) 5-10 MG per capsule, Take 1 capsule by mouth daily  amLODIPine-benazepril (LOTREL) 2.5-10 MG per capsule, TAKE 1 CAPSULE BY MOUTH DAILY  [DISCONTINUED] aspirin 81 MG EC tablet, Take 81 mg by mouth daily  vitamin D (CHOLECALCIFEROL) 1000 UNITS TABS tablet, Take 2,000 Units by mouth daily  Red Yeast Rice 600 MG CAPS, Take 2 capsules by mouth three times a week  Multiple Vitamins-Minerals (CENTRUM SILVER ADULT 50+ PO), Take  by mouth daily. calcium-vitamin D (OSCAL 500/200 D-3) 500-200 MG-UNIT per tablet, Take 1 tablet by mouth 2 times daily. NONFORMULARY, daily. Tunde Red  Allergies:  Patient has no known allergies. CONSTITUTIONAL:  negative for  chills and anorexia  HEENT:  negative for  tinnitus  RESPIRATORY:  negative for  dyspnea and cyanosis  CARDIOVASCULAR:  negative for  palpitations, syncope  GASTROINTESTINAL:  negative for vomiting and hematemesis  GENITOURINARY:  negative for hematuria  ENDOCRINE:  negative for tremor  MUSCULOSKELETAL:  , Range of motion in the left hip is good with generally good comfort though during the episodes of dislocation there was significant discomfort.   NEUROLOGICAL:  negative for seizures and syncope,    BEHAVIOR/PSYCH:  negative for agitated and anxiety    PHYSICAL EXAM:  /61   Pulse 83   Temp 97.9 °F (36.6 °C) (Oral)   Resp 16   Ht 5' 8\" (1.727 m)   Wt 173 lb (78.5 kg)   SpO2 96%   BMI 26.30 kg/m²   General appearance:  awake, alert, cooperative, no apparent distress, and appears stated age  Neurologic:  Awake, alert, oriented to name, place and time. Cranial nerves II-XII are grossly intact. Motor is 5 out of 5 bilaterally. Cerebellar finger to nose, heel to shin intact. Sensory is intact. Babinski down going, Romberg negative, and gait is normal.  Lungs:  No increased work of breathing, good air exchange, clear to auscultation bilaterally, no crackles or wheezing  Heart:  Normal apical impulse, regular rate and rhythm, normal S1 and S2, no S3 or S4, and no murmur noted  Abdomen:  normal bowel sounds  Skin: warm and dry, no rash or erythema  ENT: tympanic membrane, external ear and ear canal normal bilaterally, oropharynx clear and moist with normal mucous membranes  Musculoskeletal: , There are some guarding range of motion of the left hip. There is little apprehension for dislocation. Within a reasonable arc there is little discomfort and no gross instability. Neurovascular function is good the lower extremity. Gait is pretty good as well. General Labs:  CBC:   Lab Results   Component Value Date    WBC 11.6 04/01/2022    RBC 4.49 04/01/2022    HGB 13.3 04/01/2022    HCT 40.6 04/01/2022    MCV 90.4 04/01/2022    RDW 13.2 04/01/2022     04/01/2022     CMP:    Lab Results   Component Value Date     04/01/2022    K 4.5 04/01/2022    K 4.4 03/22/2022     04/01/2022    CO2 24 04/01/2022    BUN 16 04/01/2022    PROT 6.3 04/01/2022     U/A:  No components found for: Eugune Shames, USPGRAV, UPH, Chip Leonel, UKETONE, UBILI, UBLOOD, Tray, UUROBIL, [place], [facility], [place], [facility], [place], [facility], [place]    Radiology: Stable intraosseous placement of hip replacement components.   No gross evidence of instability or bony abnormalities on plain films. ASSESSMENT AND PLAN:    Revision of both head and acetabular bearing to constrained components left hip.       Electronically signed by Oswaldo Ball DO on 4/1/2022 at 12:21 PM

## 2022-04-03 LAB — CULTURE SURGICAL: NORMAL

## 2022-04-03 NOTE — DISCHARGE SUMMARY
Physician Discharge Summary     Patient ID:  Codey Elizondo  [id number]  66 y.o.  1940    Admit date: 3/31/2022    Discharge date and time: 4/1/2022  2:16 PM     Admitting Physician: Henry Blake DO     Discharge Physician: Henry Blake DO    Admission Diagnoses: Failed total hip arthroplasty with dislocation, sequela [T84.028S, Z96.649]  DJD (degenerative joint disease) [M19.90]    Discharge Diagnoses: s/p left total hip replacement arthroplasty with late dislocation    Admission Condition: stable    Discharged Condition: stable    Hospital Course: The patient was admitted from the pre-operative holding area and underwent an uneventful course of Revision left hip arthroplasty to constrained bearings on 3/31/2022 by Henry Blake DO. The patient was subsequently taken to the PACU and to the floor in stable condition. The patient continued antibiotics 24 hours postoperatively, as they received a dose of antibiotics preoperatively for infection prophylaxis. The patient was to begin physical therapy, WBAT, the day of surgery. The patient was also started on DVT prophylaxis. Blood counts were followed daily. Pain medications were transitioned to oral medication.  was consulted for D/C planning as the patient made appropriate gains with PT in regaining independent function. On POD 1, the patient was discharged to home in stable condition. Treatments: s/p Revision left hip arthroplasty to constrained bearings    Disposition: The patient was provided instructions to follow up with CHI Olson DO in 3 weeks and to call the office for an appointment. staples (s) were to be removed in 2 weeks. Pt was to continue DVT prophylaxis as directed. Medication List      START taking these medications    HYDROcodone-acetaminophen 5-325 MG per tablet  Commonly known as: Norco  Take 1 tablet by mouth every 4 hours as needed for Pain for up to 7 days. Intended supply: 7 days.  Take lowest dose possible to manage pain        CHANGE how you take these medications    aspirin 325 MG EC tablet  Take 1 tablet by mouth 2 times daily  What changed:   · medication strength  · how much to take  · when to take this        CONTINUE taking these medications    amLODIPine-benazepril 2.5-10 MG per capsule  Commonly known as: LOTREL  TAKE 1 CAPSULE BY MOUTH DAILY     atorvastatin 40 MG tablet  Commonly known as: LIPITOR     CENTRUM SILVER ADULT 50+ PO     NONFORMULARY     Oscal 500/200 D-3 500-200 MG-UNIT per tablet  Generic drug: calcium-vitamin D     Red Yeast Rice 600 MG Caps     vitamin D 25 MCG (1000 UT) Tabs tablet  Commonly known as: CHOLECALCIFEROL           Where to Get Your Medications      You can get these medications from any pharmacy    Bring a paper prescription for each of these medications  · aspirin 325 MG EC tablet  · HYDROcodone-acetaminophen 5-325 MG per tablet           Signed:  Stephen Bear DO  4/3/2022  7:09 AM

## 2022-04-05 LAB
ANAEROBIC CULTURE: NORMAL
ANAEROBIC CULTURE: NORMAL
BODY FLUID CULTURE, STERILE: NORMAL
GRAM STAIN RESULT: NORMAL

## 2022-04-06 NOTE — PROGRESS NOTES
CLINICAL PHARMACY NOTE: MEDS TO BEDS    Total # of Prescriptions Filled: 1   The following medications were delivered to the patient:  · Hydrocodone 5-325 mg    Additional Documentation:

## 2022-04-08 DIAGNOSIS — Z96.649 STATUS POST REVISION OF TOTAL HIP REPLACEMENT: Primary | ICD-10-CM

## 2022-04-22 ENCOUNTER — OFFICE VISIT (OUTPATIENT)
Dept: ORTHOPEDIC SURGERY | Age: 82
End: 2022-04-22

## 2022-04-22 VITALS — WEIGHT: 170 LBS | TEMPERATURE: 98 F | BODY MASS INDEX: 25.76 KG/M2 | HEIGHT: 68 IN

## 2022-04-22 DIAGNOSIS — Z96.649 STATUS POST REVISION OF TOTAL HIP REPLACEMENT: Primary | ICD-10-CM

## 2022-04-22 PROCEDURE — 99024 POSTOP FOLLOW-UP VISIT: CPT | Performed by: ORTHOPAEDIC SURGERY

## 2022-04-22 NOTE — PROGRESS NOTES
Chief Complaint:   Chief Complaint   Patient presents with    Hip Pain     f/u left ROLF DOS: 3/31/22       Haseeb Lucas follows up 3 weeks postop revision of bearings to constrained bearings right total hip. She is doing very well. She is walking pretty well and not having much discomfort. Allergies; medications; past medical, surgical, family, and social history; and problem list have been reviewed today and updated as indicated in this encounter seen below. Exam: Wound is nicely healed. Asha Lehman is able to get up out of a chair by herself. Gait favors right lower extremity little bit yet. He makes good progress walking    Radiographs: Postoperative images were shared. Described the nature of the parts that we revised. ASSESSMENT:    Mushtaq Arreola was seen today for hip pain. Diagnoses and all orders for this visit:    Status post revision of total hip replacement        PLAN: Careful activity. She should avoid extremes but should not have problems with instability of this joint with a constrained bearings. Return in about 6 weeks (around 6/3/2022). Current Outpatient Medications   Medication Sig Dispense Refill    aspirin 325 MG EC tablet Take 1 tablet by mouth 2 times daily 56 tablet 1    atorvastatin (LIPITOR) 40 MG tablet Take 40 mg by mouth at bedtime      amLODIPine-benazepril (LOTREL) 2.5-10 MG per capsule TAKE 1 CAPSULE BY MOUTH DAILY 30 capsule 0    vitamin D (CHOLECALCIFEROL) 1000 UNITS TABS tablet Take 2,000 Units by mouth daily      Red Yeast Rice 600 MG CAPS Take 2 capsules by mouth three times a week      Multiple Vitamins-Minerals (CENTRUM SILVER ADULT 50+ PO) Take  by mouth daily.  calcium-vitamin D (OSCAL 500/200 D-3) 500-200 MG-UNIT per tablet Take 1 tablet by mouth 2 times daily.  NONFORMULARY daily. Tunde Red       No current facility-administered medications for this visit.        Patient Active Problem List   Diagnosis    Degenerative arthritis of hip R  Primary osteoarthritis of left knee    Essential hypertension    Familial hypercholesterolemia    Osteoporosis    Spinal stenosis of lumbar region    Lumbar radiculopathy    DDD (degenerative disc disease), lumbar    Synovial cyst of lumbar facet joint    Arthritis of right knee    DJD (degenerative joint disease)       Past Medical History:   Diagnosis Date    Arthritis     right hip    Cancer ([address]     breast 1997 approx. left    Hyperlipidemia     Hypertension     Osteoarthrosis     Osteoporosis     Other chronic postoperative pain     Undiagnosed cardiac murmurs        Past Surgical History:   Procedure Laterality Date    ANESTHESIA NERVE BLOCK Left 4/30/2019    LUMBAR EPIDURAL STERIOD INJECTION L4-5  (LEFT PARAMEDIAN APPROACH) performed by Singh Dee MD at [place] [address]      biopsied on left breast    COLONOSCOPY      EPIDURAL STEROID INJECTION Left 3/19/2019    LEFT LUMBAR TRANSFORAMINAL EPIDURAL STEROID INJECTION: L4 / L5 performed by Singh Dee MD at [address]      right ankle    HYSTERECTOMY  1979    JOINT REPLACEMENT      L hip     JOINT REPLACEMENT  10/2013    R hip    JOINT REPLACEMENT  03/08/2016    L knee     JOINT REPLACEMENT  11/03/2020    R knee     NERVE BLOCK Left 03/19/2019    left lumbar transforaminal epidural    NERVE BLOCK Left 04/30/2019    lumbar    REVISION TOTAL HIP ARTHROPLASTY Left 3/31/2022    REVISION LEFT TOTAL HIP ([address]) performed by Josephine Thomas DO at [address] Right Oct 2013    with cell saver    TOTAL KNEE ARTHROPLASTY Left 3/8/16    TOTAL KNEE ARTHROPLASTY Right 11/3/2020    TOTAL RIGHT KNEE REPLACEMENT/ ARTHROPLASTY (BERNABE) performed by Josephine Thomas DO at [address]       No Known Allergies    Social History     Socioeconomic History    Marital status:       Spouse name: None    Number of children: None    Years of education: None    Highest education level: None   Occupational History    None   Tobacco Use    Smoking status: Former Smoker     Packs/day: 0.25     Years: 3.00     Pack years: 0.75     Quit date: 9/10/1998     Years since quittin.6    Smokeless tobacco: Never Used    Tobacco comment: quit smoking approx 20 yrs ago   Vaping Use    Vaping Use: Never used   Substance and Sexual Activity    Alcohol use: Yes     Alcohol/week: 1.0 standard drink     Types: 1 Glasses of wine per week     Comment: occasionally    Drug use: No    Sexual activity: Not Currently   Other Topics Concern    None   Social History Narrative    None     Social Determinants of Health     Financial Resource Strain: Low Risk     Difficulty of Paying Living Expenses: Not hard at all   Food Insecurity: No Food Insecurity    Worried About Running Out of Food in the Last Year: Never true    Dot of Food in the Last Year: Never true   Transportation Needs: No Transportation Needs    Lack of Transportation (Medical): No    Lack of Transportation (Non-Medical): No   Physical Activity: Inactive    Days of Exercise per Week: 0 days    Minutes of Exercise per Session: 0 min   Stress: No Stress Concern Present    Feeling of Stress :  Only a little   Social Connections:     Frequency of Communication with Friends and Family: Not on file    Frequency of Social Gatherings with Friends and Family: Not on file    Attends Hindu Services: Not on file    Active Member of Clubs or Organizations: Not on file    Attends Club or Organization Meetings: Not on file    Marital Status: Not on file   Intimate Partner Violence:     Fear of Current or Ex-Partner: Not on file    Emotionally Abused: Not on file    Physically Abused: Not on file    Sexually Abused: Not on file   Housing Stability:     Unable to Pay for Housing in the Last Year: Not on file    Number of [facility] in the Last Year: Not on file    Unstable Housing in the Last Year: Not on file       Review of Systems  As follows except as previously noted in HPI:  Constitutional: Negative for chills, diaphoresis, fatigue, fever and unexpected weight change. Respiratory: Negative for cough, shortness of breath and wheezing. Cardiovascular: Negative for chest pain and palpitations. Neurological: Negative for dizziness, syncope, cephalgia. GI / : negative  Musculoskeletal: see HPI       Objective:   Physical Exam   Constitutional: Oriented to person, place, and time. and appears well-developed and well-nourished. :   Head: Normocephalic and atraumatic. Eyes: EOM are normal.   Neck: Neck supple. Cardiovascular: Normal rate and regular rhythm. Pulmonary/Chest: Effort normal. No stridor. No respiratory distress, no wheezes. Abdominal:  No abnormal distension. Neurological: Alert and oriented to person, place, and time. Skin: Skin is warm and dry. Psychiatric: Normal mood and affect.  Behavior is normal. Thought content normal.    CHI Ibarra DO    4/22/22  10:44 AM

## 2022-05-02 LAB
FUNGUS (MYCOLOGY) CULTURE: NORMAL
FUNGUS (MYCOLOGY) CULTURE: NORMAL
FUNGUS STAIN: NORMAL
FUNGUS STAIN: NORMAL

## 2022-05-17 LAB
AFB CULTURE (MYCOBACTERIA): NORMAL
AFB CULTURE (MYCOBACTERIA): NORMAL
AFB SMEAR: NORMAL
AFB SMEAR: NORMAL

## 2022-06-03 ENCOUNTER — OFFICE VISIT (OUTPATIENT)
Dept: ORTHOPEDIC SURGERY | Age: 82
End: 2022-06-03

## 2022-06-03 VITALS — TEMPERATURE: 98 F | BODY MASS INDEX: 25.76 KG/M2 | WEIGHT: 170 LBS | HEIGHT: 68 IN

## 2022-06-03 DIAGNOSIS — Z96.649 STATUS POST REVISION OF TOTAL HIP REPLACEMENT: Primary | ICD-10-CM

## 2022-06-03 PROCEDURE — 99024 POSTOP FOLLOW-UP VISIT: CPT | Performed by: ORTHOPAEDIC SURGERY

## 2022-06-03 NOTE — PROGRESS NOTES
Chief Complaint:   Chief Complaint   Patient presents with    Hip Pain     Left ROLF Revision DOS 03/31/2022, states of low back pain. Demarcus Crawford about 9 weeks post revision of her left hip bearings. She is doing very well. The hips not causing her any problems. She is walking pretty well. She has had some recent onset of low back discomfort. She has taken pain pills for that and it does help she is also takes some anti-inflammatories. Allergies; medications; past medical, surgical, family, and social history; and problem list have been reviewed today and updated as indicated in this encounter seen below. Exam: Jose Hooker walks with a slow deliberate gait but is well-balanced. There is no apparent pain in the left hip area. The hip is stable. Her calf is supple with no tenderness. Walks with a little forward stooped gait as is usual for her. She also has an exaggerated kyphosis. Radiographs: None    ASSESSMENT:    Sia Cerna was seen today for hip pain. Diagnoses and all orders for this visit:    Status post revision of total hip replacement        PLAN: Tinea with anti-inflammatories. She has an appointment with her primary care doctor very soon here and if she continues to have back pain should mention that to him should he want to refer her for evaluation. Follow-up with regards to her hip in about 8 weeks    Return in about 8 weeks (around 7/29/2022).        Current Outpatient Medications   Medication Sig Dispense Refill    aspirin 325 MG EC tablet Take 1 tablet by mouth 2 times daily 56 tablet 1    atorvastatin (LIPITOR) 40 MG tablet Take 40 mg by mouth at bedtime      amLODIPine-benazepril (LOTREL) 2.5-10 MG per capsule TAKE 1 CAPSULE BY MOUTH DAILY 30 capsule 0    vitamin D (CHOLECALCIFEROL) 1000 UNITS TABS tablet Take 2,000 Units by mouth daily      Red Yeast Rice 600 MG CAPS Take 2 capsules by mouth three times a week      Multiple Vitamins-Minerals (CENTRUM SILVER ADULT 50+ PO) Take  by mouth daily.  calcium-vitamin D (OSCAL 500/200 D-3) 500-200 MG-UNIT per tablet Take 1 tablet by mouth 2 times daily.  NONFORMULARY daily. Tunde Red       No current facility-administered medications for this visit. Patient Active Problem List   Diagnosis    Degenerative arthritis of hip R    Primary osteoarthritis of left knee    Essential hypertension    Familial hypercholesterolemia    Osteoporosis    Spinal stenosis of lumbar region    Lumbar radiculopathy    DDD (degenerative disc disease), lumbar    Synovial cyst of lumbar facet joint    Arthritis of right knee    DJD (degenerative joint disease)       Past Medical History:   Diagnosis Date    Arthritis     right hip    Cancer ([address]     breast 1997 approx. left    Hyperlipidemia     Hypertension     Osteoarthrosis     Osteoporosis     Other chronic postoperative pain     Undiagnosed cardiac murmurs        Past Surgical History:   Procedure Laterality Date    ANESTHESIA NERVE BLOCK Left 4/30/2019    LUMBAR EPIDURAL STERIOD INJECTION L4-5  (LEFT PARAMEDIAN APPROACH) performed by Марина Garcia MD at [place] [address]      biopsied on left breast    COLONOSCOPY      EPIDURAL STEROID INJECTION Left 3/19/2019    LEFT LUMBAR TRANSFORAMINAL EPIDURAL STEROID INJECTION: L4 / L5 performed by Марина Garcia MD at [address]      right ankle    HYSTERECTOMY  1979    JOINT REPLACEMENT      L hip     JOINT REPLACEMENT  10/2013    R hip    JOINT REPLACEMENT  03/08/2016    L knee     JOINT REPLACEMENT  11/03/2020    R knee     NERVE BLOCK Left 03/19/2019    left lumbar transforaminal epidural    NERVE BLOCK Left 04/30/2019    lumbar    REVISION TOTAL HIP ARTHROPLASTY Left 3/31/2022    REVISION LEFT TOTAL HIP (DEPUY) performed by Maximo Meckel, DO at [address] [place] [address] Right Oct 2013    with cell saver    TOTAL KNEE ARTHROPLASTY Left 3/8/16    TOTAL KNEE ARTHROPLASTY Right 11/3/2020    TOTAL RIGHT KNEE REPLACEMENT/ ARTHROPLASTY (BERNABE) performed by Melisa Zapata DO at [address]       No Known Allergies    Social History     Socioeconomic History    Marital status:      Spouse name: None    Number of children: None    Years of education: None    Highest education level: None   Occupational History    None   Tobacco Use    Smoking status: Former Smoker     Packs/day: 0.25     Years: 3.00     Pack years: 0.75     Quit date: 9/10/1998     Years since quittin.7    Smokeless tobacco: Never Used    Tobacco comment: quit smoking approx 20 yrs ago   Vaping Use    Vaping Use: Never used   Substance and Sexual Activity    Alcohol use: Yes     Alcohol/week: 1.0 standard drink     Types: 1 Glasses of wine per week     Comment: occasionally    Drug use: No    Sexual activity: Not Currently   Other Topics Concern    None   Social History Narrative    None     Social Determinants of Health     Financial Resource Strain: Low Risk     Difficulty of Paying Living Expenses: Not hard at all   Food Insecurity: No Food Insecurity    Worried About Running Out of Food in the Last Year: Never true    Dot of Food in the Last Year: Never true   Transportation Needs: No Transportation Needs    Lack of Transportation (Medical): No    Lack of Transportation (Non-Medical): No   Physical Activity: Inactive    Days of Exercise per Week: 0 days    Minutes of Exercise per Session: 0 min   Stress: No Stress Concern Present    Feeling of Stress :  Only a little   Social Connections:     Frequency of Communication with Friends and Family: Not on file    Frequency of Social Gatherings with Friends and Family: Not on file    Attends Mosque Services: Not on file   [employer] of Clubs or Organizations: Not on file    Attends Club or Organization Meetings: Not on file  Marital Status: Not on file   Intimate Partner Violence:     Fear of Current or Ex-Partner: Not on file    Emotionally Abused: Not on file    Physically Abused: Not on file    Sexually Abused: Not on file   Housing Stability:     Unable to Pay for Housing in the Last Year: Not on file    Number of [place] in the Last Year: Not on file    Unstable Housing in the Last Year: Not on file       Review of Systems  As follows except as previously noted in HPI:  Constitutional: Negative for chills, diaphoresis, fatigue, fever and unexpected weight change. Respiratory: Negative for cough, shortness of breath and wheezing. Cardiovascular: Negative for chest pain and palpitations. Neurological: Negative for dizziness, syncope, cephalgia. GI / : negative  Musculoskeletal: see HPI       Objective:   Physical Exam   Constitutional: Oriented to person, place, and time. and appears well-developed and well-nourished. :   Head: Normocephalic and atraumatic. Eyes: EOM are normal.   Neck: Neck supple. Cardiovascular: Normal rate and regular rhythm. Pulmonary/Chest: Effort normal. No stridor. No respiratory distress, no wheezes. Abdominal:  No abnormal distension. Neurological: Alert and oriented to person, place, and time. Skin: Skin is warm and dry. Psychiatric: Normal mood and affect.  Behavior is normal. Thought content normal.    CHI Pereira DO    6/3/22  8:08 AM normal rate and rhythm, no chest pain and no edema.

## 2022-06-10 ENCOUNTER — HOSPITAL ENCOUNTER (OUTPATIENT)
Dept: GENERAL RADIOLOGY | Age: 82
Discharge: HOME | End: 2022-06-12
Payer: MEDICARE

## 2022-06-10 ENCOUNTER — HOSPITAL ENCOUNTER (OUTPATIENT)
Age: 82
Discharge: HOME | End: 2022-06-12
Payer: MEDICARE

## 2022-06-10 DIAGNOSIS — M54.50 ACUTE RIGHT-SIDED LOW BACK PAIN WITHOUT SCIATICA: ICD-10-CM

## 2022-06-10 PROCEDURE — 72100 X-RAY EXAM L-S SPINE 2/3 VWS: CPT

## 2022-06-16 ENCOUNTER — HOSPITAL ENCOUNTER (OUTPATIENT)
Dept: MRI IMAGING | Age: 82
Discharge: HOME | End: 2022-06-18
Payer: MEDICARE

## 2022-06-16 DIAGNOSIS — S32.020A COMPRESSION FRACTURE OF L2 VERTEBRA, INITIAL ENCOUNTER (HCC): ICD-10-CM

## 2022-06-16 PROCEDURE — 72148 MRI LUMBAR SPINE W/O DYE: CPT

## 2022-07-01 ENCOUNTER — HOSPITAL ENCOUNTER (OUTPATIENT)
Age: 82
Discharge: HOME | End: 2022-07-03

## 2022-07-01 PROCEDURE — 88311 DECALCIFY TISSUE: CPT

## 2022-07-01 PROCEDURE — 88307 TISSUE EXAM BY PATHOLOGIST: CPT

## 2022-07-29 ENCOUNTER — OFFICE VISIT (OUTPATIENT)
Dept: ORTHOPEDIC SURGERY | Age: 82
End: 2022-07-29
Payer: MEDICARE

## 2022-07-29 VITALS — TEMPERATURE: 98 F | BODY MASS INDEX: 23.64 KG/M2 | HEIGHT: 68 IN | WEIGHT: 156 LBS

## 2022-07-29 DIAGNOSIS — Z96.649 STATUS POST REVISION OF TOTAL HIP REPLACEMENT: Primary | ICD-10-CM

## 2022-07-29 PROCEDURE — 1090F PRES/ABSN URINE INCON ASSESS: CPT | Performed by: ORTHOPAEDIC SURGERY

## 2022-07-29 PROCEDURE — G8420 CALC BMI NORM PARAMETERS: HCPCS | Performed by: ORTHOPAEDIC SURGERY

## 2022-07-29 PROCEDURE — 1036F TOBACCO NON-USER: CPT | Performed by: ORTHOPAEDIC SURGERY

## 2022-07-29 PROCEDURE — 1123F ACP DISCUSS/DSCN MKR DOCD: CPT | Performed by: ORTHOPAEDIC SURGERY

## 2022-07-29 PROCEDURE — G8399 PT W/DXA RESULTS DOCUMENT: HCPCS | Performed by: ORTHOPAEDIC SURGERY

## 2022-07-29 PROCEDURE — 99213 OFFICE O/P EST LOW 20 MIN: CPT | Performed by: ORTHOPAEDIC SURGERY

## 2022-07-29 PROCEDURE — G8427 DOCREV CUR MEDS BY ELIG CLIN: HCPCS | Performed by: ORTHOPAEDIC SURGERY

## 2022-07-29 NOTE — PROGRESS NOTES
Chief Complaint:   Chief Complaint   Patient presents with    Hip Pain     Left ROLF revision Doing great. DOS 3/31/22       Arielle Cruz follows about 4 months postop revision left hip to capture bearings. She is doing very well. She said no problems and is complaining of no pain. Allergies; medications; past medical, surgical, family, and social history; and problem list have been reviewed today and updated as indicated in this encounter seen below. Exam: Gait is normal for Olamide with her forward stooped. She makes good progress and does not limp. Range of motion is limited somewhat in internal and external rotation but this is well within a useful limit for her. She has no instability and no pain. Radiographs: None    ASSESSMENT:    Gian Hoover was seen today for hip pain. Diagnoses and all orders for this visit:    Status post revision of total hip replacement        PLAN: Follow-up 2 months for an x-ray of the left hip. Return in about 2 months (around 9/29/2022) for L hip X. Current Outpatient Medications   Medication Sig Dispense Refill    polyethylene glycol (GLYCOLAX) 17 GM/SCOOP powder Take 17 g by mouth in the morning. 1530 g 1    predniSONE (DELTASONE) 5 MG tablet 1 tab 3 times a day for 2 days (after meals), then 1 tab 2 times a day. 10 tablet 0    amLODIPine-benazepril (LOTREL) 2.5-10 MG per capsule Take 1 capsule by mouth daily 30 capsule 2    aspirin 325 MG EC tablet Take 1 tablet by mouth 2 times daily 56 tablet 1    atorvastatin (LIPITOR) 40 MG tablet Take 40 mg by mouth at bedtime      vitamin D (CHOLECALCIFEROL) 1000 UNITS TABS tablet Take 2,000 Units by mouth daily      Red Yeast Rice 600 MG CAPS Take 2 capsules by mouth three times a week      Multiple Vitamins-Minerals (CENTRUM SILVER ADULT 50+ PO) Take  by mouth daily. calcium-vitamin D (OSCAL-500) 500-200 MG-UNIT per tablet Take 1 tablet by mouth 2 times daily. NONFORMULARY daily.  Tunde Red       No current facility-administered medications for this visit. Patient Active Problem List   Diagnosis    Degenerative arthritis of hip R    Primary osteoarthritis of left knee    Essential hypertension    Familial hypercholesterolemia    Osteoporosis    Spinal stenosis of lumbar region    Lumbar radiculopathy    DDD (degenerative disc disease), lumbar    Synovial cyst of lumbar facet joint    Arthritis of right knee    DJD (degenerative joint disease)       Past Medical History:   Diagnosis Date    Arthritis     right hip    Cancer ([facility] [address]     breast 1997 approx. left    Hyperlipidemia     Hypertension     Osteoarthrosis     Osteoporosis     Other chronic postoperative pain     Undiagnosed cardiac murmurs        Past Surgical History:   Procedure Laterality Date    ANESTHESIA NERVE BLOCK Left 4/30/2019    LUMBAR EPIDURAL STERIOD INJECTION L4-5  (LEFT PARAMEDIAN APPROACH) performed by Carmelina Dubois MD at [facility]      biopsied on left breast    COLONOSCOPY      EPIDURAL STEROID INJECTION Left 3/19/2019    LEFT LUMBAR TRANSFORAMINAL EPIDURAL STEROID INJECTION: L4 / L5 performed by Carmelina Dubois MD at [address]      right ankle    HYSTERECTOMY (CERVIX STATUS UNKNOWN)  1979    JOINT REPLACEMENT      L hip     JOINT REPLACEMENT  10/2013    R hip    JOINT REPLACEMENT  03/08/2016    L knee     JOINT REPLACEMENT  11/03/2020    R knee     NERVE BLOCK Left 03/19/2019    left lumbar transforaminal epidural    NERVE BLOCK Left 04/30/2019    lumbar    REVISION TOTAL HIP ARTHROPLASTY Left 3/31/2022    REVISION LEFT TOTAL HIP ([address]) performed by Shani Osborn DO at [address] [facility] Right Oct 2013    with cell saver    TOTAL KNEE ARTHROPLASTY Left 3/8/16    TOTAL KNEE ARTHROPLASTY Right 11/3/2020    TOTAL RIGHT KNEE REPLACEMENT/ ARTHROPLASTY (BERNABE) performed by Shani Osborn DO at [address]       No Known Allergies    Social History     Socioeconomic History    Marital status:      Spouse name: None    Number of children: None    Years of education: None    Highest education level: None   Tobacco Use    Smoking status: Former     Packs/day: 0.25     Years: 3.00     Pack years: 0.75     Types: Cigarettes     Quit date: 9/10/1998     Years since quittin.8    Smokeless tobacco: Never    Tobacco comments:     quit smoking approx 20 yrs ago   Vaping Use    Vaping Use: Never used   Substance and Sexual Activity    Alcohol use: Yes     Alcohol/week: 1.0 standard drink     Types: 1 Glasses of wine per week     Comment: occasionally    Drug use: No    Sexual activity: Not Currently     Social Determinants of Health     Financial Resource Strain: Low Risk     Difficulty of Paying Living Expenses: Not hard at all   Food Insecurity: No Food Insecurity    Worried About Running Out of Food in the Last Year: Never true    Ran Out of Food in the Last Year: Never true   Transportation Needs: No Transportation Needs    Lack of Transportation (Medical): No    Lack of Transportation (Non-Medical): No   Physical Activity: Inactive    Days of Exercise per Week: 0 days    Minutes of Exercise per Session: 0 min   Stress: No Stress Concern Present    Feeling of Stress : Only a little       Review of Systems  As follows except as previously noted in HPI:  Constitutional: Negative for chills, diaphoresis, fatigue, fever and unexpected weight change. Respiratory: Negative for cough, shortness of breath and wheezing. Cardiovascular: Negative for chest pain and palpitations. Neurological: Negative for dizziness, syncope, cephalgia. GI / : negative  Musculoskeletal: see HPI       Objective:   Physical Exam   Constitutional: Oriented to person, place, and time. and appears well-developed and well-nourished. :   Head: Normocephalic and atraumatic. Eyes: EOM are normal.   Neck: Neck supple. Cardiovascular: Normal rate and regular rhythm. Pulmonary/Chest: Effort normal. No stridor. No respiratory distress, no wheezes. Abdominal:  No abnormal distension. Neurological: Alert and oriented to person, place, and time. Skin: Skin is warm and dry. Psychiatric: Normal mood and affect.  Behavior is normal. Thought content normal.    CHI Maldonado,     7/29/22  8:12 AM

## 2022-08-01 ENCOUNTER — HOSPITAL ENCOUNTER (OUTPATIENT)
Dept: GENERAL RADIOLOGY | Age: 82
Discharge: HOME | End: 2022-08-03
Payer: MEDICARE

## 2022-08-01 ENCOUNTER — HOSPITAL ENCOUNTER (OUTPATIENT)
Age: 82
Discharge: HOME | End: 2022-08-03
Payer: MEDICARE

## 2022-08-01 ENCOUNTER — HOSPITAL ENCOUNTER (OUTPATIENT)
Age: 82
Discharge: HOME | End: 2022-08-01
Payer: MEDICARE

## 2022-08-01 DIAGNOSIS — R11.0 NAUSEA: ICD-10-CM

## 2022-08-01 DIAGNOSIS — K59.00 CONSTIPATION, UNSPECIFIED CONSTIPATION TYPE: ICD-10-CM

## 2022-08-01 DIAGNOSIS — E78.01 FAMILIAL HYPERCHOLESTEROLEMIA: ICD-10-CM

## 2022-08-01 DIAGNOSIS — R73.9 HYPERGLYCEMIA: ICD-10-CM

## 2022-08-01 DIAGNOSIS — D50.0 IRON DEFICIENCY ANEMIA SECONDARY TO BLOOD LOSS (CHRONIC): ICD-10-CM

## 2022-08-01 DIAGNOSIS — R63.4 WEIGHT LOSS: ICD-10-CM

## 2022-08-01 LAB
ALBUMIN SERPL-MCNC: 4.9 G/DL (ref 3.5–5.2)
ALP BLD-CCNC: 103 U/L (ref 35–104)
ALT SERPL-CCNC: 17 U/L (ref 0–32)
ANION GAP SERPL CALCULATED.3IONS-SCNC: 13 MMOL/L (ref 7–16)
AST SERPL-CCNC: 17 U/L (ref 0–31)
BILIRUB SERPL-MCNC: 0.6 MG/DL (ref 0–1.2)
BUN BLDV-MCNC: 20 MG/DL (ref 6–23)
CALCIUM SERPL-MCNC: 10.5 MG/DL (ref 8.6–10.2)
CHLORIDE BLD-SCNC: 99 MMOL/L (ref 98–107)
CHOLESTEROL, TOTAL: 149 MG/DL (ref 0–199)
CO2: 26 MMOL/L (ref 22–29)
CREAT SERPL-MCNC: 0.7 MG/DL (ref 0.5–1)
GFR AFRICAN AMERICAN: >60
GFR NON-AFRICAN AMERICAN: >60 ML/MIN/1.73
GLUCOSE BLD-MCNC: 115 MG/DL (ref 74–99)
HBA1C MFR BLD: 6.1 % (ref 4–5.6)
HCT VFR BLD CALC: 45.9 % (ref 34–48)
HDLC SERPL-MCNC: 64 MG/DL
HEMOGLOBIN: 14.5 G/DL (ref 11.5–15.5)
LDL CHOLESTEROL CALCULATED: 66 MG/DL (ref 0–99)
MCH RBC QN AUTO: 29.5 PG (ref 26–35)
MCHC RBC AUTO-ENTMCNC: 31.6 % (ref 32–34.5)
MCV RBC AUTO: 93.5 FL (ref 80–99.9)
PDW BLD-RTO: 13.1 FL (ref 11.5–15)
PLATELET # BLD: 243 E9/L (ref 130–450)
PMV BLD AUTO: 10.4 FL (ref 7–12)
POTASSIUM SERPL-SCNC: 4.4 MMOL/L (ref 3.5–5)
RBC # BLD: 4.91 E12/L (ref 3.5–5.5)
SODIUM BLD-SCNC: 138 MMOL/L (ref 132–146)
TOTAL PROTEIN: 7.5 G/DL (ref 6.4–8.3)
TRIGL SERPL-MCNC: 95 MG/DL (ref 0–149)
VLDLC SERPL CALC-MCNC: 19 MG/DL
WBC # BLD: 8.3 E9/L (ref 4.5–11.5)

## 2022-08-01 PROCEDURE — 85027 COMPLETE CBC AUTOMATED: CPT

## 2022-08-01 PROCEDURE — 80053 COMPREHEN METABOLIC PANEL: CPT

## 2022-08-01 PROCEDURE — 83036 HEMOGLOBIN GLYCOSYLATED A1C: CPT

## 2022-08-01 PROCEDURE — 74018 RADEX ABDOMEN 1 VIEW: CPT

## 2022-08-01 PROCEDURE — 80061 LIPID PANEL: CPT

## 2022-08-01 PROCEDURE — 36415 COLL VENOUS BLD VENIPUNCTURE: CPT

## 2022-08-25 ENCOUNTER — HOSPITAL ENCOUNTER (OUTPATIENT)
Dept: CT IMAGING | Age: 82
Discharge: HOME | End: 2022-08-25
Payer: MEDICARE

## 2022-08-25 DIAGNOSIS — J47.1 BRONCHIECTASIS WITH (ACUTE) EXACERBATION (HCC): ICD-10-CM

## 2022-08-25 DIAGNOSIS — K43.9 VENTRAL HERNIA WITHOUT OBSTRUCTION OR GANGRENE: ICD-10-CM

## 2022-08-25 PROCEDURE — 74150 CT ABDOMEN W/O CONTRAST: CPT

## 2022-08-25 PROCEDURE — 71250 CT THORAX DX C-: CPT

## 2022-09-01 ENCOUNTER — TELEPHONE (OUTPATIENT)
Dept: SURGERY | Age: 82
End: 2022-09-01

## 2022-09-01 ENCOUNTER — PREP FOR PROCEDURE (OUTPATIENT)
Dept: BARIATRICS/WEIGHT MGMT | Age: 82
End: 2022-09-01

## 2022-09-01 ENCOUNTER — OFFICE VISIT (OUTPATIENT)
Dept: SURGERY | Age: 82
End: 2022-09-01
Payer: MEDICARE

## 2022-09-01 VITALS
HEIGHT: 68 IN | TEMPERATURE: 97.3 F | WEIGHT: 154 LBS | SYSTOLIC BLOOD PRESSURE: 129 MMHG | DIASTOLIC BLOOD PRESSURE: 75 MMHG | BODY MASS INDEX: 23.34 KG/M2 | HEART RATE: 98 BPM

## 2022-09-01 DIAGNOSIS — K44.9 HIATAL HERNIA: Primary | ICD-10-CM

## 2022-09-01 PROCEDURE — 1090F PRES/ABSN URINE INCON ASSESS: CPT | Performed by: SURGERY

## 2022-09-01 PROCEDURE — G8420 CALC BMI NORM PARAMETERS: HCPCS | Performed by: SURGERY

## 2022-09-01 PROCEDURE — G8427 DOCREV CUR MEDS BY ELIG CLIN: HCPCS | Performed by: SURGERY

## 2022-09-01 PROCEDURE — 1123F ACP DISCUSS/DSCN MKR DOCD: CPT | Performed by: SURGERY

## 2022-09-01 PROCEDURE — 99204 OFFICE O/P NEW MOD 45 MIN: CPT | Performed by: SURGERY

## 2022-09-01 PROCEDURE — G8399 PT W/DXA RESULTS DOCUMENT: HCPCS | Performed by: SURGERY

## 2022-09-01 PROCEDURE — 1036F TOBACCO NON-USER: CPT | Performed by: SURGERY

## 2022-09-01 RX ORDER — SODIUM CHLORIDE 9 MG/ML
INJECTION, SOLUTION INTRAVENOUS PRN
Status: CANCELLED | OUTPATIENT
Start: 2022-09-01

## 2022-09-01 RX ORDER — SODIUM CHLORIDE 0.9 % (FLUSH) 0.9 %
5-40 SYRINGE (ML) INJECTION EVERY 12 HOURS SCHEDULED
Status: CANCELLED | OUTPATIENT
Start: 2022-09-01

## 2022-09-01 RX ORDER — SODIUM CHLORIDE, SODIUM LACTATE, POTASSIUM CHLORIDE, CALCIUM CHLORIDE 600; 310; 30; 20 MG/100ML; MG/100ML; MG/100ML; MG/100ML
INJECTION, SOLUTION INTRAVENOUS CONTINUOUS
Status: CANCELLED | OUTPATIENT
Start: 2022-09-01

## 2022-09-01 RX ORDER — SODIUM CHLORIDE 0.9 % (FLUSH) 0.9 %
5-40 SYRINGE (ML) INJECTION PRN
Status: CANCELLED | OUTPATIENT
Start: 2022-09-01

## 2022-09-01 NOTE — PROGRESS NOTES
TOTAL KNEE ARTHROPLASTY Right 11/3/2020    TOTAL RIGHT KNEE REPLACEMENT/ ARTHROPLASTY (BERNABE) performed by Shira Vogel DO at [address]       Current Outpatient Medications   Medication Sig Dispense Refill    pantoprazole (PROTONIX) 40 MG tablet TAKE 1 TABLET BY MOUTH EVERY MORNING      amLODIPine-benazepril (LOTREL) 2.5-10 MG per capsule Take 1 capsule by mouth daily 30 capsule 2    aspirin 325 MG EC tablet Take 1 tablet by mouth 2 times daily 56 tablet 1    vitamin D (CHOLECALCIFEROL) 1000 UNITS TABS tablet Take 2,000 Units by mouth daily      Red Yeast Rice 600 MG CAPS Take 2 capsules by mouth three times a week      Multiple Vitamins-Minerals (CENTRUM SILVER ADULT 50+ PO) Take  by mouth daily. calcium-vitamin D (OSCAL-500) 500-200 MG-UNIT per tablet Take 1 tablet by mouth 2 times daily. NONFORMULARY daily. Tunde Red      atorvastatin (LIPITOR) 40 MG tablet Take 40 mg by mouth at bedtime (Patient not taking: Reported on 2022)       No current facility-administered medications for this visit. No Known Allergies    The patient has a family history that is negative for severe cardiovascular or respiratory issues, negative for reaction to anesthesia. Social History     Socioeconomic History    Marital status:      Spouse name: Not on file    Number of children: Not on file    Years of education: Not on file    Highest education level: Not on file   Occupational History    Not on file   Tobacco Use    Smoking status: Former     Packs/day: 0.25     Years: 3.00     Pack years: 0.75     Types: Cigarettes     Quit date: 9/10/1998     Years since quittin.9    Smokeless tobacco: Never    Tobacco comments:     quit smoking approx 20 yrs ago   Vaping Use    Vaping Use: Never used   Substance and Sexual Activity    Alcohol use:  Yes     Alcohol/week: 1.0 standard drink     Types: 1 Glasses of wine per week     Comment: occasionally    Drug use: No    Sexual activity: Not Currently Other Topics Concern    Not on file   Social History Narrative    Not on file     Social Determinants of Health     Financial Resource Strain: Low Risk     Difficulty of Paying Living Expenses: Not hard at all   Food Insecurity: No Food Insecurity    Worried About [address] in the Last Year: Never true    [facility] in the Last Year: Never true   Transportation Needs: No Transportation Needs    Lack of Transportation (Medical): No    Lack of Transportation (Non-Medical): No   Physical Activity: Inactive    Days of Exercise per Week: 0 days    Minutes of Exercise per Session: 0 min   Stress: No Stress Concern Present    Feeling of Stress :  Only a little   Social Connections: Not on file   Intimate Partner Violence: Not on file   Housing Stability: Not on file           Review of Systems  Review of Systems -  General ROS: negative for - chills, fatigue or malaise  ENT ROS: negative for - hearing change, nasal congestion or nasal discharge  Allergy and Immunology ROS: negative for - hives, itchy/watery eyes or nasal congestion  Hematological and Lymphatic ROS: negative for - blood clots, blood transfusions, bruising or fatigue  Endocrine ROS: negative for - malaise/lethargy, mood swings, palpitations or polydipsia/polyuria  Respiratory ROS: negative for - sputum changes, stridor, tachypnea or wheezing  Cardiovascular ROS: negative for - irregular heartbeat, loss of consciousness, murmur or orthopnea  Gastrointestinal ROS: negative for - constipation, diarrhea, gas/bloating, or hematemesis, positive for heartburn and other epigastric pain  Genito-Urinary ROS: negative for -  genital discharge, genital ulcers or hematuria  Musculoskeletal ROS: negative for - gait disturbance, muscle pain or muscular weakness    Physical exam:   /75   Pulse 98   Temp 97.3 °F (36.3 °C) (Temporal)   Ht 5' 8\" (1.727 m)   Wt 154 lb (69.9 kg)   BMI 23.42 kg/m²   General appearance:  NAD  Pyscho/social: negative for tremors and hallucinations  Head: NCAT, PERRLA, EOMI, red conjunctiva  Neck: supple, no masses  Lungs: CTAB, equal chest rise bilateral  Heart: Reg rate  Abdomen: soft, nontender, nondistended  Skin; no lesions  Gu: no cva tenderness  Extremities: extremities normal, atraumatic, no cyanosis or edema      Assessment:  79 y.o. female with  symptomatic hiatal hernia    Plan:   Cbc, cmp, coags and medical clearance  TO OR   Discussed the risk, benefits and alternatives of surgery including wound infections, bleeding, scar and hernia formation and the risks of general anesthetic including MI, CVA, sudden death or reactions to anesthetic medications. The patient understands the risks and alternatives and the possibility of converting to an open procedure. All questions were answered to the patient's satisfaction and they freely signed the consent.       Keshia Zepeda MD  10:08 AM  9/1/2022

## 2022-09-01 NOTE — TELEPHONE ENCOUNTER
Per the order of Dr. Marquetta Burkitt, patient has been scheduled for Lap poss open HHR w mesh on 22. Patient instructed to please contact our office with any questions. Medical clearance pending. Appt with Dr Choco Ware 22. Surgery form faxed to [facility] and fax confirmation received. Dr. Alyson Mendez to enter orders. Prior Authorization Form:      DEMOGRAPHICS:                     Patient Name:  Js Briggs  Patient :  1940            Insurance:  Payor: [address] [place] [address] / Plan: [facility] HMO / Product Type: *No Product type* /   Insurance ID Number:    Payer/Plan Subscr  Sex Relation Sub. Ins. ID Effective Group Num   1.  [address] 1940 Female Self [id number]                                    [address] [id number]         DIAGNOSIS & PROCEDURE:                       Procedure/Operation: Laparoscopic possible open hiatal hernia repair           CPT Code: 59897    Diagnosis:  Hiatal hernia    ICD10 Code: K44.9    Location:  [facility]    Surgeon:  Isacc Sheridan INFORMATION:                          Date: 22    Time: TBD              Anesthesia:  General                                                       Status:  Outpatient        Special Comments:  N/A       Electronically signed by Donavon Escalante LPN on 1/3/2724 at 05:80 AM

## 2022-09-07 ENCOUNTER — HOSPITAL ENCOUNTER (OUTPATIENT)
Age: 82
Discharge: HOME | End: 2022-09-07
Payer: MEDICARE

## 2022-09-07 DIAGNOSIS — Z01.818 PREOPERATIVE TESTING: ICD-10-CM

## 2022-09-07 PROCEDURE — 93005 ELECTROCARDIOGRAM TRACING: CPT | Performed by: ANESTHESIOLOGY

## 2022-09-08 ENCOUNTER — HOSPITAL ENCOUNTER (OUTPATIENT)
Dept: PREADMISSION TESTING | Age: 82
Discharge: HOME | End: 2022-09-08
Payer: MEDICARE

## 2022-09-08 VITALS
WEIGHT: 155 LBS | SYSTOLIC BLOOD PRESSURE: 120 MMHG | TEMPERATURE: 97.2 F | RESPIRATION RATE: 18 BRPM | HEIGHT: 68 IN | BODY MASS INDEX: 23.49 KG/M2 | HEART RATE: 92 BPM | DIASTOLIC BLOOD PRESSURE: 72 MMHG | OXYGEN SATURATION: 94 %

## 2022-09-08 DIAGNOSIS — Z01.818 PREOPERATIVE TESTING: Primary | ICD-10-CM

## 2022-09-08 LAB
ANION GAP SERPL CALCULATED.3IONS-SCNC: 8 MMOL/L (ref 7–16)
BUN BLDV-MCNC: 21 MG/DL (ref 6–23)
CALCIUM SERPL-MCNC: 9.7 MG/DL (ref 8.6–10.2)
CHLORIDE BLD-SCNC: 103 MMOL/L (ref 98–107)
CO2: 28 MMOL/L (ref 22–29)
CREAT SERPL-MCNC: 0.8 MG/DL (ref 0.5–1)
GFR AFRICAN AMERICAN: >60
GFR NON-AFRICAN AMERICAN: >60 ML/MIN/1.73
GLUCOSE BLD-MCNC: 101 MG/DL (ref 74–99)
HCT VFR BLD CALC: 40.6 % (ref 34–48)
HEMOGLOBIN: 12.5 G/DL (ref 11.5–15.5)
MCH RBC QN AUTO: 30.5 PG (ref 26–35)
MCHC RBC AUTO-ENTMCNC: 30.8 % (ref 32–34.5)
MCV RBC AUTO: 99 FL (ref 80–99.9)
PDW BLD-RTO: 14.3 FL (ref 11.5–15)
PLATELET # BLD: 230 E9/L (ref 130–450)
PMV BLD AUTO: 10.6 FL (ref 7–12)
POTASSIUM REFLEX MAGNESIUM: 4.7 MMOL/L (ref 3.5–5)
RBC # BLD: 4.1 E12/L (ref 3.5–5.5)
SODIUM BLD-SCNC: 139 MMOL/L (ref 132–146)
WBC # BLD: 7.1 E9/L (ref 4.5–11.5)

## 2022-09-08 PROCEDURE — 36415 COLL VENOUS BLD VENIPUNCTURE: CPT

## 2022-09-08 PROCEDURE — 85027 COMPLETE CBC AUTOMATED: CPT

## 2022-09-08 PROCEDURE — 80048 BASIC METABOLIC PNL TOTAL CA: CPT

## 2022-09-08 NOTE — PROGRESS NOTES
[facility]                                                                                                                    PRE OP INSTRUCTIONS FOR  Shaye Larsen        Date: 9/8/2022    Date of surgery: 9/13/22   Arrival Time: Hospital will call you between 5pm and 7pm with your final arrival time for surgery    Do not eat or drink anything after midnight prior to surgery. This includes no water, chewing gum, mints or ice chips. Take the following medications with a small sip of water on the morning of Surgery: pantoprazole , amlodipine/ benazapril      Aspirin, Ibuprofen, Advil, Naproxen, Vitamin E and other Anti-inflammatory products should be stopped  before surgery  as directed by your physician. Take Tylenol only unless instructed otherwise by your surgeon. Do not smoke,use illicit drugs and do not drink any alcoholic beverages 24 hours prior to surgery. You may brush your teeth the morning of surgery. DO NOT SWALLOW WATER    You MUST make arrangements for a responsible adult to take you home after your surgery. You will not be allowed to leave alone or drive yourself home. It is strongly suggested someone stay with you the first 24 hrs. Your surgery will be cancelled if you do not have a ride home. Please wear simple, loose fitting clothing to the hospital.  Emma Dodson not bring valuables (money, credit cards, checkbooks, etc.) Do not wear any makeup (including no eye makeup) or nail polish on your fingers or toes. DO NOT wear any jewelry or piercings on day of surgery. All body piercing jewelry must be removed. Shower the night before surgery with __x_Antibacterial soap /HORTENSIA WIPES________      If appropriate bring crutches, inspirex, WALKER, CANE etc...     Notify your Surgeon if you develop any illness between now and surgery time, cough, cold, fever, sore throat, nausea, vomiting, etc.  Please notify your surgeon if you experience dizziness, shortness of breath or blurred vision between now & the time of your surgery. To provide excellent care visitors will be limited to  1 in the room at any given time. During flu season no children under the age of 15 are permitted in the hospital for the safety of all patients. Other come in main lobby and stop at                  Please call AMBULATORY CARE if you have any further questions.    [address]     [address]

## 2022-09-10 LAB
EKG ATRIAL RATE: 92 BPM
EKG P AXIS: 63 DEGREES
EKG P-R INTERVAL: 148 MS
EKG Q-T INTERVAL: 354 MS
EKG QRS DURATION: 82 MS
EKG QTC CALCULATION (BAZETT): 437 MS
EKG R AXIS: 16 DEGREES
EKG T AXIS: 40 DEGREES
EKG VENTRICULAR RATE: 92 BPM

## 2022-09-13 ENCOUNTER — ANESTHESIA (OUTPATIENT)
Dept: OPERATING ROOM | Age: 82
End: 2022-09-13
Payer: MEDICARE

## 2022-09-13 ENCOUNTER — ANESTHESIA EVENT (OUTPATIENT)
Dept: OPERATING ROOM | Age: 82
End: 2022-09-13
Payer: MEDICARE

## 2022-09-13 ENCOUNTER — HOSPITAL ENCOUNTER (OUTPATIENT)
Age: 82
Setting detail: OUTPATIENT SURGERY
Discharge: HOME | End: 2022-09-13
Attending: SURGERY | Admitting: SURGERY
Payer: MEDICARE

## 2022-09-13 VITALS
SYSTOLIC BLOOD PRESSURE: 152 MMHG | TEMPERATURE: 95.6 F | BODY MASS INDEX: 23.49 KG/M2 | OXYGEN SATURATION: 91 % | DIASTOLIC BLOOD PRESSURE: 70 MMHG | HEART RATE: 84 BPM | HEIGHT: 68 IN | WEIGHT: 155 LBS | RESPIRATION RATE: 20 BRPM

## 2022-09-13 PROBLEM — K44.9 HIATAL HERNIA: Status: ACTIVE | Noted: 2022-09-13

## 2022-09-13 PROCEDURE — 6360000002 HC RX W HCPCS: Performed by: ANESTHESIOLOGY

## 2022-09-13 PROCEDURE — 6360000002 HC RX W HCPCS

## 2022-09-13 PROCEDURE — 3600000004 HC SURGERY LEVEL 4 BASE: Performed by: SURGERY

## 2022-09-13 PROCEDURE — 2500000003 HC RX 250 WO HCPCS

## 2022-09-13 PROCEDURE — 2580000003 HC RX 258: Performed by: SURGERY

## 2022-09-13 PROCEDURE — 99024 POSTOP FOLLOW-UP VISIT: CPT | Performed by: SURGERY

## 2022-09-13 PROCEDURE — 2709999900 HC NON-CHARGEABLE SUPPLY: Performed by: SURGERY

## 2022-09-13 PROCEDURE — 43282 LAP PARAESOPH HER RPR W/MESH: CPT | Performed by: SURGERY

## 2022-09-13 PROCEDURE — 2580000003 HC RX 258

## 2022-09-13 PROCEDURE — 7100000011 HC PHASE II RECOVERY - ADDTL 15 MIN: Performed by: SURGERY

## 2022-09-13 PROCEDURE — 7100000001 HC PACU RECOVERY - ADDTL 15 MIN: Performed by: SURGERY

## 2022-09-13 PROCEDURE — 2500000003 HC RX 250 WO HCPCS: Performed by: SURGERY

## 2022-09-13 PROCEDURE — C1781 MESH (IMPLANTABLE): HCPCS | Performed by: SURGERY

## 2022-09-13 PROCEDURE — 6360000002 HC RX W HCPCS: Performed by: SURGERY

## 2022-09-13 PROCEDURE — 15734 MUSCLE-SKIN GRAFT TRUNK: CPT | Performed by: SURGERY

## 2022-09-13 PROCEDURE — 7100000010 HC PHASE II RECOVERY - FIRST 15 MIN: Performed by: SURGERY

## 2022-09-13 PROCEDURE — 7100000000 HC PACU RECOVERY - FIRST 15 MIN: Performed by: SURGERY

## 2022-09-13 PROCEDURE — 3700000001 HC ADD 15 MINUTES (ANESTHESIA): Performed by: SURGERY

## 2022-09-13 PROCEDURE — 3600000014 HC SURGERY LEVEL 4 ADDTL 15MIN: Performed by: SURGERY

## 2022-09-13 PROCEDURE — 2720000010 HC SURG SUPPLY STERILE: Performed by: SURGERY

## 2022-09-13 PROCEDURE — 3700000000 HC ANESTHESIA ATTENDED CARE: Performed by: SURGERY

## 2022-09-13 DEVICE — PHASIX ST MESH, 7 CM X 10 CM (3" X 4"), RECTANGLE
Type: IMPLANTABLE DEVICE | Site: ABDOMEN | Status: FUNCTIONAL
Brand: PHASIX

## 2022-09-13 RX ORDER — DEXAMETHASONE SODIUM PHOSPHATE 10 MG/ML
INJECTION, SOLUTION INTRAMUSCULAR; INTRAVENOUS PRN
Status: DISCONTINUED | OUTPATIENT
Start: 2022-09-13 | End: 2022-09-13 | Stop reason: SDUPTHER

## 2022-09-13 RX ORDER — PROPOFOL 10 MG/ML
INJECTION, EMULSION INTRAVENOUS PRN
Status: DISCONTINUED | OUTPATIENT
Start: 2022-09-13 | End: 2022-09-13 | Stop reason: SDUPTHER

## 2022-09-13 RX ORDER — FENTANYL CITRATE 50 UG/ML
INJECTION, SOLUTION INTRAMUSCULAR; INTRAVENOUS PRN
Status: DISCONTINUED | OUTPATIENT
Start: 2022-09-13 | End: 2022-09-13 | Stop reason: SDUPTHER

## 2022-09-13 RX ORDER — SODIUM CHLORIDE, SODIUM LACTATE, POTASSIUM CHLORIDE, CALCIUM CHLORIDE 600; 310; 30; 20 MG/100ML; MG/100ML; MG/100ML; MG/100ML
INJECTION, SOLUTION INTRAVENOUS CONTINUOUS PRN
Status: DISCONTINUED | OUTPATIENT
Start: 2022-09-13 | End: 2022-09-13 | Stop reason: SDUPTHER

## 2022-09-13 RX ORDER — SODIUM CHLORIDE 9 MG/ML
INJECTION, SOLUTION INTRAVENOUS PRN
Status: DISCONTINUED | OUTPATIENT
Start: 2022-09-13 | End: 2022-09-13 | Stop reason: HOSPADM

## 2022-09-13 RX ORDER — IBUPROFEN 800 MG/1
800 TABLET ORAL EVERY 6 HOURS PRN
Qty: 20 TABLET | Refills: 0 | Status: SHIPPED | OUTPATIENT
Start: 2022-09-13

## 2022-09-13 RX ORDER — ONDANSETRON 2 MG/ML
INJECTION INTRAMUSCULAR; INTRAVENOUS PRN
Status: DISCONTINUED | OUTPATIENT
Start: 2022-09-13 | End: 2022-09-13 | Stop reason: SDUPTHER

## 2022-09-13 RX ORDER — SODIUM CHLORIDE 0.9 % (FLUSH) 0.9 %
5-40 SYRINGE (ML) INJECTION EVERY 12 HOURS SCHEDULED
Status: DISCONTINUED | OUTPATIENT
Start: 2022-09-13 | End: 2022-09-13 | Stop reason: HOSPADM

## 2022-09-13 RX ORDER — ROCURONIUM BROMIDE 10 MG/ML
INJECTION, SOLUTION INTRAVENOUS PRN
Status: DISCONTINUED | OUTPATIENT
Start: 2022-09-13 | End: 2022-09-13 | Stop reason: SDUPTHER

## 2022-09-13 RX ORDER — BUPIVACAINE HYDROCHLORIDE 2.5 MG/ML
INJECTION, SOLUTION EPIDURAL; INFILTRATION; INTRACAUDAL PRN
Status: DISCONTINUED | OUTPATIENT
Start: 2022-09-13 | End: 2022-09-13 | Stop reason: ALTCHOICE

## 2022-09-13 RX ORDER — FENTANYL CITRATE 50 UG/ML
25 INJECTION, SOLUTION INTRAMUSCULAR; INTRAVENOUS EVERY 5 MIN PRN
Status: DISCONTINUED | OUTPATIENT
Start: 2022-09-13 | End: 2022-09-13 | Stop reason: HOSPADM

## 2022-09-13 RX ORDER — SODIUM CHLORIDE 0.9 % (FLUSH) 0.9 %
5-40 SYRINGE (ML) INJECTION PRN
Status: DISCONTINUED | OUTPATIENT
Start: 2022-09-13 | End: 2022-09-13 | Stop reason: HOSPADM

## 2022-09-13 RX ORDER — METHOCARBAMOL 500 MG/1
500 TABLET, FILM COATED ORAL 4 TIMES DAILY
Qty: 40 TABLET | Refills: 0 | Status: SHIPPED | OUTPATIENT
Start: 2022-09-13 | End: 2022-09-23

## 2022-09-13 RX ORDER — GLYCOPYRROLATE 0.2 MG/ML
INJECTION INTRAMUSCULAR; INTRAVENOUS PRN
Status: DISCONTINUED | OUTPATIENT
Start: 2022-09-13 | End: 2022-09-13 | Stop reason: SDUPTHER

## 2022-09-13 RX ORDER — LIDOCAINE HYDROCHLORIDE 20 MG/ML
INJECTION, SOLUTION EPIDURAL; INFILTRATION; INTRACAUDAL; PERINEURAL PRN
Status: DISCONTINUED | OUTPATIENT
Start: 2022-09-13 | End: 2022-09-13 | Stop reason: SDUPTHER

## 2022-09-13 RX ORDER — NEOSTIGMINE METHYLSULFATE 1 MG/ML
INJECTION, SOLUTION INTRAVENOUS PRN
Status: DISCONTINUED | OUTPATIENT
Start: 2022-09-13 | End: 2022-09-13 | Stop reason: SDUPTHER

## 2022-09-13 RX ORDER — FENTANYL CITRATE 50 UG/ML
INJECTION, SOLUTION INTRAMUSCULAR; INTRAVENOUS
Status: COMPLETED
Start: 2022-09-13 | End: 2022-09-13

## 2022-09-13 RX ORDER — SODIUM CHLORIDE, SODIUM LACTATE, POTASSIUM CHLORIDE, CALCIUM CHLORIDE 600; 310; 30; 20 MG/100ML; MG/100ML; MG/100ML; MG/100ML
INJECTION, SOLUTION INTRAVENOUS CONTINUOUS
Status: DISCONTINUED | OUTPATIENT
Start: 2022-09-13 | End: 2022-09-13 | Stop reason: HOSPADM

## 2022-09-13 RX ADMIN — CEFAZOLIN 2000 MG: 2 INJECTION, POWDER, FOR SOLUTION INTRAMUSCULAR; INTRAVENOUS at 10:28

## 2022-09-13 RX ADMIN — HYDROMORPHONE HYDROCHLORIDE 0.25 MG: 1 INJECTION, SOLUTION INTRAMUSCULAR; INTRAVENOUS; SUBCUTANEOUS at 11:51

## 2022-09-13 RX ADMIN — LIDOCAINE HYDROCHLORIDE 60 MG: 20 INJECTION, SOLUTION EPIDURAL; INFILTRATION; INTRACAUDAL; PERINEURAL at 10:34

## 2022-09-13 RX ADMIN — HYDROMORPHONE HYDROCHLORIDE 0.25 MG: 1 INJECTION, SOLUTION INTRAMUSCULAR; INTRAVENOUS; SUBCUTANEOUS at 12:05

## 2022-09-13 RX ADMIN — GLYCOPYRROLATE 0.6 MG: 0.2 INJECTION INTRAMUSCULAR; INTRAVENOUS at 11:12

## 2022-09-13 RX ADMIN — PHENYLEPHRINE HYDROCHLORIDE 200 MCG: 10 INJECTION INTRAVENOUS at 10:40

## 2022-09-13 RX ADMIN — PROPOFOL 100 MG: 10 INJECTION, EMULSION INTRAVENOUS at 10:34

## 2022-09-13 RX ADMIN — DEXAMETHASONE SODIUM PHOSPHATE 10 MG: 10 INJECTION, SOLUTION INTRAMUSCULAR; INTRAVENOUS at 10:40

## 2022-09-13 RX ADMIN — FENTANYL CITRATE 50 MCG: 50 INJECTION, SOLUTION INTRAMUSCULAR; INTRAVENOUS at 10:44

## 2022-09-13 RX ADMIN — FENTANYL CITRATE 25 MCG: 50 INJECTION, SOLUTION INTRAMUSCULAR; INTRAVENOUS at 12:20

## 2022-09-13 RX ADMIN — ONDANSETRON 4 MG: 2 INJECTION INTRAMUSCULAR; INTRAVENOUS at 10:40

## 2022-09-13 RX ADMIN — FENTANYL CITRATE 50 MCG: 50 INJECTION, SOLUTION INTRAMUSCULAR; INTRAVENOUS at 10:34

## 2022-09-13 RX ADMIN — SODIUM CHLORIDE, POTASSIUM CHLORIDE, SODIUM LACTATE AND CALCIUM CHLORIDE: 600; 310; 30; 20 INJECTION, SOLUTION INTRAVENOUS at 10:25

## 2022-09-13 RX ADMIN — ROCURONIUM BROMIDE 30 MG: 10 INJECTION, SOLUTION INTRAVENOUS at 10:34

## 2022-09-13 RX ADMIN — SODIUM CHLORIDE, POTASSIUM CHLORIDE, SODIUM LACTATE AND CALCIUM CHLORIDE: 600; 310; 30; 20 INJECTION, SOLUTION INTRAVENOUS at 08:39

## 2022-09-13 RX ADMIN — Medication 3 MG: at 11:12

## 2022-09-13 ASSESSMENT — PAIN DESCRIPTION - PAIN TYPE
TYPE: SURGICAL PAIN

## 2022-09-13 ASSESSMENT — PAIN DESCRIPTION - ONSET
ONSET: ON-GOING

## 2022-09-13 ASSESSMENT — PAIN DESCRIPTION - LOCATION
LOCATION: ABDOMEN

## 2022-09-13 ASSESSMENT — PAIN DESCRIPTION - ORIENTATION
ORIENTATION: MID

## 2022-09-13 ASSESSMENT — PAIN SCALES - GENERAL
PAINLEVEL_OUTOF10: 6
PAINLEVEL_OUTOF10: 10
PAINLEVEL_OUTOF10: 2
PAINLEVEL_OUTOF10: 5
PAINLEVEL_OUTOF10: 4
PAINLEVEL_OUTOF10: 4
PAINLEVEL_OUTOF10: 5
PAINLEVEL_OUTOF10: 7

## 2022-09-13 ASSESSMENT — ENCOUNTER SYMPTOMS: SHORTNESS OF BREATH: 0

## 2022-09-13 ASSESSMENT — PAIN DESCRIPTION - FREQUENCY
FREQUENCY: CONTINUOUS

## 2022-09-13 ASSESSMENT — PAIN DESCRIPTION - DESCRIPTORS
DESCRIPTORS: ACHING
DESCRIPTORS: ACHING
DESCRIPTORS: SORE
DESCRIPTORS: ACHING
DESCRIPTORS: SORE

## 2022-09-13 ASSESSMENT — PAIN - FUNCTIONAL ASSESSMENT: PAIN_FUNCTIONAL_ASSESSMENT: 0-10

## 2022-09-13 NOTE — H&P
General Surgery History and Physical     Patient's Name/Date of Birth: Josh Kim / 1940     Date: September 13, 2022      Surgeon: Genesis Sparrow M.D.     PCP: Dee Taylor DO     Chief Complaint: hiatal hernia     HPI:   Josh Kim is a 79 y.o. female who presents for evaluation of symptomatic hiatal hernia that was responsive to medical treatment but has become recalcitrant. Has severe GERD, EGD showed HH, Has not had manometry. Has had CT imaging. Timing is constant, radiation to epigastrium, alleviated by nothing and started years ago and severity is 2-7/10         Past Medical History        Past Medical History:   Diagnosis Date    Arthritis       right hip    Cancer ([address]       breast 1997 approx. left    Hyperlipidemia      Hypertension      Osteoarthrosis      Osteoporosis      Other chronic postoperative pain      Undiagnosed cardiac murmurs              Past Surgical History         Past Surgical History:   Procedure Laterality Date    ANESTHESIA NERVE BLOCK Left 4/30/2019     LUMBAR EPIDURAL STERIOD INJECTION L4-5  (LEFT PARAMEDIAN APPROACH) performed by Everett Oliveira MD at [address]         biopsied on left breast    COLONOSCOPY        EPIDURAL STEROID INJECTION Left 3/19/2019     LEFT LUMBAR TRANSFORAMINAL EPIDURAL STEROID INJECTION: L4 / L5 performed by Everett Oliveira MD at [address]         right ankle    HYSTERECTOMY (CERVIX STATUS UNKNOWN)   1979    JOINT REPLACEMENT         L hip     JOINT REPLACEMENT   10/2013     R hip    JOINT REPLACEMENT   03/08/2016     L knee     JOINT REPLACEMENT   11/03/2020     R knee     NERVE BLOCK Left 03/19/2019     left lumbar transforaminal epidural    NERVE BLOCK Left 04/30/2019     lumbar    REVISION TOTAL HIP ARTHROPLASTY Left 3/31/2022     REVISION LEFT TOTAL HIP ([address]) performed by Baljit Shoemaker DO at [address] TONSILLECTOMY        TOTAL HIP ARTHROPLASTY Right Oct 2013     with cell saver    TOTAL KNEE ARTHROPLASTY Left 3/8/16    TOTAL KNEE ARTHROPLASTY Right 11/3/2020     TOTAL RIGHT KNEE REPLACEMENT/ ARTHROPLASTY (BERNABE) performed by Kojo Vasquez DO at [address]            Current Facility-Administered Medications          Current Outpatient Medications   Medication Sig Dispense Refill    pantoprazole (PROTONIX) 40 MG tablet TAKE 1 TABLET BY MOUTH EVERY MORNING        amLODIPine-benazepril (LOTREL) 2.5-10 MG per capsule Take 1 capsule by mouth daily 30 capsule 2    aspirin 325 MG EC tablet Take 1 tablet by mouth 2 times daily 56 tablet 1    vitamin D (CHOLECALCIFEROL) 1000 UNITS TABS tablet Take 2,000 Units by mouth daily        Red Yeast Rice 600 MG CAPS Take 2 capsules by mouth three times a week        Multiple Vitamins-Minerals (CENTRUM SILVER ADULT 50+ PO) Take  by mouth daily. calcium-vitamin D (OSCAL-500) 500-200 MG-UNIT per tablet Take 1 tablet by mouth 2 times daily. NONFORMULARY daily. Tunde Red        atorvastatin (LIPITOR) 40 MG tablet Take 40 mg by mouth at bedtime (Patient not taking: Reported on 2022)          No current facility-administered medications for this visit. No Known Allergies     The patient has a family history that is negative for severe cardiovascular or respiratory issues, negative for reaction to anesthesia. Social History               Socioeconomic History    Marital status:         Spouse name: Not on file    Number of children: Not on file    Years of education: Not on file    Highest education level: Not on file   Occupational History    Not on file   Tobacco Use    Smoking status: Former       Packs/day: 0.25       Years: 3.00       Pack years: 0.75       Types: Cigarettes       Quit date: 9/10/1998       Years since quittin.9    Smokeless tobacco: Never    Tobacco comments:       quit smoking approx 20 yrs ago   Vaping Use    Vaping Use: Never used   Substance and Sexual Activity    Alcohol use: Yes       Alcohol/week: 1.0 standard drink       Types: 1 Glasses of wine per week       Comment: occasionally    Drug use: No    Sexual activity: Not Currently   Other Topics Concern    Not on file   Social History Narrative    Not on file      Social Determinants of Health          Financial Resource Strain: Low Risk     Difficulty of Paying Living Expenses: Not hard at all   Food Insecurity: No Food Insecurity    Worried About Running Out of Food in the Last Year: Never true    [address] in the Last Year: Never true   Transportation Needs: No Transportation Needs    Lack of Transportation (Medical): No    Lack of Transportation (Non-Medical): No   Physical Activity: Inactive    Days of Exercise per Week: 0 days    Minutes of Exercise per Session: 0 min   Stress: No Stress Concern Present    Feeling of Stress :  Only a little   Social Connections: Not on file   Intimate Partner Violence: Not on file   Housing Stability: Not on file                  Review of Systems  Review of Systems -  General ROS: negative for - chills, fatigue or malaise  ENT ROS: negative for - hearing change, nasal congestion or nasal discharge  Allergy and Immunology ROS: negative for - hives, itchy/watery eyes or nasal congestion  Hematological and Lymphatic ROS: negative for - blood clots, blood transfusions, bruising or fatigue  Endocrine ROS: negative for - malaise/lethargy, mood swings, palpitations or polydipsia/polyuria  Respiratory ROS: negative for - sputum changes, stridor, tachypnea or wheezing  Cardiovascular ROS: negative for - irregular heartbeat, loss of consciousness, murmur or orthopnea  Gastrointestinal ROS: negative for - constipation, diarrhea, gas/bloating, or hematemesis, positive for heartburn and other epigastric pain  Genito-Urinary ROS: negative for -  genital discharge, genital ulcers or hematuria  Musculoskeletal ROS: negative for - gait disturbance, muscle pain or muscular weakness     Physical exam:   /75   Pulse 98   Temp 97.3 °F (36.3 °C) (Temporal)   Ht 5' 8\" (1.727 m)   Wt 154 lb (69.9 kg)   BMI 23.42 kg/m²   General appearance:  NAD  Pyscho/social: negative for tremors and hallucinations  Head: NCAT, PERRLA, EOMI, red conjunctiva  Neck: supple, no masses  Lungs: CTAB, equal chest rise bilateral  Heart: Reg rate  Abdomen: soft, nontender, nondistended  Skin; no lesions  Gu: no cva tenderness  Extremities: extremities normal, atraumatic, no cyanosis or edema        Assessment:  79 y.o. female with  symptomatic hiatal hernia     Plan:   Cbc, cmp, coags and medical clearance  TO OR   Discussed the risk, benefits and alternatives of surgery including wound infections, bleeding, scar and hernia formation and the risks of general anesthetic including MI, CVA, sudden death or reactions to anesthetic medications. The patient understands the risks and alternatives and the possibility of converting to an open procedure. All questions were answered to the patient's satisfaction and they freely signed the consent.         Jaylyn Kimble MD

## 2022-09-13 NOTE — PROGRESS NOTES
CLINICAL PHARMACY NOTE: MEDS TO BEDS    Total # of Prescriptions Filled: 2   The following medications were delivered to the patient:  Methocarbamol 500mg  Ibuprofen 800 mg    Additional Documentation:

## 2022-09-13 NOTE — DISCHARGE INSTRUCTIONS
Patient Discharge Instructions for Hiatal hernia repair  Discharge Date:  9/13/2022    Discharged To: Home    RESUME ACTIVITY:      BATHING:  May shower 24hrs after surgery, may bathe 3 days after surgery    DRIVING: No driving while on pain medications    RETURN TO WORK: after follow up appointment    WALKING:  Yes    SEXUAL ACTIVITY: Yes    STAIRS:  Yes    LIFTING: Less than 25 pounds for 2 weeks then less than 50 pounds for 2 additional weeks then no limitations    DIET: per handout already given    BOWELS: constipation is a side effect of your pain meds, take a daily laxative (miralax, dulcolax, etc.) as needed to keep your bowels moving as they normally do, do not go 2-3 days without having a bowel movement. SPECIAL INSTRUCTIONS:     Call the office at [phone] if you have a fever > 100 F, or if your incision becomes red, tender, or drains more than a small amount of clear fluid.     Call  for follow up appointment with Dr. Miguel Nuno in:  as scheduled

## 2022-09-13 NOTE — ANESTHESIA POSTPROCEDURE EVALUATION
Department of Anesthesiology  Postprocedure Note    Patient: Shaye Larsen  MRN: [id number]  YOB: 1940  Date of evaluation: 9/13/2022      Procedure Summary     Date: 09/13/22 Room / Location: [address] [place] [address] 03 / [facility]    Anesthesia Start: 5528 Anesthesia Stop: 4312    Procedure: [address]  (Abdomen) Diagnosis:       Hiatal hernia      (Hiatal hernia [K44.9])    Surgeons: Nikunj Hancock MD Responsible Provider: Maria De Jesus Dunbar DO    Anesthesia Type: general ASA Status: 3          Anesthesia Type: No value filed.     Alpa Phase I: Alpa Score: 10    Alpa Phase II: Alpa Score: 10      Anesthesia Post Evaluation    Patient location during evaluation: PACU  Patient participation: complete - patient participated  Level of consciousness: awake and alert  Airway patency: patent  Nausea & Vomiting: no nausea and no vomiting  Complications: no  Cardiovascular status: hemodynamically stable  Respiratory status: acceptable  Hydration status: euvolemic

## 2022-09-29 ENCOUNTER — OFFICE VISIT (OUTPATIENT)
Dept: SURGERY | Age: 82
End: 2022-09-29

## 2022-09-29 VITALS
HEIGHT: 68 IN | WEIGHT: 155 LBS | BODY MASS INDEX: 23.49 KG/M2 | HEART RATE: 98 BPM | DIASTOLIC BLOOD PRESSURE: 82 MMHG | RESPIRATION RATE: 18 BRPM | TEMPERATURE: 97.2 F | SYSTOLIC BLOOD PRESSURE: 133 MMHG

## 2022-09-29 DIAGNOSIS — Z09 POSTOPERATIVE EXAMINATION: Primary | ICD-10-CM

## 2022-09-29 PROCEDURE — 99024 POSTOP FOLLOW-UP VISIT: CPT | Performed by: SURGERY

## 2022-09-29 NOTE — PROGRESS NOTES
Surgery Progress Note            Chief complaint:   Patient Active Problem List   Diagnosis    Degenerative arthritis of hip R    Primary osteoarthritis of left knee    Essential hypertension    Familial hypercholesterolemia    Osteoporosis    Spinal stenosis of lumbar region    Lumbar radiculopathy    DDD (degenerative disc disease), lumbar    Synovial cyst of lumbar facet joint    Arthritis of right knee    DJD (degenerative joint disease)    Hiatal hernia       S: doing well    O:   Vitals:    09/29/22 0949   BP: 133/82   Pulse: 98   Resp: 18   Temp: 97.2 °F (36.2 °C)     No intake or output data in the 24 hours ending 09/29/22 1006        Labs:  No results for input(s): WBC, HGB, HCT in the last 72 hours. Invalid input(s): PLR  Lab Results   Component Value Date    CREATININE 0.8 09/08/2022    BUN 21 09/08/2022     09/08/2022    K 4.7 09/08/2022     09/08/2022    CO2 28 09/08/2022     No results for input(s): LIPASE, AMYLASE in the last 72 hours. Physical exam:   /82   Pulse 98   Temp 97.2 °F (36.2 °C) (Temporal)   Resp 18   Ht 5' 8\" (1.727 m)   Wt 155 lb (70.3 kg)   BMI 23.57 kg/m²   General appearance: NAD  Head: NCAT  Neck: supple, no masses  Lungs: equal chest rise bilateral  Heart: S1S2 present  Abdomen: soft, nontender, nondistended  Skin; no new lesions, incisions clean and intact  Gu: no cva tenderness  Extremities: extremities normal, atraumatic, no cyanosis or edema    A:  Post op lap HHR with mesh    P: follow up as needed.      Siobhan Jacob MD, MD  9/29/2022

## 2022-10-06 DIAGNOSIS — M25.552 LEFT HIP PAIN: Primary | ICD-10-CM

## 2022-10-07 ENCOUNTER — OFFICE VISIT (OUTPATIENT)
Dept: ORTHOPEDIC SURGERY | Age: 82
End: 2022-10-07
Payer: MEDICARE

## 2022-10-07 VITALS — TEMPERATURE: 98 F | WEIGHT: 155 LBS | HEIGHT: 68 IN | BODY MASS INDEX: 23.49 KG/M2

## 2022-10-07 DIAGNOSIS — Z96.649 STATUS POST REVISION OF TOTAL HIP REPLACEMENT: Primary | ICD-10-CM

## 2022-10-07 PROCEDURE — 99213 OFFICE O/P EST LOW 20 MIN: CPT | Performed by: ORTHOPAEDIC SURGERY

## 2022-10-07 PROCEDURE — 1123F ACP DISCUSS/DSCN MKR DOCD: CPT | Performed by: ORTHOPAEDIC SURGERY

## 2022-10-07 NOTE — PROGRESS NOTES
Chief Complaint:   Chief Complaint   Patient presents with    Hip Pain     Left ROLF revision f/u doing well. Randal Finn follows up 6 months post revision of hip bearings to a constrained conformity left hip. She is doing very well. She is not complaining of any problems whatsoever. Gait is good and activity is good she is comfortable. She has had no more episodes of feelings of instability. Allergies; medications; past medical, surgical, family, and social history; and problem list have been reviewed today and updated as indicated in this encounter seen below. Exam: Range of motion of the left hip is good with no pain and no instability. There is no crepitus. Gait is somewhat slow and stooped over as it normally is with her overall condition. She does very well. Radiographs: Imaging of the left hip shows a stable bio ingrowth hip replacement arthroplasty with evidence of constrained bearings with no malalignment. ASSESSMENT:    Elizabeth Rodriges was seen today for hip pain. Diagnoses and all orders for this visit:    Status post revision of total hip replacement        PLAN: Activity as tolerated and follow-up as needed    Return if symptoms worsen or fail to improve. Current Outpatient Medications   Medication Sig Dispense Refill    predniSONE (DELTASONE) 5 MG tablet 1 tab 3 times a day for 2 days (after meals), then 1 tab 2 times a day.  10 tablet 0    ibuprofen (ADVIL;MOTRIN) 800 MG tablet Take 1 tablet by mouth every 6 hours as needed for Pain 20 tablet 0    amLODIPine-benazepril (LOTREL) 2.5-10 MG per capsule Take 1 capsule by mouth daily 30 capsule 2    pantoprazole (PROTONIX) 40 MG tablet TAKE 1 TABLET BY MOUTH EVERY MORNING      aspirin 325 MG EC tablet Take 1 tablet by mouth 2 times daily 56 tablet 1    atorvastatin (LIPITOR) 40 MG tablet Take 40 mg by mouth at bedtime      vitamin D (CHOLECALCIFEROL) 1000 UNITS TABS tablet Take 2,000 Units by mouth daily      Red Yeast Rice 600 MG CAPS Take 2 capsules by mouth three times a week      Multiple Vitamins-Minerals (CENTRUM SILVER ADULT 50+ PO) Take  by mouth daily. calcium-vitamin D (OSCAL-500) 500-200 MG-UNIT per tablet Take 1 tablet by mouth 2 times daily. NONFORMULARY daily. Tunde Red       No current facility-administered medications for this visit. Patient Active Problem List   Diagnosis    Degenerative arthritis of hip R    Primary osteoarthritis of left knee    Essential hypertension    Familial hypercholesterolemia    Osteoporosis    Spinal stenosis of lumbar region    Lumbar radiculopathy    DDD (degenerative disc disease), lumbar    Synovial cyst of lumbar facet joint    Arthritis of right knee    DJD (degenerative joint disease)    Hiatal hernia       Past Medical History:   Diagnosis Date    Arthritis     right hip    Cancer ([address]     breast 1997 approx. left    Hyperlipidemia     Hypertension     Osteoarthrosis     Osteoporosis     Other chronic postoperative pain     Undiagnosed cardiac murmurs        Past Surgical History:   Procedure Laterality Date    ANESTHESIA NERVE BLOCK Left 4/30/2019    LUMBAR EPIDURAL STERIOD INJECTION L4-5  (LEFT PARAMEDIAN APPROACH) performed by Kaleigh Byrnes MD at [address] [place] [address]      biopsied on left breast    COLONOSCOPY      EPIDURAL STEROID INJECTION Left 3/19/2019    LEFT LUMBAR TRANSFORAMINAL EPIDURAL STEROID INJECTION: L4 / L5 performed by Kaleigh Byrnes MD at . [address]      right ankle    HIATAL HERNIA REPAIR N/A 9/13/2022    LAPAROSCOPIC HERNIA HIATAL REPAIR WITH MESH performed by Guy Kayser, MD at [facility] ([facility])  [address] [facility] hip     JOINT REPLACEMENT  10/2013    R hip    JOINT REPLACEMENT  03/08/2016    L knee     JOINT REPLACEMENT  11/03/2020    R knee     NERVE BLOCK Left 03/19/2019    left lumbar transforaminal epidural    NERVE BLOCK Left 2019    lumbar    REVISION TOTAL HIP ARTHROPLASTY Left 3/31/2022    REVISION LEFT TOTAL HIP (DEPUY) performed by Laura Peralta DO at [address] Oct 2013    with cell saver    TOTAL KNEE ARTHROPLASTY Left 3/8/16    TOTAL KNEE ARTHROPLASTY Right 11/3/2020    TOTAL RIGHT KNEE REPLACEMENT/ ARTHROPLASTY (BERNABE) performed by Laura Peralta DO at [address]       No Known Allergies    Social History     Socioeconomic History    Marital status:      Spouse name: None    Number of children: None    Years of education: None    Highest education level: None   Tobacco Use    Smoking status: Former     Packs/day: 0.25     Years: 3.00     Pack years: 0.75     Types: Cigarettes     Quit date: 9/10/1998     Years since quittin.0    Smokeless tobacco: Never    Tobacco comments:     quit smoking approx 20 yrs ago   Vaping Use    Vaping Use: Never used   Substance and Sexual Activity    Alcohol use: Yes     Alcohol/week: 1.0 standard drink     Types: 1 Glasses of wine per week     Comment: occasionally    Drug use: No    Sexual activity: Not Currently     Social Determinants of Health     Financial Resource Strain: Low Risk     Difficulty of Paying Living Expenses: Not hard at all   Food Insecurity: No Food Insecurity    Worried About Running Out of Food in the Last Year: Never true    Ran Out of Food in the Last Year: Never true   Transportation Needs: No Transportation Needs    Lack of Transportation (Medical): No    Lack of Transportation (Non-Medical): No   Physical Activity: Inactive    Days of Exercise per Week: 0 days    Minutes of Exercise per Session: 0 min   Stress: No Stress Concern Present    Feeling of Stress : Only a little       Review of Systems  As follows except as previously noted in HPI:  Constitutional: Negative for chills, diaphoresis, fatigue, fever and unexpected weight change.    Respiratory: Negative for cough, shortness of breath and wheezing. Cardiovascular: Negative for chest pain and palpitations. Neurological: Negative for dizziness, syncope, cephalgia. GI / : negative  Musculoskeletal: see HPI       Objective:   Physical Exam   Constitutional: Oriented to person, place, and time. and appears well-developed and well-nourished. :   Head: Normocephalic and atraumatic. Eyes: EOM are normal.   Neck: Neck supple. Cardiovascular: Normal rate and regular rhythm. Pulmonary/Chest: Effort normal. No stridor. No respiratory distress, no wheezes. Abdominal:  No abnormal distension. Neurological: Alert and oriented to person, place, and time. Skin: Skin is warm and dry. Psychiatric: Normal mood and affect.  Behavior is normal. Thought content normal.    CHI Menendez DO    10/7/22  8:47 AM

## 2022-11-30 NOTE — OP NOTE
DATE OF PROCEDURE: 9/13/2022    SURGEON: Jeremiah Manning M.D.    ASSISTANT: none    PREOPERATIVE DIAGNOSIS: Large paraesophageal midline hiatal hernia with severe reflux and muscle disruption. POSTOPERATIVE DIAGNOSIS: same    OPERATION:   1.)Laparoscopic midline paraesophageal hiatal hernia repair  with mesh  2.) isma cutaneous flap and      ANESTHESIA: General.    ESTIMATED BLOOD LOSS: 40 mL. COMPLICATIONS: None. FLUIDS: Crystalloid. DISPOSITION: home    SPECIMEN: None. INDICATION: This is a patient who came in with the aforementioned  diagnosis. The patient had severe reflux. I explained the risks, benefits,  potential outcomes, alternative treatment to aforementioned procedure, and  she agreed to proceed, understanding those risks and potential outcomes. DESCRIPTION OF PROCEDURE: The patient was brought to the operative suite,  was placed under general anesthesia, was given preoperative antibiotics  within 30 minutes of incision, then had bilateral PCDs placed. Once this was done a 5 mm incision was made in the supraumbilical area. After local anesthetic was infiltrated into the abdominal wall, a Veress  needle was used to pass into the peritoneum. CO2 was then used to insufflate  the abdomen to a pressure of 15 mmHg. At this time, the Veress needle was  removed, and an 5 mm trocar was placed through this incision. Four  additional trocars were placed, 2 in the right lateral abdomen, one a 5 mm  trocar that was scythed through the rectus sheath at 2 different levels. An  additional 5 mm trocar was placed in the right lateral abdomen. An 5 mm  trocar was also placed in the left upper quadrant of the abdomen. At this time,  a Constanza retractor was put into this to retract the liver,  exposing the hiatal hernia. Hiatal hernia was identified. The hernia sac  and its crural attachments were taken down using electrocautery and blunt  dissection.  We were able to expose the entire esophagus and stomach that was  into this hiatal hernia and reduce it into the abdomen. We bluntly dissected  around the entire esophagus, mobilizing that down into the abdomen to have  approximately 3 cm of intraabdominal esophagus. At this time, we repaired the crura in a posterior fashion with a running,  nonabsorbable V-Loc suture. An anterior suture was placed as well with absorbable V lock suture. Pledgets were used both anterior x 1 an posterior x 1  Once this was completed, a phasix mesh was cut to keyhole around the esophagus and was placed posteriorly and sewn in place with v lock sutures. A falciform myofascial flap was then placed by taking the falciform down from abdominal wall and was placed posteriorly around to buttress our repair and protect the esophagus from the mesh. This was to support our repair as well as to  anchor the stomach and esophagus intra-abdominally. This was also done with  V abel sutures. Once this was completed, an EGD scope was done to assure that  this was proper. The repair did not appear to be too tight, and the  endoscope passed easily into the stomach. It was retroflexed at that point  and the hiatus was intact without appearing to be too tight. The endoscope  was then removed. . Pneumoperitoneum was  evacuated. All lap and suture counts were correct, and the trocar sites were  closed with 4-0 Monocryl sutures in a subcuticular fashion, and then  Dermabond was placed. The patient tolerated the procedure well and was taken to PACU. [Well Developed] : well developed [Well Nourished] : well nourished [No Acute Distress] : no acute distress [Normal Conjunctiva] : normal conjunctiva [Normal Venous Pressure] : normal venous pressure [No Carotid Bruit] : no carotid bruit [Normal S1, S2] : normal S1, S2 [No Murmur] : no murmur [No Rub] : no rub [No Gallop] : no gallop [Clear Lung Fields] : clear lung fields [Good Air Entry] : good air entry [No Respiratory Distress] : no respiratory distress  [Soft] : abdomen soft [Non Tender] : non-tender [No Masses/organomegaly] : no masses/organomegaly [Normal Bowel Sounds] : normal bowel sounds [Normal Gait] : normal gait [No Edema] : no edema [No Cyanosis] : no cyanosis [No Clubbing] : no clubbing [No Varicosities] : no varicosities [No Rash] : no rash [No Skin Lesions] : no skin lesions [Moves all extremities] : moves all extremities [No Focal Deficits] : no focal deficits [Normal Speech] : normal speech [Alert and Oriented] : alert and oriented [Normal memory] : normal memory

## 2022-12-07 ENCOUNTER — OFFICE VISIT (OUTPATIENT)
Dept: PAIN MANAGEMENT | Age: 82
End: 2022-12-07
Payer: MEDICARE

## 2022-12-07 VITALS
HEIGHT: 68 IN | BODY MASS INDEX: 23.19 KG/M2 | DIASTOLIC BLOOD PRESSURE: 74 MMHG | OXYGEN SATURATION: 91 % | WEIGHT: 153 LBS | SYSTOLIC BLOOD PRESSURE: 124 MMHG | HEART RATE: 90 BPM | TEMPERATURE: 96.8 F

## 2022-12-07 DIAGNOSIS — M71.38 SYNOVIAL CYST OF LUMBAR FACET JOINT: ICD-10-CM

## 2022-12-07 DIAGNOSIS — Z87.81 H/O COMPRESSION FRACTURE OF SPINE: ICD-10-CM

## 2022-12-07 DIAGNOSIS — M48.061 SPINAL STENOSIS OF LUMBAR REGION, UNSPECIFIED WHETHER NEUROGENIC CLAUDICATION PRESENT: ICD-10-CM

## 2022-12-07 DIAGNOSIS — M51.36 DDD (DEGENERATIVE DISC DISEASE), LUMBAR: Primary | ICD-10-CM

## 2022-12-07 PROCEDURE — 1090F PRES/ABSN URINE INCON ASSESS: CPT | Performed by: ANESTHESIOLOGY

## 2022-12-07 PROCEDURE — G8427 DOCREV CUR MEDS BY ELIG CLIN: HCPCS | Performed by: ANESTHESIOLOGY

## 2022-12-07 PROCEDURE — 3078F DIAST BP <80 MM HG: CPT | Performed by: ANESTHESIOLOGY

## 2022-12-07 PROCEDURE — G8399 PT W/DXA RESULTS DOCUMENT: HCPCS | Performed by: ANESTHESIOLOGY

## 2022-12-07 PROCEDURE — 99204 OFFICE O/P NEW MOD 45 MIN: CPT | Performed by: ANESTHESIOLOGY

## 2022-12-07 PROCEDURE — G8420 CALC BMI NORM PARAMETERS: HCPCS | Performed by: ANESTHESIOLOGY

## 2022-12-07 PROCEDURE — G8484 FLU IMMUNIZE NO ADMIN: HCPCS | Performed by: ANESTHESIOLOGY

## 2022-12-07 PROCEDURE — 3074F SYST BP LT 130 MM HG: CPT | Performed by: ANESTHESIOLOGY

## 2022-12-07 PROCEDURE — 1123F ACP DISCUSS/DSCN MKR DOCD: CPT | Performed by: ANESTHESIOLOGY

## 2022-12-07 PROCEDURE — 1036F TOBACCO NON-USER: CPT | Performed by: ANESTHESIOLOGY

## 2022-12-07 PROCEDURE — 99213 OFFICE O/P EST LOW 20 MIN: CPT | Performed by: ANESTHESIOLOGY

## 2022-12-07 RX ORDER — ASPIRIN 81 MG/1
81 TABLET ORAL DAILY
COMMUNITY

## 2022-12-07 RX ORDER — HYDROCODONE BITARTRATE AND ACETAMINOPHEN 5; 325 MG/1; MG/1
1 TABLET ORAL DAILY
COMMUNITY

## 2022-12-07 NOTE — PROGRESS NOTES
Do you currently have any of the following:    Fever: No  Headache:  No  Cough: No  Shortness of breath: No  Exposed to anyone with these symptoms: No                                                                                                                Jose R Palafox presents to the [facility] on 12/7/2022. Jorge Luis Rivero is complaining of pain in back. The pain is constant. The pain is described as nagging. Pain is rated on her best day at a 6, on her worst day at a 8, and on average at a 7 on the VAS scale. She took her last dose of Norco 3 days ago. Jorge Luis Rivero does not have issues with constipation. Any procedures since your last visit: No    She is  on NSAIDS and  is  on anticoagulation medications to include ASA and is managed by Dr. Hanh Vines. Pacemaker or defibrillator: No.    Medication Contract and Consent for Opioid Use Documents Filed       Patient Documents       Type of Document Status Date Received Received By Description    Medication Contract Received 3/12/2019  9:37 AM Roas DAMICO pain management patient agreement 03/12/2019                       There were no vitals taken for this visit. No LMP recorded. Patient has had a hysterectomy.

## 2022-12-07 NOTE — PROGRESS NOTES
[facility]  [address] [facility], [address] [place] [address]  [phone]    Follow up Note      Eva Sweeney     Date of Visit:  12/7/2022    CC:  Patient presents for follow up   Chief Complaint   Patient presents with    Back Pain     Last seen 2019       HPI:  79 y.o. pleasant lady with h/o Low back pain. Failed conservative RX - meds and PT. 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 Has been evaluated by Dr. Dc Lebron and had undergone epidural injection in 2019 with excellent pain relief. I had seen her last in May 2019. She is here for reevaluation. Since the last visit she apparently had L2 vertebral compression fracture and subsequently treated by Dr. Ky Jacobson and she states she has undergone kyphoplasty. I do not have the records from them. She continues to have lower back pain mainly on ambulation. Denies significant lower extremity radicular component. Anticoagulation medications -ASA 81 mg. The patient  has not been on herbal supplements. The patient is not diabetic. Previous treatments:    Physical therapy / HEP. Ongoing HEP noted    Medications    Interventions- had helped       Imaging studies:    MRI nof LS spine: 6/16/2022: ( Apparently had Kyphoplasty sicne this MRI)  Impression   1. Findings consistent with acute/subacute compression fracture of the   superior endplate of L2 with about 35% loss of height. 2. Moderate central canal stenosis at L1-L2.   3. Mild central canal stenosis and bilateral subarticular recess stenosis at   L4-L5. The appearance of this level is markedly improved with apparent   resolution of the large synovial cyst seen at L4-L5 on the prior study. Xray left hip: 10/7/2022:     Stable findings related to total left hip arthroplasty.      MRI LS spine In 1/17/2019:     EXAM: MRI LUMBAR SPINE W WO CONTRAST       COMPARISON: None       HISTORY: Left leg pain        TECHNIQUE: Multiplanar T1 and BREAST SURGERY      biopsied on left breast    COLONOSCOPY      EPIDURAL STEROID INJECTION Left 3/19/2019    LEFT LUMBAR TRANSFORAMINAL EPIDURAL STEROID INJECTION: L4 / L5 performed by Lauro Bobo MD at . [address]      right ankle    HIATAL HERNIA REPAIR N/A 9/13/2022    LAPAROSCOPIC HERNIA HIATAL REPAIR WITH MESH performed by Jeanette Mcfadden MD at [facility] ([facility])  [address] hip     JOINT REPLACEMENT  10/2013    R hip    JOINT REPLACEMENT  03/08/2016    L knee     JOINT REPLACEMENT  11/03/2020    R knee     NERVE BLOCK Left 03/19/2019    left lumbar transforaminal epidural    NERVE BLOCK Left 04/30/2019    lumbar    REVISION TOTAL HIP ARTHROPLASTY Left 3/31/2022    REVISION LEFT TOTAL HIP ([address]) performed by Leila Albarran DO at [facility] [address] Right Oct 2013    with cell saver    TOTAL KNEE ARTHROPLASTY Left 3/8/16    TOTAL KNEE ARTHROPLASTY Right 11/3/2020    TOTAL RIGHT KNEE REPLACEMENT/ ARTHROPLASTY (BERNABE) performed by Leila Albarran DO at [address]       Prior to Admission medications    Medication Sig Start Date End Date Taking? Authorizing Provider   HYDROcodone-acetaminophen (NORCO) 5-325 MG per tablet Take 1 tablet by mouth daily.  Take 1 tablet every 4 hours   Yes Historical Provider, MD   aspirin 81 MG EC tablet Take 81 mg by mouth daily   Yes Historical Provider, MD   amLODIPine-benazepril (LOTREL) 2.5-10 MG per capsule Take 1 capsule by mouth daily 11/30/22  Yes Irene Conteh DO   ibuprofen (ADVIL;MOTRIN) 800 MG tablet Take 1 tablet by mouth every 6 hours as needed for Pain 9/13/22  Yes Jeanette Mcfadden MD   atorvastatin (LIPITOR) 40 MG tablet Take 40 mg by mouth at bedtime   Yes Historical Provider, MD   vitamin D (CHOLECALCIFEROL) 1000 UNITS TABS tablet Take 2,000 Units by mouth daily   Yes Historical Provider, MD   Red Yeast Rice 600 MG CAPS Take 2 capsules by mouth three times a week   Yes Historical Provider, MD   Multiple Vitamins-Minerals (CENTRUM SILVER ADULT 50+ PO) Take  by mouth daily. Yes Historical Provider, MD   calcium-vitamin D (OSCAL-500) 500-200 MG-UNIT per tablet Take 1 tablet by mouth 2 times daily. Yes Historical Provider, MD   NONFORMULARY daily. Tunde Red   Yes Historical Provider, MD   pantoprazole (PROTONIX) 20 MG tablet TAKE 1 TABLET BY MOUTH EVERY DAY  Patient not taking: Reported on 2022   Historical Provider, MD   aspirin 325 MG EC tablet Take 1 tablet by mouth 2 times daily  Patient not taking: Reported on 2022 3/31/22 3/31/23  Mary [place], DO       No Known Allergies    Social History     Socioeconomic History    Marital status:      Spouse name: Not on file    Number of children: Not on file    Years of education: Not on file    Highest education level: Not on file   Occupational History    Not on file   Tobacco Use    Smoking status: Former     Packs/day: 0.25     Years: 3.00     Pack years: 0.75     Types: Cigarettes     Quit date: 9/10/1998     Years since quittin.2    Smokeless tobacco: Never    Tobacco comments:     quit smoking approx 20 yrs ago   Vaping Use    Vaping Use: Never used   Substance and Sexual Activity    Alcohol use:  Yes     Alcohol/week: 1.0 standard drink     Types: 1 Glasses of wine per week     Comment: occasionally    Drug use: No    Sexual activity: Not Currently   Other Topics Concern    Not on file   Social History Narrative    Not on file     Social Determinants of Health     Financial Resource Strain: Low Risk     Difficulty of Paying Living Expenses: Not hard at all   Food Insecurity: No Food Insecurity    Worried About Running Out of Food in the Last Year: Never true    [address] in the Last Year: Never true   Transportation Needs: No Transportation Needs    Lack of Transportation (Medical): No    Lack of Transportation (Non-Medical): No   Physical Activity: Inactive    Days of Exercise per Week: 0 days    Minutes of Exercise per Session: 0 min   Stress: No Stress Concern Present    Feeling of Stress : Only a little   Social Connections: Not on file   Intimate Partner Violence: Not on file   Housing Stability: Not on file       Family History   Problem Relation Age of Onset    Heart Disease Mother        REVIEW OF SYSTEMS:     Hermila Avila denies fever/chills, chest pain, shortness of breath, new bowel or bladder complaints. All other review of systems was negative. PHYSICAL EXAMINATION:      /74   Pulse 90   Temp 96.8 °F (36 °C) (Infrared)   Ht 5' 8\" (1.727 m)   Wt 153 lb (69.4 kg)   SpO2 91%   BMI 23.26 kg/m²     General:      General appearance:  Pleasant and well-hydrated, in no distress and A & O x 3  Build:Normal Weight  Function: Rises from seated position easily and Moves about room without difficulty    HEENT:    Head:normocephalic, atraumatic    Lungs:    Breathing:normal breathing pattern     CVS:     RRR    Abdomen:    Shape:non-distended and normal    Cervical spine:    Inspection:normal  Palpation:tenderness paravertebral muscles, tenderness trapezium, left, right : no  Range of motion:NO pain    Thoracic spine:     Spine inspection:kyphosis +  Palpation:No tenderness over the midline and paraspinal area, bilaterally  Range of motion:normal in flexion, extension rotation bilateral and is not painful. Lumbar spine:    Spine inspection: Kypho/ scoliosis +  Palpation: Tenderness paravertebral muscles Yes bilaterally  Range of motion: Decreased,  SLR : negative bilaterally    Musculoskeletal:    Trigger points No     Extremities:    Tremors:None bilaterally upper and lower  Edema:no    Neurological:    Sensory: Normal to light touch     Motor:   No focal deficits      Dermatology:    Skin:no rashes or lesions noted     Assessment/Plan:   Diagnosis Orders   1.  DDD (degenerative disc disease), lumbar  XR LUMBAR SPINE (MIN 4 VIEWS)      2. Spinal stenosis of lumbar region, unspecified whether neurogenic claudication present  XR LUMBAR SPINE (MIN 4 VIEWS)      3. Synovial cyst of lumbar facet joint        4. H/O compression fracture of spine  XR LUMBAR SPINE (MIN 4 VIEWS)        Chronic low back pain. Facet cyst/ SS- s/p EYAD in 2019 excellent pain relief. Has not followed up since May 2019. Had pain in June 2022- MRI form June reviewed personally    S/p L2 VCF- apparently had Kyphoplasty by Dr. Silvio Owen in June 2022. I do not have records. Tenderness + over the lumbar pain. Paraspinal area. Will get X-ray SL spine. Need to review records from Dr Silvio Owen- will get it faxed. She has f/u with Dr. Silvio Owen. If continued pain, Consider repeat MRI of LS spine and spine interventions- discussed in detail about this. On ASA- 81 mg. Need to hold for spine interventions. Compound cream for local use- use instructions reviewed. PT/ HEP. Counseling Reg: regular HEP, treatment plan. Sharmin Barboza MD    CC:   Jimmie Richard, DO  [address]   [place] [id number]

## 2022-12-13 ENCOUNTER — TELEPHONE (OUTPATIENT)
Dept: PAIN MANAGEMENT | Age: 82
End: 2022-12-13

## 2022-12-13 NOTE — TELEPHONE ENCOUNTER
Doctor signed form for compound cream.  Faxed forms. Called patient to let her know she will be getting call from pharmacy.

## 2022-12-13 NOTE — TELEPHONE ENCOUNTER
[facility] called in stating she hasn't received her compound cream.  I'm going to put the form on your desk, can you sign please?

## 2022-12-21 ENCOUNTER — TELEPHONE (OUTPATIENT)
Dept: PAIN MANAGEMENT | Age: 82
End: 2022-12-21

## 2022-12-21 NOTE — TELEPHONE ENCOUNTER
Elizabeth Rodriges called in, she has not received anything about her compound cream.  I did call the pharmacy, they do not have anything on file, so I did re fax the script.

## 2022-12-28 NOTE — TELEPHONE ENCOUNTER
[facility] pharmacy did reach out to the patient. She cannot afford 65.00 for the compound cream.  Is there something else you would suggest that she is able to use.

## 2023-01-06 ENCOUNTER — OFFICE VISIT (OUTPATIENT)
Dept: PAIN MANAGEMENT | Age: 83
End: 2023-01-06
Payer: MEDICARE

## 2023-01-06 VITALS
HEIGHT: 68 IN | DIASTOLIC BLOOD PRESSURE: 70 MMHG | BODY MASS INDEX: 22.73 KG/M2 | SYSTOLIC BLOOD PRESSURE: 130 MMHG | WEIGHT: 150 LBS | TEMPERATURE: 96.9 F | RESPIRATION RATE: 18 BRPM

## 2023-01-06 DIAGNOSIS — M48.061 SPINAL STENOSIS OF LUMBAR REGION, UNSPECIFIED WHETHER NEUROGENIC CLAUDICATION PRESENT: ICD-10-CM

## 2023-01-06 DIAGNOSIS — M71.38 SYNOVIAL CYST OF LUMBAR FACET JOINT: ICD-10-CM

## 2023-01-06 DIAGNOSIS — M51.36 DDD (DEGENERATIVE DISC DISEASE), LUMBAR: Primary | ICD-10-CM

## 2023-01-06 DIAGNOSIS — M47.817 LUMBOSACRAL SPONDYLOSIS WITHOUT MYELOPATHY: ICD-10-CM

## 2023-01-06 PROCEDURE — G8420 CALC BMI NORM PARAMETERS: HCPCS | Performed by: ANESTHESIOLOGY

## 2023-01-06 PROCEDURE — 1036F TOBACCO NON-USER: CPT | Performed by: ANESTHESIOLOGY

## 2023-01-06 PROCEDURE — G8484 FLU IMMUNIZE NO ADMIN: HCPCS | Performed by: ANESTHESIOLOGY

## 2023-01-06 PROCEDURE — 99213 OFFICE O/P EST LOW 20 MIN: CPT | Performed by: ANESTHESIOLOGY

## 2023-01-06 PROCEDURE — G8399 PT W/DXA RESULTS DOCUMENT: HCPCS | Performed by: ANESTHESIOLOGY

## 2023-01-06 PROCEDURE — 1090F PRES/ABSN URINE INCON ASSESS: CPT | Performed by: ANESTHESIOLOGY

## 2023-01-06 PROCEDURE — G8427 DOCREV CUR MEDS BY ELIG CLIN: HCPCS | Performed by: ANESTHESIOLOGY

## 2023-01-06 PROCEDURE — 3075F SYST BP GE 130 - 139MM HG: CPT | Performed by: ANESTHESIOLOGY

## 2023-01-06 PROCEDURE — 3078F DIAST BP <80 MM HG: CPT | Performed by: ANESTHESIOLOGY

## 2023-01-06 PROCEDURE — 1123F ACP DISCUSS/DSCN MKR DOCD: CPT | Performed by: ANESTHESIOLOGY

## 2023-01-06 NOTE — PROGRESS NOTES
Do you currently have any of the following:    Fever: No  Headache:  No  Cough: No  Shortness of breath: No  Exposed to anyone with these symptoms: No                                                                                                                Radha Unger presents to the [facility] on 1/6/2023. Adrienne Fernandez is complaining of pain in her lower back. The pain is intermittent. The pain is described as aching. Pain is rated on her best day at a 0, on her worst day at a 8, and on average at a 5 on the VAS scale. She took her last dose of tylenol and diclofenac cream yesterday. Adrienne Fernandez does not have issues with constipation. Any procedures since your last visit: No,     She is  on NSAIDS and  is  on anticoagulation medications to include ASA and is managed by Vivi Wright DO  . Pacemaker or defibrillator: No Physician managing device is NA. Medication Contract and Consent for Opioid Use Documents Filed       Patient Documents       Type of Document Status Date Received Received By Description    Medication Contract Received 3/12/2019  9:37 AM Barbra DAMICO pain management patient agreement 03/12/2019                       /70   Temp 96.9 °F (36.1 °C) (Infrared)   Resp 18   Ht 5' 8\" (1.727 m)   Wt 150 lb (68 kg)   BMI 22.81 kg/m²      No LMP recorded. Patient has had a hysterectomy.

## 2023-01-06 NOTE — PROGRESS NOTES
[address]  [address] [facility], [address] [place] [address] [place]  [phone]    Follow up Note      Pat More     Date of Visit:  1/6/2023    CC:  Patient presents for follow up   Chief Complaint   Patient presents with    Back Pain              HPI:  79 y.o. pleasant lady with h/o Low back pain. Failed conservative RX - meds and PT. 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 Has been evaluated by Dr. Callum Navarro and had undergone epidural injection in 2019 with excellent pain relief. She apparently had L2 vertebral compression fracture and subsequently treated by Dr. Gail Ley and she states she has undergone Kyphoplasty. I do not have the records from them. Denies significant lower extremity radicular component. Anticoagulation medications -ASA 81 mg. The patient  has not been on herbal supplements. The patient is not diabetic. Previous treatments:    Physical therapy / HEP. Ongoing HEP noted    Medications    Interventions- had helped     Imaging studies:    MRI of LS spine: 6/16/2022: (Apparently had Kyphoplasty since this MRI)  Impression   1. Findings consistent with acute/subacute compression fracture of the   superior endplate of L2 with about 35% loss of height. 2. Moderate central canal stenosis at L1-L2.   3. Mild central canal stenosis and bilateral subarticular recess stenosis at   L4-L5. The appearance of this level is markedly improved with apparent   resolution of the large synovial cyst seen at L4-L5 on the prior study. Xray left hip: 10/7/2022:     Stable findings related to total left hip arthroplasty. MRI LS spine In 1/17/2019:     EXAM: MRI LUMBAR SPINE W WO CONTRAST       COMPARISON: None       HISTORY: Left leg pain        TECHNIQUE: Multiplanar T1 and T2-weighted MRI images of the lumbar   spine were performed. CONTRAST: 20 mL of gadolinium based contrast was administered.        FINDINGS:        BONE MARROW SIGNAL: There is normal marrow signal.       T12-L1: Normal T12-L1       L1-L2: Normal L1-L2       L2-L3: Normal L2-L3       L3-L4: Normal L3-L4       L4-L5: There is a large heterogeneous high signal T2 mass within the   central canal eccentrically on the left. This is noted adjacent to the   synovial facet joint and measures 1.3 x 0.9 cm. This results in severe   impingement upon the thecal sac and nerve roots. There is peripheral   rim-like enhancement on the postcontrast images. No extension into the   foramen. There is a superimposed disc bulge with facet hypertrophy   which contributes to the amount of canal narrowing. L5-S1: Facet hypertrophy without central or foraminal stenosis. SOFT TISSUES: The visualized paravertebral soft tissues are within   normal limits. Impression   Large rim-enhancing high signal lesion in the L4-5 canal measuring 1.3   x 0.9 cm, likely extradural in location. This is most likely   representing a synovial facet cyst resulting in severe mass effect   upon the thecal sac. Other etiologies such as meningioma or nerve   sheath tumor considered less likely given the lack of homogeneous   enhancement. Potential Aberrant Drug-Related Behavior:  No    Urine Drug Screening:No    OARRS report today: Yes reviewed. Past Medical History:   Diagnosis Date    Arthritis     right hip    Cancer ([address]     breast 1997 approx. left    Hyperlipidemia     Hypertension     Osteoarthrosis     Osteoporosis     Other chronic postoperative pain     Undiagnosed cardiac murmurs        Past Surgical History:   Procedure Laterality Date    ANESTHESIA NERVE BLOCK Left 4/30/2019    LUMBAR EPIDURAL STERIOD INJECTION L4-5  (LEFT PARAMEDIAN APPROACH) performed by Stella Billy MD at [address] [place] [address]      biopsied on left breast    COLONOSCOPY      EPIDURAL STEROID INJECTION Left 3/19/2019    LEFT LUMBAR TRANSFORAMINAL EPIDURAL STEROID INJECTION: L4 / L5 performed by Shweta Mccarthy MD at [address]      right ankle    HIATAL HERNIA REPAIR N/A 9/13/2022    LAPAROSCOPIC HERNIA HIATAL REPAIR WITH MESH performed by Florin Morin MD at [address] ([facility])  [address]  hip     JOINT REPLACEMENT  10/2013    R hip    JOINT REPLACEMENT  03/08/2016    L knee     JOINT REPLACEMENT  11/03/2020    R knee     NERVE BLOCK Left 03/19/2019    left lumbar transforaminal epidural    NERVE BLOCK Left 04/30/2019    lumbar    REVISION TOTAL HIP ARTHROPLASTY Left 3/31/2022    REVISION LEFT TOTAL HIP ([address]) performed by Shira Vogel DO at [address] [facility] [facility] Right Oct 2013    with cell saver    TOTAL KNEE ARTHROPLASTY Left 3/8/16    TOTAL KNEE ARTHROPLASTY Right 11/3/2020    TOTAL RIGHT KNEE REPLACEMENT/ ARTHROPLASTY (BERNABE) performed by Shira Vogel DO at [address]       Prior to Admission medications    Medication Sig Start Date End Date Taking? Authorizing Provider   diclofenac sodium (VOLTAREN) 1 % GEL Apply 2 g topically 4 times daily 12/29/22  Yes Shweta Mccarthy MD   atorvastatin (LIPITOR) 40 MG tablet TAKE 1 TABLET BY MOUTH MONDAY, [facility] AND FRIDAY 12/12/22  Yes Jorje Spencer DO   aspirin 81 MG EC tablet Take 81 mg by mouth daily   Yes Historical Provider, MD   amLODIPine-benazepril (LOTREL) 2.5-10 MG per capsule Take 1 capsule by mouth daily 11/30/22  Yes Jorje Spencer DO   vitamin D (CHOLECALCIFEROL) 1000 UNITS TABS tablet Take 2,000 Units by mouth daily   Yes Historical Provider, MD   Red Yeast Rice 600 MG CAPS Take 2 capsules by mouth three times a week   Yes Historical Provider, MD   Multiple Vitamins-Minerals (CENTRUM SILVER ADULT 50+ PO) Take  by mouth daily. Yes Historical Provider, MD   calcium-vitamin D (OSCAL-500) 500-200 MG-UNIT per tablet Take 1 tablet by mouth 2 times daily.    Yes Historical Provider, MD   NONFORMULARY daily. Tunde Red   Yes Historical Provider, MD   amoxicillin-clavulanate (AUGMENTIN) 875-125 MG per tablet Take 1 tablet by mouth in the morning and at bedtime  Patient not taking: Reported on 22   Historical Provider, MD   ciprofloxacin (CIPRO) 500 MG tablet Take 1 tablet by mouth 2 times daily for 5 days  Patient not taking: Reported on 2023 1/3/23 1/8/23  Mercy Morrison DO   pantoprazole (PROTONIX) 20 MG tablet  22   Historical Provider, MD       No Known Allergies    Social History     Socioeconomic History    Marital status:      Spouse name: Not on file    Number of children: Not on file    Years of education: Not on file    Highest education level: Not on file   Occupational History    Not on file   Tobacco Use    Smoking status: Former     Packs/day: 0.25     Years: 3.00     Pack years: 0.75     Types: Cigarettes     Quit date: 9/10/1998     Years since quittin.3    Smokeless tobacco: Never    Tobacco comments:     quit smoking approx 20 yrs ago   Vaping Use    Vaping Use: Never used   Substance and Sexual Activity    Alcohol use: Yes     Alcohol/week: 1.0 standard drink     Types: 1 Glasses of wine per week     Comment: occasionally    Drug use: No    Sexual activity: Not Currently   Other Topics Concern    Not on file   Social History Narrative    Not on file     Social Determinants of Health     Financial Resource Strain: Low Risk     Difficulty of Paying Living Expenses: Not hard at all   Food Insecurity: No Food Insecurity    Worried About Running Out of Food in the Last Year: Never true    [address] in the Last Year: Never true   Transportation Needs: No Transportation Needs    Lack of Transportation (Medical): No    Lack of Transportation (Non-Medical):  No   Physical Activity: Inactive    Days of Exercise per Week: 0 days    Minutes of Exercise per Session: 0 min   Stress: No Stress Concern Present    Feeling of Stress : Only a little   Social Connections: Not on file   Intimate Partner Violence: Not on file   Housing Stability: Not on file       Family History   Problem Relation Age of Onset    Heart Disease Mother        REVIEW OF SYSTEMS:     Nicki Goncalves denies fever/chills, chest pain, shortness of breath, new bowel or bladder complaints. All other review of systems was negative. PHYSICAL EXAMINATION:      /70   Temp 96.9 °F (36.1 °C) (Infrared)   Resp 18   Ht 5' 8\" (1.727 m)   Wt 150 lb (68 kg)   BMI 22.81 kg/m²     General:      General appearance:  Pleasant and well-hydrated, in no distress and A & O x 3  Build:Normal Weight  Function: Rises from seated position easily and Moves about room without difficulty    HEENT:    Head:normocephalic, atraumatic    Lungs:    Breathing:normal breathing pattern     CVS:     RRR    Abdomen:    Shape:non-distended and normal    Cervical spine:    Inspection:normal    Thoracic spine:     Spine inspection:kyphosis +  Palpation:No tenderness over the midline and paraspinal area, bilaterally  Range of motion:normal in flexion, extension rotation bilateral and is not painful. Lumbar spine:    Spine inspection: Kypho/ scoliosis +  Palpation: Tenderness paravertebral muscles Yes bilaterally  Range of motion: Decreased,  SLR : negative bilaterally    Musculoskeletal:    Trigger points No     Extremities:    Tremors:None bilaterally upper and lower  Edema:no    Neurological:    Sensory: Normal to light touch     Motor:   No focal deficits    Dermatology:    Skin:no rashes or lesions noted     Assessment/Plan:   Diagnosis Orders   1. DDD (degenerative disc disease), lumbar        2. Spinal stenosis of lumbar region, unspecified whether neurogenic claudication present        3. Lumbosacral spondylosis without myelopathy        4. Synovial cyst of lumbar facet joint          Chronic low back pain. Facet cyst/ SS- S/p EYAD in 2019 excellent pain relief.     Had pain in June 2022- MRI form June reviewed personally    S/p L2 VCF- apparently had Kyphoplasty by Dr. So Damon in June 2022. I do not have records. Tenderness + over the lumbar Paraspinal area. Had ordered X-ray SL spine- did not get - still pending    Need to review records from Dr So Damon. She had followed up with Dr. So Damon. Compound cream helping well. Pain is significantly better. Over all she is doing well. If continued pain, Consider repeat MRI of LS spine and spine interventions- discussed in detail about this. On ASA- 81 mg. Need to hold for spine interventions. Counseling Reg: regular HEP, treatment plan. F/U PRN    Hattie Pretty MD    CC:   Leilani Gonzalez, DO  [address]   [facility] [address]

## 2023-02-01 ENCOUNTER — TRANSCRIBE ORDERS (OUTPATIENT)
Dept: ADMINISTRATIVE | Age: 83
End: 2023-02-01

## 2023-02-01 DIAGNOSIS — C79.9 METASTATIC MALIGNANT NEOPLASM, UNSPECIFIED SITE (HCC): Primary | ICD-10-CM

## 2023-02-01 DIAGNOSIS — C79.51 SECONDARY MALIGNANT NEOPLASM OF BONE AND BONE MARROW (HCC): ICD-10-CM

## 2023-02-01 DIAGNOSIS — C79.52 SECONDARY MALIGNANT NEOPLASM OF BONE AND BONE MARROW (HCC): ICD-10-CM

## 2023-02-24 ENCOUNTER — HOSPITAL ENCOUNTER (OUTPATIENT)
Dept: CT IMAGING | Age: 83
Discharge: HOME | End: 2023-02-24
Payer: MEDICARE

## 2023-02-24 ENCOUNTER — HOSPITAL ENCOUNTER (OUTPATIENT)
Dept: NUCLEAR MEDICINE | Age: 83
Discharge: HOME | End: 2023-02-24

## 2023-02-24 DIAGNOSIS — N13.30 HYDRONEPHROSIS, UNSPECIFIED HYDRONEPHROSIS TYPE: ICD-10-CM

## 2023-02-24 DIAGNOSIS — C79.52 SECONDARY MALIGNANT NEOPLASM OF BONE AND BONE MARROW (HCC): ICD-10-CM

## 2023-02-24 DIAGNOSIS — C79.9 METASTATIC MALIGNANT NEOPLASM, UNSPECIFIED SITE (HCC): ICD-10-CM

## 2023-02-24 DIAGNOSIS — C79.51 SECONDARY MALIGNANT NEOPLASM OF BONE AND BONE MARROW (HCC): ICD-10-CM

## 2023-02-24 DIAGNOSIS — N32.89 BLADDER DISTENSION: ICD-10-CM

## 2023-02-24 PROCEDURE — 74176 CT ABD & PELVIS W/O CONTRAST: CPT

## 2023-03-15 ENCOUNTER — OFFICE VISIT (OUTPATIENT)
Dept: PAIN MANAGEMENT | Age: 83
End: 2023-03-15
Payer: MEDICARE

## 2023-03-15 ENCOUNTER — PREP FOR PROCEDURE (OUTPATIENT)
Dept: PAIN MANAGEMENT | Age: 83
End: 2023-03-15

## 2023-03-15 VITALS
OXYGEN SATURATION: 97 % | TEMPERATURE: 96.9 F | WEIGHT: 150 LBS | HEART RATE: 84 BPM | SYSTOLIC BLOOD PRESSURE: 134 MMHG | DIASTOLIC BLOOD PRESSURE: 70 MMHG | BODY MASS INDEX: 22.73 KG/M2 | HEIGHT: 68 IN

## 2023-03-15 DIAGNOSIS — Z98.890 S/P KYPHOPLASTY: ICD-10-CM

## 2023-03-15 DIAGNOSIS — M51.36 DDD (DEGENERATIVE DISC DISEASE), LUMBAR: Primary | ICD-10-CM

## 2023-03-15 DIAGNOSIS — M48.061 SPINAL STENOSIS OF LUMBAR REGION, UNSPECIFIED WHETHER NEUROGENIC CLAUDICATION PRESENT: ICD-10-CM

## 2023-03-15 DIAGNOSIS — M47.817 LUMBOSACRAL SPONDYLOSIS WITHOUT MYELOPATHY: ICD-10-CM

## 2023-03-15 PROCEDURE — G8427 DOCREV CUR MEDS BY ELIG CLIN: HCPCS | Performed by: ANESTHESIOLOGY

## 2023-03-15 PROCEDURE — 1123F ACP DISCUSS/DSCN MKR DOCD: CPT | Performed by: ANESTHESIOLOGY

## 2023-03-15 PROCEDURE — 99213 OFFICE O/P EST LOW 20 MIN: CPT | Performed by: ANESTHESIOLOGY

## 2023-03-15 PROCEDURE — 1036F TOBACCO NON-USER: CPT | Performed by: ANESTHESIOLOGY

## 2023-03-15 PROCEDURE — G8484 FLU IMMUNIZE NO ADMIN: HCPCS | Performed by: ANESTHESIOLOGY

## 2023-03-15 PROCEDURE — G8399 PT W/DXA RESULTS DOCUMENT: HCPCS | Performed by: ANESTHESIOLOGY

## 2023-03-15 PROCEDURE — 3074F SYST BP LT 130 MM HG: CPT | Performed by: ANESTHESIOLOGY

## 2023-03-15 PROCEDURE — 3078F DIAST BP <80 MM HG: CPT | Performed by: ANESTHESIOLOGY

## 2023-03-15 PROCEDURE — 99214 OFFICE O/P EST MOD 30 MIN: CPT | Performed by: ANESTHESIOLOGY

## 2023-03-15 PROCEDURE — 1090F PRES/ABSN URINE INCON ASSESS: CPT | Performed by: ANESTHESIOLOGY

## 2023-03-15 PROCEDURE — G8420 CALC BMI NORM PARAMETERS: HCPCS | Performed by: ANESTHESIOLOGY

## 2023-03-15 RX ORDER — SODIUM CHLORIDE 9 MG/ML
INJECTION, SOLUTION INTRAVENOUS PRN
Status: CANCELLED | OUTPATIENT
Start: 2023-03-15

## 2023-03-15 RX ORDER — SODIUM CHLORIDE 0.9 % (FLUSH) 0.9 %
5-40 SYRINGE (ML) INJECTION EVERY 12 HOURS SCHEDULED
Status: CANCELLED | OUTPATIENT
Start: 2023-03-15

## 2023-03-15 RX ORDER — SODIUM CHLORIDE 0.9 % (FLUSH) 0.9 %
5-40 SYRINGE (ML) INJECTION PRN
Status: CANCELLED | OUTPATIENT
Start: 2023-03-15

## 2023-03-15 NOTE — PROGRESS NOTES
Do you currently have any of the following:    Fever: No  Headache:  No  Cough: No  Shortness of breath: No  Exposed to anyone with these symptoms: No                                                                                                                Jadwin Eyal presents to the [facility] on 3/15/2023. Alona Richter is complaining of pain in back. The pain is constant. The pain is described as aching. Pain is rated on her best day at a 6, on her worst day at a 8, and on average at a 7 on the VAS scale. She took her last dose of  Voltaren  daily. Alona Richter does not have issues with constipation. Any procedures since your last visit: No.    She is  on NSAIDS and  is  on anticoagulation medications to include none and is managed by none. Pacemaker or defibrillator: No.    Medication Contract and Consent for Opioid Use Documents Filed       Patient Documents       Type of Document Status Date Received Received By Description    Medication Contract Received 3/12/2019  9:37 AM Matt DAMICO pain management patient agreement 03/12/2019                       There were no vitals taken for this visit. No LMP recorded. Patient has had a hysterectomy.

## 2023-03-15 NOTE — PROGRESS NOTES
[facility]  [address] [facility], [address] [place] [address]  [phone]    Follow up Note      Álvaro Goldsmith     Date of Visit:  3/15/2023    CC:  Patient presents for follow up   Chief Complaint   Patient presents with    Back Pain       HPI:  79 y.o. pleasant lady with h/o Low back pain. Failed conservative RX - meds and PT. 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 Has been evaluated by Dr. Kelly Morales. Had undergone epidural injection in 2019 with excellent pain relief. She apparently had L1 & L2 vertebral compression fracture and subsequently treated by Dr. Luzma Bhatti and she states she has undergone Kyphoplasty. Denies significant lower extremity radicular component. Anticoagulation medications -ASA 81 mg. The patient  has not been on herbal supplements. The patient is not diabetic. Previous treatments:    Physical therapy / HEP. Ongoing HEP noted    Medications    Interventions- had helped     Imaging studies:    NM bone scan: 1/19/2023:  Impression             Nuclear bone scan:      1. Focal area of abnormal uptake in the right skull. 2. Scattered abnormal uptake in the L1, L2, L5 vertebral bodies. 3. Distension of the right renal collecting system. MRI of LS spien 9/2022:( detailed report scanned in media secton)        MRI of LS spine: 6/16/2022: (Apparently had Kyphoplasty since this MRI)  Impression   1. Findings consistent with acute/subacute compression fracture of the   superior endplate of L2 with about 35% loss of height. 2. Moderate central canal stenosis at L1-L2.   3. Mild central canal stenosis and bilateral subarticular recess stenosis at   L4-L5. The appearance of this level is markedly improved with apparent   resolution of the large synovial cyst seen at L4-L5 on the prior study. Xray left hip: 10/7/2022:     Stable findings related to total left hip arthroplasty.      MRI LS spine In 1/17/2019:     EXAM: MRI LUMBAR SPINE W WO CONTRAST       COMPARISON: None       HISTORY: Left leg pain        TECHNIQUE: Multiplanar T1 and T2-weighted MRI images of the lumbar   spine were performed. CONTRAST: 20 mL of gadolinium based contrast was administered. FINDINGS:        BONE MARROW SIGNAL: There is normal marrow signal.       T12-L1: Normal T12-L1       L1-L2: Normal L1-L2       L2-L3: Normal L2-L3       L3-L4: Normal L3-L4       L4-L5: There is a large heterogeneous high signal T2 mass within the   central canal eccentrically on the left. This is noted adjacent to the   synovial facet joint and measures 1.3 x 0.9 cm. This results in severe   impingement upon the thecal sac and nerve roots. There is peripheral   rim-like enhancement on the postcontrast images. No extension into the   foramen. There is a superimposed disc bulge with facet hypertrophy   which contributes to the amount of canal narrowing. L5-S1: Facet hypertrophy without central or foraminal stenosis. SOFT TISSUES: The visualized paravertebral soft tissues are within   normal limits. Impression   Large rim-enhancing high signal lesion in the L4-5 canal measuring 1.3   x 0.9 cm, likely extradural in location. This is most likely   representing a synovial facet cyst resulting in severe mass effect   upon the thecal sac. Other etiologies such as meningioma or nerve   sheath tumor considered less likely given the lack of homogeneous   enhancement. Potential Aberrant Drug-Related Behavior:  No    Urine Drug Screening:No    OARRS report today: Yes reviewed. Past Medical History:   Diagnosis Date    Arthritis     right hip    Cancer ([address]     breast 1997 approx. left    Hyperlipidemia     Hypertension     Osteoarthrosis     Osteoporosis     Other chronic postoperative pain     Undiagnosed cardiac murmurs        Past Surgical History:   Procedure Laterality Date    ANESTHESIA NERVE BLOCK Left 4/30/2019 LUMBAR EPIDURAL STERIOD INJECTION L4-5  (LEFT PARAMEDIAN APPROACH) performed by Alicia Lawrence MD at [facility]      biopsied on left breast    COLONOSCOPY      EPIDURAL STEROID INJECTION Left 3/19/2019    LEFT LUMBAR TRANSFORAMINAL EPIDURAL STEROID INJECTION: L4 / L5 performed by Alicia Lawrence MD at . [address]      right ankle    HIATAL HERNIA REPAIR N/A 9/13/2022    LAPAROSCOPIC HERNIA HIATAL REPAIR WITH MESH performed by Jayden Morrow MD at [address] ([facility])  [address] hip     JOINT REPLACEMENT  10/2013    R hip    JOINT REPLACEMENT  03/08/2016    L knee     JOINT REPLACEMENT  11/03/2020    R knee     NERVE BLOCK Left 03/19/2019    left lumbar transforaminal epidural    NERVE BLOCK Left 04/30/2019    lumbar    REVISION TOTAL HIP ARTHROPLASTY Left 3/31/2022    REVISION LEFT TOTAL HIP ([address]) performed by Sonido Soto DO at [facility] [address] Right Oct 2013    with cell saver    TOTAL KNEE ARTHROPLASTY Left 3/8/16    TOTAL KNEE ARTHROPLASTY Right 11/3/2020    TOTAL RIGHT KNEE REPLACEMENT/ ARTHROPLASTY (BERNABE) performed by Sonido Soto DO at [address]       Prior to Admission medications    Medication Sig Start Date End Date Taking?  Authorizing Provider   diclofenac sodium (VOLTAREN) 1 % GEL Apply 2 g topically 4 times daily 12/29/22  Yes Alicia Lawrence MD   atorvastatin (LIPITOR) 40 MG tablet TAKE 1 TABLET BY MOUTH MONDAY, [facility] AND FRIDAY 12/12/22  Yes Opal Herrera DO   aspirin 81 MG EC tablet Take 81 mg by mouth daily   Yes Historical Provider, MD   pantoprazole (PROTONIX) 20 MG tablet  11/2/22  Yes Historical Provider, MD   amLODIPine-benazepril (LOTREL) 2.5-10 MG per capsule Take 1 capsule by mouth daily 11/30/22  Yes Opal Herrera DO   vitamin D (CHOLECALCIFEROL) 1000 UNITS TABS tablet Take 2,000 Units by mouth daily   Yes Historical Provider, MD   Red Yeast Rice 600 MG CAPS Take 2 capsules by mouth three times a week   Yes Historical Provider, MD   Multiple Vitamins-Minerals (CENTRUM SILVER ADULT 50+ PO) Take  by mouth daily. Yes Historical Provider, MD   calcium-vitamin D (OSCAL-500) 500-200 MG-UNIT per tablet Take 1 tablet by mouth 2 times daily. Yes Historical Provider, MD   NONFORMULARY daily. Tunde Red   Yes Historical Provider, MD   amoxicillin-clavulanate (AUGMENTIN) 875-125 MG per tablet Take 1 tablet by mouth in the morning and at bedtime  Patient not taking: Reported on 3/15/2023 12/19/22   Historical Provider, MD       No Known Allergies    Social History     Socioeconomic History    Marital status:      Spouse name: Not on file    Number of children: Not on file    Years of education: Not on file    Highest education level: Not on file   Occupational History    Not on file   Tobacco Use    Smoking status: Former     Packs/day: 0.25     Years: 3.00     Pack years: 0.75     Types: Cigarettes     Quit date: 9/10/1998     Years since quittin.5    Smokeless tobacco: Never    Tobacco comments:     quit smoking approx 20 yrs ago   Vaping Use    Vaping Use: Never used   Substance and Sexual Activity    Alcohol use: Yes     Alcohol/week: 1.0 standard drink     Types: 1 Glasses of wine per week     Comment: occasionally    Drug use: No    Sexual activity: Not Currently   Other Topics Concern    Not on file   Social History Narrative    Not on file     Social Determinants of Health     Financial Resource Strain: Low Risk     Difficulty of Paying Living Expenses: Not hard at all   Food Insecurity: No Food Insecurity    Worried About Running Out of Food in the Last Year: Never true    [address] in the Last Year: Never true   Transportation Needs: No Transportation Needs    Lack of Transportation (Medical): No    Lack of Transportation (Non-Medical):  No   Physical Activity: Inactive    Days of Exercise per Week: 0 days    Minutes of Exercise per Session: 0 min   Stress: No Stress Concern Present    Feeling of Stress : Only a little   Social Connections: Not on file   Intimate Partner Violence: Not on file   Housing Stability: Not on file       Family History   Problem Relation Age of Onset    Heart Disease Mother        REVIEW OF SYSTEMS:     Ivory denies fever/chills, chest pain, shortness of breath, new bowel or bladder complaints. All other review of systems was negative.    PHYSICAL EXAMINATION:      /70   Pulse 84   Temp 96.9 °F (36.1 °C) (Infrared)   Ht 5' 8\" (1.727 m)   Wt 150 lb (68 kg)   SpO2 97%   BMI 22.81 kg/m²     General:      General appearance:  Pleasant and well-hydrated, in no distress and A & O x 3  Build:Normal Weight  Function: Rises from seated position easily and Moves about room without difficulty    HEENT:    Head:normocephalic, atraumatic    Lungs:    Breathing:normal breathing pattern     CVS:     RRR    Abdomen:    Shape:non-distended and normal    Cervical spine:    Inspection:normal    Thoracic spine:     Spine inspection:kyphosis +  Palpation:No tenderness over the midline and paraspinal area, bilaterally  Range of motion:normal in flexion, extension rotation bilateral and is not painful.    Lumbar spine:    Spine inspection: Kypho/ scoliosis +  Palpation: Tenderness paravertebral muscles Yes bilaterally  Range of motion: Decreased,  SLR : negative bilaterally    Musculoskeletal:    Trigger points No     Extremities:    Tremors:None bilaterally upper and lower  Edema:no    Neurological:    Sensory: Normal to light touch     Motor:   No focal deficits    Dermatology:    Skin:no rashes or lesions noted     Assessment/Plan:   Diagnosis Orders   1. DDD (degenerative disc disease), lumbar        2. Spinal stenosis of lumbar region, unspecified whether neurogenic claudication present        3. Lumbosacral spondylosis without myelopathy    4. S/P kyphoplasty          Chronic low back pain. Facet cyst/ SS- S/p EYAD in 2019 excellent pain relief. Had pain in June 2022- MRI form June reviewed personally    S/p L1 & L2 VCF- apparently had Kyphoplasty by Dr. Ky Jacobson in June 2022. Records form Dr. Jyothi Catherine office reviewed. Had MRI of LS spine in 9/2022- results reviewed. NM bone scan done in Jan 2023 reviewed- further work up by her PCP regarding the changes. NO spine tenderness. Recent Image findings reviewed. PLAN:  Lumbar TFESI Right L2 & Left L3 under fluoroscopy. RBA discussed. Hold ASA. She will f/u with her PCP reg the changes noted on Bone scan. Aqua therapy    Short course of Ultracet for prn use to use for severe pain. RBA of opioid use discussed. ZT lido for local use. If continued pain, Consider repeat MRI of LS spine. Counseling Reg: regular HEP, treatment plan. We discussed with the patient that combining opioids, benzodiazepines, alcohol, illicit drugs or sleep aids increases the risk of respiratory depression including death. We discussed that these medications may cause drowsiness, sedation or dizziness and have counseled the patient not to drive or operate machinery. We have discussed that these medications will be prescribed only by one provider. We have discussed with the patient about age related risk factors and have thoroughly discussed the importance of taking these medications as prescribed. The patient verbalizes understanding.     Marquise Javier MD    CC:   Lisa Escalante MD  [address] [facility]  [address] [address]

## 2023-03-15 NOTE — PATIENT INSTRUCTIONS
Pain Management Department      Epidural Spinal Steroid Injection:  Epidural steroid injections can be an effective treatment for nerve root pain. Pinched or irritated nerves that exit the spine can cause shooting pain, burning, tingling, numbness, and muscle weakness. This is often caused by a bulging disc, herniated disc (slipped disc), trauma or aging of the spine. Epidural injections can help by placing a steroid medication which is an anti- inflammatory medicine around the irritated nerves to reduce inflammation. What are the different types of epidural spinal injections;  Caudal epidural steroid injections, if symptoms are in the low back and lower extremities. Mostly done for patients who had low back surgery. Lumbar lnterlaminar epidural injections, if symptoms are in the lower back and lower extremities. Thoracic lnterlaminar epidural injections, if symptoms are in the mid back and rib cage. Cervical lnterlaminar epidural injections, if symptoms are in the neck and upper extremities. Pre-procedure Instructions: Your current medications will be reviewed and you will be instructed on which medications to discontinue before the procedure. lf sedation is administered you will be required to fast before the procedure (eight hours) 3- A  will be required if sedation is administered. Procedure:  A local anesthetic will be used to numb your skin. A needle will be advanced under X-ray to the target area. Placement will be confirmed by dye injection after which medicine will be placed, then the needle will be removed and Band-Aid will be applied. Post-procedure: You will be monitored in the recovery room for up to 30 minutes after the injection, when you are ready to leave, the staff will give you the discharge instructions. The extent and duration of pain relief may depend on the amount of disc or nerve root irritation. Other coexisting factors may contribute to your pain.  Sometimes an injection brings several weeks to months of pain relief  and then further treatment is needed.

## 2023-03-20 ENCOUNTER — HOSPITAL ENCOUNTER (EMERGENCY)
Age: 83
Discharge: HOME | End: 2023-03-20
Attending: EMERGENCY MEDICINE
Payer: MEDICARE

## 2023-03-20 VITALS
RESPIRATION RATE: 16 BRPM | TEMPERATURE: 97.1 F | DIASTOLIC BLOOD PRESSURE: 90 MMHG | SYSTOLIC BLOOD PRESSURE: 151 MMHG | OXYGEN SATURATION: 98 % | HEART RATE: 92 BPM

## 2023-03-20 DIAGNOSIS — R30.0 DYSURIA: Primary | ICD-10-CM

## 2023-03-20 LAB
BACTERIA URNS QL MICRO: ABNORMAL /HPF
BILIRUB UR QL STRIP: NEGATIVE
CLARITY UR: CLEAR
COLOR UR: YELLOW
EPI CELLS #/AREA URNS HPF: ABNORMAL /HPF
GLUCOSE UR STRIP-MCNC: NEGATIVE MG/DL
HGB UR QL STRIP: NEGATIVE
KETONES UR STRIP-MCNC: NEGATIVE MG/DL
LEUKOCYTE ESTERASE UR QL STRIP: ABNORMAL
NITRITE UR QL STRIP: NEGATIVE
PH UR STRIP: 6 [PH] (ref 5–9)
PROT UR STRIP-MCNC: NEGATIVE MG/DL
RBC #/AREA URNS HPF: ABNORMAL /HPF (ref 0–2)
REASON FOR REJECTION: NORMAL
REJECTED TEST: NORMAL
SP GR UR STRIP: 1.02 (ref 1–1.03)
UROBILINOGEN UR STRIP-ACNC: 0.2 E.U./DL
WBC #/AREA URNS HPF: ABNORMAL /HPF (ref 0–5)

## 2023-03-20 PROCEDURE — 99283 EMERGENCY DEPT VISIT LOW MDM: CPT

## 2023-03-20 PROCEDURE — 81001 URINALYSIS AUTO W/SCOPE: CPT

## 2023-03-20 PROCEDURE — 87088 URINE BACTERIA CULTURE: CPT

## 2023-03-20 ASSESSMENT — ENCOUNTER SYMPTOMS
DIARRHEA: 0
WHEEZING: 0
COUGH: 0
ABDOMINAL PAIN: 0
SHORTNESS OF BREATH: 0
VOMITING: 0
BACK PAIN: 0
NAUSEA: 0
SORE THROAT: 0
CHEST TIGHTNESS: 0

## 2023-03-20 ASSESSMENT — PAIN SCALES - GENERAL: PAINLEVEL_OUTOF10: 6

## 2023-03-20 ASSESSMENT — PAIN - FUNCTIONAL ASSESSMENT: PAIN_FUNCTIONAL_ASSESSMENT: 0-10

## 2023-03-20 NOTE — ED PROVIDER NOTES
informing me she could not use the bathroom and was tired of waiting and wanted to go, I got her chart ready for discharge and the nurse just approached me telling me that the patient got up and was actually able to urinate enough to send for example, we will flip her back to in process and continue our work-up with the urinalysis. [NC]      ED Course User Index  [NC] Sebastien Mcrae, DO     Urinalysis ultimately obtained, reveals trace leukocytes and rare bacteria with no real other signs of infectious process and patient has no symptoms. We will culture the urine and patient will follow closely with her family doctor, she is very comfortable with this at this time. No current antibiotic use.      --------------------------------------------- PAST HISTORY ---------------------------------------------  Past Medical History:  has a past medical history of Arthritis, Cancer ([facility] [address] Hyperlipidemia, Hypertension, Osteoarthrosis, Osteoporosis, Other chronic postoperative pain, and Undiagnosed cardiac murmurs. Past Surgical History:  has a past surgical history that includes fracture surgery; Breast surgery; Tonsillectomy; Appendectomy; Colonoscopy; eye surgery; Total hip arthroplasty (Right, Oct 2013); Total knee arthroplasty (Left, 3/8/16); Hysterectomy (1979); Nerve Block (Left, 03/19/2019); epidural steroid injection (Left, 3/19/2019); Nerve Block (Left, 04/30/2019); Anesthesia Nerve Block (Left, 4/30/2019); joint replacement; joint replacement (10/2013); joint replacement (03/08/2016); joint replacement (11/03/2020); Total knee arthroplasty (Right, 11/3/2020); Revision total hip arthroplasty (Left, 3/31/2022); and hiatal hernia repair (N/A, 9/13/2022). Social History:  reports that she quit smoking about 24 years ago. Her smoking use included cigarettes. She has a 0.75 pack-year smoking history. She has never used smokeless tobacco. She reports current alcohol use of about 1.0 standard drink per week.  She

## 2023-03-22 LAB — BACTERIA UR CULT: NORMAL

## 2023-03-29 ENCOUNTER — HOSPITAL ENCOUNTER (OUTPATIENT)
Age: 83
Discharge: HOME | End: 2023-03-29
Payer: MEDICARE

## 2023-03-29 LAB
ALBUMIN SERPL-MCNC: 4.3 G/DL (ref 3.5–5.2)
ALP SERPL-CCNC: 47 U/L (ref 35–104)
ALT SERPL-CCNC: 19 U/L (ref 0–32)
ANION GAP SERPL CALCULATED.3IONS-SCNC: 13 MMOL/L (ref 7–16)
AST SERPL-CCNC: 22 U/L (ref 0–31)
BACTERIA URNS QL MICRO: ABNORMAL /HPF
BASOPHILS # BLD: 0.05 E9/L (ref 0–0.2)
BASOPHILS NFR BLD: 0.8 % (ref 0–2)
BILIRUB SERPL-MCNC: 0.5 MG/DL (ref 0–1.2)
BILIRUB UR QL STRIP: NEGATIVE
BUN SERPL-MCNC: 14 MG/DL (ref 6–23)
CALCIUM SERPL-MCNC: 9.9 MG/DL (ref 8.6–10.2)
CHLORIDE SERPL-SCNC: 103 MMOL/L (ref 98–107)
CHOLESTEROL, TOTAL: 182 MG/DL (ref 0–199)
CLARITY UR: CLEAR
CO2 SERPL-SCNC: 26 MMOL/L (ref 22–29)
COLOR UR: YELLOW
CREAT SERPL-MCNC: 0.7 MG/DL (ref 0.5–1)
EOSINOPHIL # BLD: 0.19 E9/L (ref 0.05–0.5)
EOSINOPHIL NFR BLD: 2.9 % (ref 0–6)
EPI CELLS #/AREA URNS HPF: ABNORMAL /HPF
ERYTHROCYTE [DISTWIDTH] IN BLOOD BY AUTOMATED COUNT: 13.8 FL (ref 11.5–15)
GLUCOSE FASTING: 106 MG/DL (ref 74–99)
GLUCOSE UR STRIP-MCNC: NEGATIVE MG/DL
HCT VFR BLD AUTO: 43.8 % (ref 34–48)
HDLC SERPL-MCNC: 73 MG/DL
HGB BLD-MCNC: 14 G/DL (ref 11.5–15.5)
HGB UR QL STRIP: NEGATIVE
IMM GRANULOCYTES # BLD: 0.02 E9/L
IMM GRANULOCYTES NFR BLD: 0.3 % (ref 0–5)
KETONES UR STRIP-MCNC: NEGATIVE MG/DL
LDLC SERPL CALC-MCNC: 93 MG/DL (ref 0–99)
LEUKOCYTE ESTERASE UR QL STRIP: ABNORMAL
LYMPHOCYTES # BLD: 1.82 E9/L (ref 1.5–4)
LYMPHOCYTES NFR BLD: 28 % (ref 20–42)
MCH RBC QN AUTO: 30.1 PG (ref 26–35)
MCHC RBC AUTO-ENTMCNC: 32 % (ref 32–34.5)
MCV RBC AUTO: 94.2 FL (ref 80–99.9)
MONOCYTES # BLD: 0.51 E9/L (ref 0.1–0.95)
MONOCYTES NFR BLD: 7.8 % (ref 2–12)
NEUTROPHILS # BLD: 3.91 E9/L (ref 1.8–7.3)
NEUTS SEG NFR BLD: 60.2 % (ref 43–80)
NITRITE UR QL STRIP: NEGATIVE
PH UR STRIP: 7 [PH] (ref 5–9)
PLATELET # BLD AUTO: 246 E9/L (ref 130–450)
PMV BLD AUTO: 10.1 FL (ref 7–12)
POTASSIUM SERPL-SCNC: 4.4 MMOL/L (ref 3.5–5)
PROT SERPL-MCNC: 6.9 G/DL (ref 6.4–8.3)
PROT UR STRIP-MCNC: NEGATIVE MG/DL
RBC # BLD AUTO: 4.65 E12/L (ref 3.5–5.5)
RBC #/AREA URNS HPF: ABNORMAL /HPF (ref 0–2)
SODIUM SERPL-SCNC: 142 MMOL/L (ref 132–146)
SP GR UR STRIP: 1.01 (ref 1–1.03)
TRIGL SERPL-MCNC: 82 MG/DL (ref 0–149)
TSH SERPL-MCNC: 0.61 UIU/ML (ref 0.27–4.2)
UROBILINOGEN UR STRIP-ACNC: 0.2 E.U./DL
VLDLC SERPL CALC-MCNC: 16 MG/DL
WBC # BLD: 6.5 E9/L (ref 4.5–11.5)
WBC #/AREA URNS HPF: ABNORMAL /HPF (ref 0–5)

## 2023-03-29 PROCEDURE — 36415 COLL VENOUS BLD VENIPUNCTURE: CPT

## 2023-03-29 PROCEDURE — 84443 ASSAY THYROID STIM HORMONE: CPT

## 2023-03-29 PROCEDURE — 85025 COMPLETE CBC W/AUTO DIFF WBC: CPT

## 2023-03-29 PROCEDURE — 81001 URINALYSIS AUTO W/SCOPE: CPT

## 2023-03-29 PROCEDURE — 80053 COMPREHEN METABOLIC PANEL: CPT

## 2023-03-29 PROCEDURE — 87088 URINE BACTERIA CULTURE: CPT

## 2023-03-29 PROCEDURE — 80061 LIPID PANEL: CPT

## 2023-03-31 LAB — BACTERIA UR CULT: NORMAL

## 2023-04-04 ENCOUNTER — TELEPHONE (OUTPATIENT)
Dept: PAIN MANAGEMENT | Age: 83
End: 2023-04-04

## 2023-04-04 NOTE — TELEPHONE ENCOUNTER
Call to Jose Pérez that procedure was approved for 4/11/2023 and that [facility] should call her a few days before for the pre op call and after 3:00 PM the business day before with the arrival time. Instructed Kathdaysi Jalen to hold ibuprofen for 24 hours, naprosyn for 4 days and any aspirin containing products or fish oil for 7 days, she states that she quit taking her ASA Sunday 4/2/23. Instructed to call office back if any questions. Rohit Rao verbalized understanding.     Electronically signed by Lucio Crenshaw RN on 4/4/2023 at 11:49 AM

## 2023-04-17 ENCOUNTER — HOSPITAL ENCOUNTER (OUTPATIENT)
Dept: NUCLEAR MEDICINE | Age: 83
Discharge: HOME | End: 2023-04-17
Payer: MEDICARE

## 2023-04-17 DIAGNOSIS — R93.0 NONSPECIFIC ABNORMAL FINDINGS ON IMAGING EXAMINATION OF SKULL AND HEAD: ICD-10-CM

## 2023-04-17 DIAGNOSIS — C79.51 MALIGNANT NEOPLASM METASTATIC TO BONE (HCC): ICD-10-CM

## 2023-04-17 PROCEDURE — 78815 PET IMAGE W/CT SKULL-THIGH: CPT

## 2023-04-17 PROCEDURE — A9552 F18 FDG: HCPCS | Performed by: RADIOLOGY

## 2023-04-17 PROCEDURE — 3430000000 HC RX DIAGNOSTIC RADIOPHARMACEUTICAL: Performed by: RADIOLOGY

## 2023-04-17 RX ORDER — FLUDEOXYGLUCOSE F 18 200 MCI/ML
15 INJECTION, SOLUTION INTRAVENOUS
Status: COMPLETED | OUTPATIENT
Start: 2023-04-17 | End: 2023-04-17

## 2023-04-17 RX ADMIN — FLUDEOXYGLUCOSE F 18 15 MILLICURIE: 200 INJECTION, SOLUTION INTRAVENOUS at 20:19

## 2023-05-02 ENCOUNTER — OFFICE VISIT (OUTPATIENT)
Dept: PAIN MANAGEMENT | Age: 83
End: 2023-05-02
Payer: MEDICARE

## 2023-05-02 ENCOUNTER — PREP FOR PROCEDURE (OUTPATIENT)
Dept: PAIN MANAGEMENT | Age: 83
End: 2023-05-02

## 2023-05-02 VITALS
TEMPERATURE: 96.9 F | BODY MASS INDEX: 23.64 KG/M2 | OXYGEN SATURATION: 96 % | WEIGHT: 156 LBS | HEIGHT: 68 IN | HEART RATE: 88 BPM | SYSTOLIC BLOOD PRESSURE: 120 MMHG | DIASTOLIC BLOOD PRESSURE: 88 MMHG | RESPIRATION RATE: 16 BRPM

## 2023-05-02 DIAGNOSIS — M48.061 SPINAL STENOSIS OF LUMBAR REGION, UNSPECIFIED WHETHER NEUROGENIC CLAUDICATION PRESENT: ICD-10-CM

## 2023-05-02 DIAGNOSIS — M51.36 DDD (DEGENERATIVE DISC DISEASE), LUMBAR: Primary | ICD-10-CM

## 2023-05-02 DIAGNOSIS — M47.817 LUMBOSACRAL SPONDYLOSIS WITHOUT MYELOPATHY: ICD-10-CM

## 2023-05-02 DIAGNOSIS — M48.061 SPINAL STENOSIS OF LUMBAR REGION, UNSPECIFIED WHETHER NEUROGENIC CLAUDICATION PRESENT: Primary | ICD-10-CM

## 2023-05-02 DIAGNOSIS — M51.36 DDD (DEGENERATIVE DISC DISEASE), LUMBAR: ICD-10-CM

## 2023-05-02 PROCEDURE — 3079F DIAST BP 80-89 MM HG: CPT | Performed by: ANESTHESIOLOGY

## 2023-05-02 PROCEDURE — G8399 PT W/DXA RESULTS DOCUMENT: HCPCS | Performed by: ANESTHESIOLOGY

## 2023-05-02 PROCEDURE — G8420 CALC BMI NORM PARAMETERS: HCPCS | Performed by: ANESTHESIOLOGY

## 2023-05-02 PROCEDURE — 3074F SYST BP LT 130 MM HG: CPT | Performed by: ANESTHESIOLOGY

## 2023-05-02 PROCEDURE — 99213 OFFICE O/P EST LOW 20 MIN: CPT | Performed by: ANESTHESIOLOGY

## 2023-05-02 PROCEDURE — G8427 DOCREV CUR MEDS BY ELIG CLIN: HCPCS | Performed by: ANESTHESIOLOGY

## 2023-05-02 PROCEDURE — 1036F TOBACCO NON-USER: CPT | Performed by: ANESTHESIOLOGY

## 2023-05-02 PROCEDURE — 1090F PRES/ABSN URINE INCON ASSESS: CPT | Performed by: ANESTHESIOLOGY

## 2023-05-02 PROCEDURE — 99214 OFFICE O/P EST MOD 30 MIN: CPT | Performed by: ANESTHESIOLOGY

## 2023-05-02 PROCEDURE — 1123F ACP DISCUSS/DSCN MKR DOCD: CPT | Performed by: ANESTHESIOLOGY

## 2023-05-02 RX ORDER — SODIUM CHLORIDE 0.9 % (FLUSH) 0.9 %
5-40 SYRINGE (ML) INJECTION PRN
Status: CANCELLED | OUTPATIENT
Start: 2023-05-02

## 2023-05-02 RX ORDER — SODIUM CHLORIDE 0.9 % (FLUSH) 0.9 %
5-40 SYRINGE (ML) INJECTION EVERY 12 HOURS SCHEDULED
Status: CANCELLED | OUTPATIENT
Start: 2023-05-02

## 2023-05-02 RX ORDER — SODIUM CHLORIDE 9 MG/ML
INJECTION, SOLUTION INTRAVENOUS PRN
Status: CANCELLED | OUTPATIENT
Start: 2023-05-02

## 2023-05-02 NOTE — PROGRESS NOTES
Tayo Osei presents to the [facility] on 5/2/2023. Vincent Sawant is complaining of pain lumbar . The pain is intermittent. The pain is described as aching. Pain is rated on her best day at a 4, on her worst day at a 6, and on average at a 5 on the VAS scale. She took her last dose of  nothin g . Any procedures since your last visit: Yes, with 40 % relief. Pacemaker or defibrillator: No    She is not on NSAIDS and is not on anticoagulation medications   Medication Contract and Consent for Opioid Use Documents Filed       Patient Documents       Type of Document Status Date Received Received By Description    Medication Contract Received 3/12/2019  9:37 AM Enio DAMICO pain management patient agreement 03/12/2019                    Temp 96.9 °F (36.1 °C) (Temporal)   Resp 16   Ht 5' 8\" (1.727 m)   Wt 156 lb (70.8 kg)   BMI 23.72 kg/m²      No LMP recorded. Patient has had a hysterectomy.

## 2023-05-02 NOTE — PROGRESS NOTES
[address]  [address] [facility], [address] [place] [address] [place]  [phone]    Follow up Note      Abhi Arvizu     Date of Visit:  5/2/2023    CC:  Patient presents for follow up   Chief Complaint   Patient presents with    Follow-up     LUMBAR TRANSFORAMINAL EPIDURAL STEROID INJECTION RIGHT L2 AND LEFT L3 UNDER FLUOROSCOPIC GUIDANCE       HPI:  79 y.o. pleasant lady with h/o Low back pain. Failed conservative RX - meds and PT. H/o Left L4-5 Synovial cyst - extradural. Has been evaluated by Dr. Leni Maki. Had undergone epidural injection in 2019 with excellent pain relief. She apparently had L1 & L2 vertebral compression fracture and subsequently treated by Dr. Michelle Lewis and has undergone Kyphoplasty. Anticoagulation medications -ASA 81 mg. The patient  has not been on herbal supplements. The patient is not diabetic. Previous treatments:    Physical therapy / HEP. Ongoing HEP noted    Medications    Interventions- had helped     Imaging studies:  PET CT: 3/30/2023:  IMPRESSION:  No metabolically active metastatic disease. NM bone scan: 1/19/2023:  Impression             Nuclear bone scan:      1. Focal area of abnormal uptake in the right skull. 2. Scattered abnormal uptake in the L1, L2, L5 vertebral bodies. 3. Distension of the right renal collecting system. MRI of LS spine 9/2022:( detailed report scanned in media section)        MRI of LS spine: 6/16/2022: (Apparently had Kyphoplasty since this MRI)  Impression   1. Findings consistent with acute/subacute compression fracture of the   superior endplate of L2 with about 35% loss of height. 2. Moderate central canal stenosis at L1-L2.   3. Mild central canal stenosis and bilateral subarticular recess stenosis at   L4-L5. The appearance of this level is markedly improved with apparent   resolution of the large synovial cyst seen at L4-L5 on the prior study.            Xray left

## 2023-05-30 ENCOUNTER — TELEPHONE (OUTPATIENT)
Dept: PAIN MANAGEMENT | Age: 83
End: 2023-05-30

## 2023-05-30 NOTE — TELEPHONE ENCOUNTER
Call to Lillie Harm that procedure was approved for 6/6/2023 and that [facility] should call her a few days before for the pre op call and after 3:00 PM the business day before with the arrival time. Instructed Ivory to hold ibuprofen for 24 hours, naprosyn for 4 days and any aspirin containing products or fish oil for 7 days. Instructed to call office back if any questions. Ramez Tello verbalized understanding.     Electronically signed by Jay Jay Chacon RN on 5/30/2023 at 2:30 PM

## 2023-06-06 ENCOUNTER — HOSPITAL ENCOUNTER (OUTPATIENT)
Age: 83
Setting detail: OUTPATIENT SURGERY
Discharge: HOME | End: 2023-06-06
Attending: ANESTHESIOLOGY | Admitting: ANESTHESIOLOGY
Payer: MEDICARE

## 2023-06-06 ENCOUNTER — HOSPITAL ENCOUNTER (OUTPATIENT)
Dept: OPERATING ROOM | Age: 83
Setting detail: OUTPATIENT SURGERY
Discharge: HOME | End: 2023-06-06
Attending: ANESTHESIOLOGY
Payer: MEDICARE

## 2023-06-06 VITALS
TEMPERATURE: 98 F | HEIGHT: 68 IN | SYSTOLIC BLOOD PRESSURE: 141 MMHG | WEIGHT: 156 LBS | BODY MASS INDEX: 23.64 KG/M2 | HEART RATE: 77 BPM | RESPIRATION RATE: 14 BRPM | OXYGEN SATURATION: 97 % | DIASTOLIC BLOOD PRESSURE: 67 MMHG

## 2023-06-06 DIAGNOSIS — M54.16 LUMBAR RADICULOPATHY: ICD-10-CM

## 2023-06-06 PROCEDURE — 2709999900 HC NON-CHARGEABLE SUPPLY: Performed by: ANESTHESIOLOGY

## 2023-06-06 PROCEDURE — 6360000002 HC RX W HCPCS: Performed by: ANESTHESIOLOGY

## 2023-06-06 PROCEDURE — 6360000004 HC RX CONTRAST MEDICATION: Performed by: ANESTHESIOLOGY

## 2023-06-06 PROCEDURE — 3600000005 HC SURGERY LEVEL 5 BASE: Performed by: ANESTHESIOLOGY

## 2023-06-06 PROCEDURE — 7100000010 HC PHASE II RECOVERY - FIRST 15 MIN: Performed by: ANESTHESIOLOGY

## 2023-06-06 PROCEDURE — 7100000011 HC PHASE II RECOVERY - ADDTL 15 MIN: Performed by: ANESTHESIOLOGY

## 2023-06-06 PROCEDURE — 2500000003 HC RX 250 WO HCPCS: Performed by: ANESTHESIOLOGY

## 2023-06-06 PROCEDURE — 3209999900 FLUORO FOR SURGICAL PROCEDURES

## 2023-06-06 RX ORDER — SODIUM CHLORIDE 0.9 % (FLUSH) 0.9 %
5-40 SYRINGE (ML) INJECTION EVERY 12 HOURS SCHEDULED
Status: DISCONTINUED | OUTPATIENT
Start: 2023-06-06 | End: 2023-06-06 | Stop reason: HOSPADM

## 2023-06-06 RX ORDER — SODIUM CHLORIDE 0.9 % (FLUSH) 0.9 %
5-40 SYRINGE (ML) INJECTION PRN
Status: DISCONTINUED | OUTPATIENT
Start: 2023-06-06 | End: 2023-06-06 | Stop reason: HOSPADM

## 2023-06-06 RX ORDER — SODIUM CHLORIDE 9 MG/ML
INJECTION, SOLUTION INTRAVENOUS PRN
Status: DISCONTINUED | OUTPATIENT
Start: 2023-06-06 | End: 2023-06-06 | Stop reason: HOSPADM

## 2023-06-06 RX ORDER — METHYLPREDNISOLONE ACETATE 40 MG/ML
INJECTION, SUSPENSION INTRA-ARTICULAR; INTRALESIONAL; INTRAMUSCULAR; SOFT TISSUE PRN
Status: DISCONTINUED | OUTPATIENT
Start: 2023-06-06 | End: 2023-06-06 | Stop reason: ALTCHOICE

## 2023-06-06 RX ORDER — LIDOCAINE HYDROCHLORIDE 5 MG/ML
INJECTION, SOLUTION INFILTRATION; INTRAVENOUS PRN
Status: DISCONTINUED | OUTPATIENT
Start: 2023-06-06 | End: 2023-06-06 | Stop reason: ALTCHOICE

## 2023-06-06 ASSESSMENT — PAIN - FUNCTIONAL ASSESSMENT: PAIN_FUNCTIONAL_ASSESSMENT: 0-10

## 2023-06-06 ASSESSMENT — PAIN DESCRIPTION - DESCRIPTORS: DESCRIPTORS: ACHING

## 2023-06-06 NOTE — OP NOTE
Operative Note      Patient: Eve Gallardo  YOB: 1940  MRN: [id number]    Date of Procedure: 2023    Pre-Op Diagnosis Codes:     * Lumbar radiculopathy [M54.16]    Post-Op Diagnosis: Same       Procedure(s):  LUMBAR EPIDURAL STEROID INJECTION UNDER FLUOROSCOPIC GUIDANCE AT L4-L5    Surgeon(s):  Bryce Padilla MD    Assistant:   * No surgical staff found *    Anesthesia: Local    Estimated Blood Loss (mL): Minimal    Complications: None    Specimens:   * No specimens in log *    Implants:  * No implants in log *      Drains: * No LDAs found *    Findings: good needle placement      Detailed Description of Procedure:   2023    Patient: Eve Gallardo  :  1940  Age:  79 y.o. Sex:  female     PRE-OPERATIVE DIAGNOSIS: Lumbar disc displacement, lumbar radiculopathy. POST-OPERATIVE DIAGNOSIS: Same. PROCEDURE: Fluoroscopic guided therapeutic lumbar epidural steroid injection at the L4-5 level . SURGEON: Bryce Padilla MD    ANESTHESIA: Local    ESTIMATED BLOOD LOSS: None.  ______________________________________________________________________    BRIEF HISTORY:  Eve Gallardo comes in today for  lumbar epidural injection at L4-5 level. The potential complications of this procedure were discussed with her again today. She has elected to undergo the aforementioned procedure. Anival complete History & Physical examination were reviewed in depth, a copy of which is in the chart. DESCRIPTION OF PROCEDURE:    After confirming written and informed consent, a time-out was performed and Anival name and date of birth, the procedure to be performed as well as the plan for the location of the needle insertion were confirmed. The patient was brought into the procedure room and placed in the prone position on the fluoroscopy table. A pillow was placed under the patient's lower abdomen/upper pelvis to increase lumbar interlaminar space.  Standard monitors were

## 2023-06-06 NOTE — H&P
cyanosis. MUSCULOSKELETAL: negative for flaccid muscle tone or spastic movements. SKIN: gross examination reveals no signs of rashes, or diaphoresis. NEURO: Cranial nerves II-XII grossly intact. No signs of agitated mood. Assessment/Plan:    Patient  is here for LESI for low back/ LE pain.     Yaima Hernandes MD

## 2023-06-06 NOTE — DISCHARGE INSTRUCTIONS
or seek immediate medical care if:    You have new or worse nausea or vomiting. You are too sick to your stomach to drink any fluids. You cannot keep down fluids. You have symptoms of dehydration, such as:  Dry eyes and a dry mouth. Passing only a little urine. Feeling thirstier than usual.     Your pain medicine is not helping. You are dizzy or lightheaded, or you feel like you may faint. Watch closely for changes in your health, and be sure to contact your doctor if:    You do not get better as expected. Current as of: June 6, 2022               Content Version: 13.6  © 2006-2023 [employer]wise, [employer]. Care instructions adapted under license by [facility] ([facility]). If you have questions about a medical condition or this instruction, always ask your healthcare professional. [address] any warranty or liability for your use of this information. Infection After Surgery: Care Instructions  Overview  After surgery, an infection is always possible. It doesn't mean that the surgery didn't go well. Because an infection can be serious, your doctor has taken steps to manage it. Your doctor checked the infection and cleaned it if necessary. Your doctor may have made an opening in the area so that the pus can drain out. You may have gauze in the cut so that the area will stay open and keep draining. You may need antibiotics. You will need to follow up with your doctor to make sure the infection has gone away. Follow-up care is a key part of your treatment and safety. Be sure to make and go to all appointments, and call your doctor if you are having problems. It's also a good idea to know your test results and keep a list of the medicines you take. How can you care for yourself at home? Make sure your surgeon knows about the infection, especially if you saw another doctor about your symptoms. If your doctor prescribed antibiotics, take them as directed.  Do not stop

## 2023-06-13 NOTE — ANESTHESIA POSTPROCEDURE EVALUATION
Next appt 07/20/2023  Last appt 02/21/2023    Refill request for   Disp Refills Start End    omeprazole (PriLOSEC) 40 MG capsule 90 capsule 3 6/9/2022     Sig - Route: Take 1 capsule by mouth daily. - Oral    Refilled per standing med protocol.     Disp Refills Start End    olopatadine (Pataday) 0.1 % ophthalmic solution 5 mL 12 6/9/2022     Sig - Route: Place 1 drop into both eyes 2 times daily. - Both Eyes    NON PROTOCOL MEDICATION        Routed to provider's pool to review, thanks.          Department of Anesthesiology  Postprocedure Note    Patient: Suleiman Carey  MRN: [id number]  YOB: 1940  Date of evaluation: 3/31/2022  Time:  3:36 PM     Procedure Summary     Date: 03/31/22 Room / Location: [address] [place] [address] 06 / [facility]    Anesthesia Start: 1000 Anesthesia Stop: 3836    Procedure: REVISION LEFT TOTAL HIP ([address]) (Left Hip) Diagnosis: (S\P CLOSED REDUCTION OF DISLOCATED TOTAL HIP PROSTHESIS-LEFT)    Surgeons: Rina Rueda DO Responsible Provider: Shannan Schulte MD    Anesthesia Type: general ASA Status: 3          Anesthesia Type: general    Alpa Phase I: Alpa Score: 10    Alpa Phase II:      Last vitals: Reviewed and per EMR flowsheets.        Anesthesia Post Evaluation    Patient location during evaluation: PACU  Patient participation: complete - patient participated  Level of consciousness: awake  Airway patency: patent  Nausea & Vomiting: no nausea and no vomiting  Complications: no  Cardiovascular status: hemodynamically stable  Respiratory status: acceptable  Hydration status: euvolemic

## 2023-06-23 ENCOUNTER — HOSPITAL ENCOUNTER (OUTPATIENT)
Dept: ULTRASOUND IMAGING | Age: 83
Discharge: HOME | End: 2023-06-23
Attending: UROLOGY
Payer: MEDICARE

## 2023-06-23 DIAGNOSIS — N39.0 URINARY TRACT INFECTION WITHOUT HEMATURIA, SITE UNSPECIFIED: ICD-10-CM

## 2023-06-23 PROCEDURE — 76770 US EXAM ABDO BACK WALL COMP: CPT

## 2023-10-13 ENCOUNTER — HOSPITAL ENCOUNTER (OUTPATIENT)
Age: 83
Discharge: HOME | End: 2023-10-13
Payer: MEDICARE

## 2023-10-13 LAB
25(OH)D3 SERPL-MCNC: 66.4 NG/ML (ref 30–100)
ALBUMIN SERPL-MCNC: 4.3 G/DL (ref 3.5–5.2)
ALP SERPL-CCNC: 58 U/L (ref 35–104)
ALT SERPL-CCNC: 14 U/L (ref 0–32)
ANION GAP SERPL CALCULATED.3IONS-SCNC: 10 MMOL/L (ref 7–16)
AST SERPL-CCNC: 17 U/L (ref 0–31)
BASOPHILS # BLD: 0.05 K/UL (ref 0–0.2)
BASOPHILS NFR BLD: 1 % (ref 0–2)
BILIRUB SERPL-MCNC: 0.4 MG/DL (ref 0–1.2)
BUN SERPL-MCNC: 27 MG/DL (ref 6–23)
CALCIUM SERPL-MCNC: 9.6 MG/DL (ref 8.6–10.2)
CHLORIDE SERPL-SCNC: 102 MMOL/L (ref 98–107)
CHOLEST SERPL-MCNC: 146 MG/DL
CO2 SERPL-SCNC: 29 MMOL/L (ref 22–29)
CREAT SERPL-MCNC: 0.8 MG/DL (ref 0.5–1)
EOSINOPHIL # BLD: 0.23 K/UL (ref 0.05–0.5)
EOSINOPHILS RELATIVE PERCENT: 3 % (ref 0–6)
ERYTHROCYTE [DISTWIDTH] IN BLOOD BY AUTOMATED COUNT: 12.8 % (ref 11.5–15)
GFR SERPL CREATININE-BSD FRML MDRD: >60 ML/MIN/1.73M2
GLUCOSE SERPL-MCNC: 109 MG/DL (ref 74–99)
HCT VFR BLD AUTO: 45.2 % (ref 34–48)
HDLC SERPL-MCNC: 62 MG/DL
HGB BLD-MCNC: 14.2 G/DL (ref 11.5–15.5)
IMM GRANULOCYTES # BLD AUTO: 0.03 K/UL (ref 0–0.58)
IMM GRANULOCYTES NFR BLD: 0 % (ref 0–5)
LDLC SERPL CALC-MCNC: 62 MG/DL
LYMPHOCYTES NFR BLD: 2.03 K/UL (ref 1.5–4)
LYMPHOCYTES RELATIVE PERCENT: 26 % (ref 20–42)
MCH RBC QN AUTO: 29.8 PG (ref 26–35)
MCHC RBC AUTO-ENTMCNC: 31.4 G/DL (ref 32–34.5)
MCV RBC AUTO: 94.8 FL (ref 80–99.9)
MONOCYTES NFR BLD: 0.4 K/UL (ref 0.1–0.95)
MONOCYTES NFR BLD: 5 % (ref 2–12)
NEUTROPHILS NFR BLD: 65 % (ref 43–80)
NEUTS SEG NFR BLD: 4.99 K/UL (ref 1.8–7.3)
PLATELET # BLD AUTO: 269 K/UL (ref 130–450)
PMV BLD AUTO: 10.5 FL (ref 7–12)
POTASSIUM SERPL-SCNC: 4.5 MMOL/L (ref 3.5–5)
PROT SERPL-MCNC: 7.3 G/DL (ref 6.4–8.3)
RBC # BLD AUTO: 4.77 M/UL (ref 3.5–5.5)
SODIUM SERPL-SCNC: 141 MMOL/L (ref 132–146)
TRIGL SERPL-MCNC: 110 MG/DL
TSH SERPL DL<=0.05 MIU/L-ACNC: 0.81 UIU/ML (ref 0.27–4.2)
VLDLC SERPL CALC-MCNC: 22 MG/DL
WBC OTHER # BLD: 7.7 K/UL (ref 4.5–11.5)

## 2023-10-13 PROCEDURE — 85025 COMPLETE CBC W/AUTO DIFF WBC: CPT

## 2023-10-13 PROCEDURE — 82306 VITAMIN D 25 HYDROXY: CPT

## 2023-10-13 PROCEDURE — 80053 COMPREHEN METABOLIC PANEL: CPT

## 2023-10-13 PROCEDURE — 84443 ASSAY THYROID STIM HORMONE: CPT

## 2023-10-13 PROCEDURE — 80061 LIPID PANEL: CPT

## 2023-10-13 PROCEDURE — 36415 COLL VENOUS BLD VENIPUNCTURE: CPT

## 2023-11-14 NOTE — ANESTHESIA PRE PROCEDURE
Department of Anesthesiology  Preprocedure Note       Name:  Rian Mauro   Age:  79 y.o.  :  1940                                          MRN:  [id number]         Date:  2022      Surgeon: Chava Kan):  Lou Wallace MD    Procedure: Procedure(s):  LAPAROSCOPIC POSSIBLE OPEN HERNIA HIATAL REPAIR WITH MESH    Medications prior to admission:   Prior to Admission medications    Medication Sig Start Date End Date Taking? Authorizing Provider   amLODIPine-benazepril (LOTREL) 2.5-10 MG per capsule Take 1 capsule by mouth daily 22   Fernanda Ortega, DO   pantoprazole (PROTONIX) 40 MG tablet TAKE 1 TABLET BY MOUTH EVERY MORNING 8/3/22   Historical Provider, MD   aspirin 325 MG EC tablet Take 1 tablet by mouth 2 times daily 3/31/22 3/31/23  Regina Robertsonller,    atorvastatin (LIPITOR) 40 MG tablet Take 40 mg by mouth at bedtime    Historical Provider, MD   vitamin D (CHOLECALCIFEROL) 1000 UNITS TABS tablet Take 2,000 Units by mouth daily    Historical Provider, MD   Red Yeast Rice 600 MG CAPS Take 2 capsules by mouth three times a week    Historical Provider, MD   Multiple Vitamins-Minerals (CENTRUM SILVER ADULT 50+ PO) Take  by mouth daily. Historical Provider, MD   calcium-vitamin D (OSCAL-500) 500-200 MG-UNIT per tablet Take 1 tablet by mouth 2 times daily. Historical Provider, MD   NONFORMULARY daily.  Tunde Steven    Historical Provider, MD       Current medications:    Current Facility-Administered Medications   Medication Dose Route Frequency Provider Last Rate Last Admin    lactated ringers infusion   IntraVENous Continuous Lou Wallace MD 75 mL/hr at 22 0839 New Bag at 22 0839    sodium chloride flush 0.9 % injection 5-40 mL  5-40 mL IntraVENous 2 times per day Lou Wallace MD        sodium chloride flush 0.9 % injection 5-40 mL  5-40 mL IntraVENous PRN Lou Wallace MD        0.9 % sodium chloride infusion   IntraVENous PRN Lou Wallace MD        ceFAZolin (ANCEF) 2,000 mg in sterile water 20 mL IV syringe  2,000 mg IntraVENous On Call to [address] Ash[address]           Allergies:  No Known Allergies    Problem List:    Patient Active Problem List   Diagnosis Code    Degenerative arthritis of hip R M16.9    Primary osteoarthritis of left knee M17.12    Essential hypertension I10    Familial hypercholesterolemia E78.01    Osteoporosis M81.0    Spinal stenosis of lumbar region M48.061    Lumbar radiculopathy M54.16    DDD (degenerative disc disease), lumbar M51.36    Synovial cyst of lumbar facet joint M71.38    Arthritis of right knee M17.11    DJD (degenerative joint disease) M19.90       Past Medical History:        Diagnosis Date    Arthritis     right hip    Cancer ([facility] [address]     breast 1997 approx. left    Hyperlipidemia     Hypertension     Osteoarthrosis     Osteoporosis     Other chronic postoperative pain     Undiagnosed cardiac murmurs        Past Surgical History:        Procedure Laterality Date    ANESTHESIA NERVE BLOCK Left 4/30/2019    LUMBAR EPIDURAL STERIOD INJECTION L4-5  (LEFT PARAMEDIAN APPROACH) performed by Purnima Narayan MD at [address] [place] [address]      biopsied on left breast    COLONOSCOPY      EPIDURAL STEROID INJECTION Left 3/19/2019    LEFT LUMBAR TRANSFORAMINAL EPIDURAL STEROID INJECTION: L4 / L5 performed by Purnima Narayan MD at [address]      right ankle    HYSTERECTOMY (CERVIX STATUS UNKNOWN)  1979    JOINT REPLACEMENT      L hip     JOINT REPLACEMENT  10/2013    R hip    JOINT REPLACEMENT  03/08/2016    L knee     JOINT REPLACEMENT  11/03/2020    R knee     NERVE BLOCK Left 03/19/2019    left lumbar transforaminal epidural    NERVE BLOCK Left 04/30/2019    lumbar    REVISION TOTAL HIP ARTHROPLASTY Left 3/31/2022    REVISION LEFT TOTAL HIP (DEPUY) performed by Abad Lew DO at [address] [facility]  TOTAL HIP ARTHROPLASTY Right Oct 2013    with cell saver    TOTAL KNEE ARTHROPLASTY Left 3/8/16    TOTAL KNEE ARTHROPLASTY Right 11/3/2020    TOTAL RIGHT KNEE REPLACEMENT/ ARTHROPLASTY (BERNABE) performed by Willie Davila DO at [address] History:    Social History     Tobacco Use    Smoking status: Former     Packs/day: 0.25     Years: 3.00     Pack years: 0.75     Types: Cigarettes     Quit date: 9/10/1998     Years since quittin.0    Smokeless tobacco: Never    Tobacco comments:     quit smoking approx 20 yrs ago   Substance Use Topics    Alcohol use: Yes     Alcohol/week: 1.0 standard drink     Types: 1 Glasses of wine per week     Comment: occasionally                                Counseling given: Not Answered  Tobacco comments: quit smoking approx 20 yrs ago      Vital Signs (Current):   Vitals:    22   BP: 132/60   Pulse: 100   Resp: 18   Temp: 98 °F (36.7 °C)   TempSrc: Infrared   SpO2: 95%   Weight: 155 lb (70.3 kg)   Height: 5' 8\" (1.727 m)                                              BP Readings from Last 3 Encounters:   22 132/60   22 120/72   22 136/76       NPO Status: Time of last liquid consumption: 1700                        Time of last solid consumption: 1700                        Date of last liquid consumption: 22                        Date of last solid food consumption: 22    BMI:   Wt Readings from Last 3 Encounters:   22 155 lb (70.3 kg)   22 155 lb (70.3 kg)   22 152 lb 12.8 oz (69.3 kg)     Body mass index is 23.57 kg/m².     CBC:   Lab Results   Component Value Date/Time    WBC 7.1 2022 08:58 AM    RBC 4.10 2022 08:58 AM    HGB 12.5 2022 08:58 AM    HCT 40.6 2022 08:58 AM    MCV 99.0 2022 08:58 AM    RDW 14.3 2022 08:58 AM     2022 08:58 AM       CMP:   Lab Results   Component Value Date/Time     2022 08:58 AM    K 4.7 2022 08:58 AM     09/08/2022 08:58 AM    CO2 28 09/08/2022 08:58 AM    BUN 21 09/08/2022 08:58 AM    CREATININE 0.8 09/08/2022 08:58 AM    GFRAA >60 09/08/2022 08:58 AM    LABGLOM >60 09/08/2022 08:58 AM    GLUCOSE 101 09/08/2022 08:58 AM    GLUCOSE 87 01/30/2012 08:50 AM    PROT 7.5 08/01/2022 01:40 PM    CALCIUM 9.7 09/08/2022 08:58 AM    BILITOT 0.6 08/01/2022 01:40 PM    ALKPHOS 103 08/01/2022 01:40 PM    AST 17 08/01/2022 01:40 PM    ALT 17 08/01/2022 01:40 PM       POC Tests: No results for input(s): POCGLU, POCNA, POCK, POCCL, POCBUN, POCHEMO, POCHCT in the last 72 hours.     Coags:   Lab Results   Component Value Date/Time    PROTIME 12.0 03/22/2022 09:44 AM    INR 1.0 03/22/2022 09:44 AM    APTT 29.6 03/22/2022 09:44 AM       HCG (If Applicable): No results found for: PREGTESTUR, PREGSERUM, HCG, HCGQUANT     ABGs: No results found for: PHART, PO2ART, JTM4STQ, EUD1KHF, BEART, C1FAHNAS     Type & Screen (If Applicable):  No results found for: LABABO, LABRH    Drug/Infectious Status (If Applicable):  No results found for: HIV, HEPCAB    COVID-19 Screening (If Applicable):   Lab Results   Component Value Date/Time    COVID19 Not Detected 10/28/2020 09:27 AM         Anesthesia Evaluation  Patient summary reviewed no history of anesthetic complications:   Airway: Mallampati: II  TM distance: >3 FB   Neck ROM: full  Mouth opening: > = 3 FB   Dental: normal exam         Pulmonary: breath sounds clear to auscultation      (-) COPD and shortness of breath                          ROS comment: Former smoker   Cardiovascular:  Exercise tolerance: good (>4 METS),   (+) hypertension: moderate, hyperlipidemia    (-) past MI and CAD    ECG reviewed  Rhythm: regular  Rate: normal                    Neuro/Psych:   (+) neuromuscular disease:,             GI/Hepatic/Renal:   (+) hiatal hernia, GERD:,      (-) liver disease and no renal disease       Endo/Other:    (+) : arthritis: OA., .    (-) diabetes mellitus, hypothyroidism        Pt had PAT visit. Abdominal:             Vascular: negative vascular ROS. Other Findings:             Anesthesia Plan      general     ASA 3       Induction: intravenous. MIPS: Postoperative opioids intended and Prophylactic antiemetics administered. Anesthetic plan and risks discussed with patient. Plan discussed with CRNA.               Gamaliel Hopson DO  Staff Anesthesiologist  9:17 AM 24.5

## 2024-06-21 ENCOUNTER — HOSPITAL ENCOUNTER (OUTPATIENT)
Age: 84
Discharge: HOME | End: 2024-06-21
Payer: MEDICARE

## 2024-06-21 DIAGNOSIS — M20.42 HAMMER TOE OF LEFT FOOT: ICD-10-CM

## 2024-06-21 LAB
EKG ATRIAL RATE: 73 BPM
EKG P AXIS: 66 DEGREES
EKG P-R INTERVAL: 142 MS
EKG Q-T INTERVAL: 396 MS
EKG QRS DURATION: 82 MS
EKG QTC CALCULATION (BAZETT): 436 MS
EKG R AXIS: 10 DEGREES
EKG T AXIS: 27 DEGREES
EKG VENTRICULAR RATE: 73 BPM

## 2024-06-21 PROCEDURE — 93005 ELECTROCARDIOGRAM TRACING: CPT | Performed by: ANESTHESIOLOGY

## 2024-06-24 ENCOUNTER — ANESTHESIA EVENT (OUTPATIENT)
Dept: OPERATING ROOM | Age: 84
End: 2024-06-24
Payer: MEDICARE

## 2024-06-27 ASSESSMENT — ENCOUNTER SYMPTOMS: SHORTNESS OF BREATH: 0

## 2024-06-27 NOTE — ANESTHESIA PRE PROCEDURE
Department of Anesthesiology  Preprocedure Note       Name:  Ivory Song   Age:  84 y.o.  :  1940                                          MRN:  [id number]         Date:  2024      Surgeon: Surgeon(s):  Anselmo Rowley Jr., MARY    Procedure: Procedure(s):  HAMMER TOE CORRECTION 2ND TOE LEFT FOOT AKIN BUNIONECTOMY LEFT    Medications prior to admission:   Prior to Admission medications    Medication Sig Start Date End Date Taking? Authorizing Provider   pantoprazole (PROTONIX) 40 MG tablet Take 1 tablet by mouth daily 3/12/23   Justine Perez MD   diclofenac sodium (VOLTAREN) 1 % GEL Apply 2 g topically 4 times daily 22   Raheem Saleh MD   atorvastatin (LIPITOR) 40 MG tablet TAKE 1 TABLET BY MOUTH MONDAY, WEDNESDAY AND 22   Augustin Lozada DO   aspirin 81 MG EC tablet Take 1 tablet by mouth daily Stopped 7 days pre op  Takes prophylactic    Justine Perez MD   amLODIPine-benazepril (LOTREL) 2.5-10 MG per capsule Take 1 capsule by mouth daily 22   Augustin Lozada DO   vitamin D (CHOLECALCIFEROL) 1000 UNITS TABS tablet Take 2 tablets by mouth daily    Justine Perez MD   Red Yeast Rice 600 MG CAPS Take 2 capsules by mouth three times a week    Justine Perez MD   Multiple Vitamins-Minerals (CENTRUM SILVER ADULT 50+ PO) Take  by mouth daily.    Justine Perez MD   calcium-vitamin D (OSCAL-500) 500-200 MG-UNIT per tablet Take 1 tablet by mouth 2 times daily    Justine Perez MD   NONFORMULARY daily. Tunde Red    Provider, Historical, MD       Current medications:    No current facility-administered medications for this encounter.     Current Outpatient Medications   Medication Sig Dispense Refill    pantoprazole (PROTONIX) 40 MG tablet Take 1 tablet by mouth daily      diclofenac sodium (VOLTAREN) 1 % GEL Apply 2 g topically 4 times daily 100 g 1    atorvastatin (LIPITOR) 40 MG tablet TAKE 1 TABLET BY MOUTH MONDAY, WEDNESDAY

## 2024-06-28 ENCOUNTER — HOSPITAL ENCOUNTER (OUTPATIENT)
Age: 84
Setting detail: OUTPATIENT SURGERY
Discharge: HOME | End: 2024-06-28
Attending: PODIATRIST | Admitting: PODIATRIST
Payer: MEDICARE

## 2024-06-28 ENCOUNTER — HOSPITAL ENCOUNTER (OUTPATIENT)
Dept: OPERATING ROOM | Age: 84
Setting detail: OUTPATIENT SURGERY
Discharge: HOME | End: 2024-06-28
Attending: PODIATRIST
Payer: MEDICARE

## 2024-06-28 ENCOUNTER — ANESTHESIA (OUTPATIENT)
Dept: OPERATING ROOM | Age: 84
End: 2024-06-28
Payer: MEDICARE

## 2024-06-28 VITALS
TEMPERATURE: 98.2 F | RESPIRATION RATE: 16 BRPM | OXYGEN SATURATION: 97 % | BODY MASS INDEX: 28.99 KG/M2 | HEART RATE: 79 BPM | HEIGHT: 65 IN | DIASTOLIC BLOOD PRESSURE: 62 MMHG | SYSTOLIC BLOOD PRESSURE: 114 MMHG | WEIGHT: 174 LBS

## 2024-06-28 DIAGNOSIS — M20.42 HAMMER TOE OF LEFT FOOT: ICD-10-CM

## 2024-06-28 DIAGNOSIS — G89.18 PAIN ASSOCIATED WITH SURGICAL PROCEDURE: ICD-10-CM

## 2024-06-28 DIAGNOSIS — M20.42 HAMMER TOE OF LEFT FOOT: Primary | ICD-10-CM

## 2024-06-28 DIAGNOSIS — M20.12 ACQUIRED HALLUX VALGUS OF LEFT FOOT: ICD-10-CM

## 2024-06-28 PROCEDURE — C1713 ANCHOR/SCREW BN/BN,TIS/BN: HCPCS | Performed by: PODIATRIST

## 2024-06-28 PROCEDURE — 3600000004 HC SURGERY LEVEL 4 BASE: Performed by: PODIATRIST

## 2024-06-28 PROCEDURE — 2580000003 HC RX 258: Performed by: PODIATRIST

## 2024-06-28 PROCEDURE — 2720000010 HC SURG SUPPLY STERILE: Performed by: PODIATRIST

## 2024-06-28 PROCEDURE — 3700000001 HC ADD 15 MINUTES (ANESTHESIA): Performed by: PODIATRIST

## 2024-06-28 PROCEDURE — 7100000011 HC PHASE II RECOVERY - ADDTL 15 MIN: Performed by: PODIATRIST

## 2024-06-28 PROCEDURE — 2500000003 HC RX 250 WO HCPCS: Performed by: PODIATRIST

## 2024-06-28 PROCEDURE — 7100000010 HC PHASE II RECOVERY - FIRST 15 MIN: Performed by: PODIATRIST

## 2024-06-28 PROCEDURE — 6360000002 HC RX W HCPCS: Performed by: NURSE ANESTHETIST, CERTIFIED REGISTERED

## 2024-06-28 PROCEDURE — 3600000014 HC SURGERY LEVEL 4 ADDTL 15MIN: Performed by: PODIATRIST

## 2024-06-28 PROCEDURE — 6360000002 HC RX W HCPCS: Performed by: PODIATRIST

## 2024-06-28 PROCEDURE — 2580000003 HC RX 258: Performed by: ANESTHESIOLOGY

## 2024-06-28 PROCEDURE — C1776 JOINT DEVICE (IMPLANTABLE): HCPCS | Performed by: PODIATRIST

## 2024-06-28 PROCEDURE — 3700000000 HC ANESTHESIA ATTENDED CARE: Performed by: PODIATRIST

## 2024-06-28 PROCEDURE — 2709999900 HC NON-CHARGEABLE SUPPLY: Performed by: PODIATRIST

## 2024-06-28 DEVICE — IMPLANTABLE DEVICE
Type: IMPLANTABLE DEVICE | Site: FOOT | Status: FUNCTIONAL
Brand: FUSEFORCE

## 2024-06-28 DEVICE — IMPLANTABLE DEVICE
Type: IMPLANTABLE DEVICE | Site: FOOT | Status: FUNCTIONAL
Brand: PHALINX

## 2024-06-28 RX ORDER — SODIUM CHLORIDE, SODIUM LACTATE, POTASSIUM CHLORIDE, CALCIUM CHLORIDE 600; 310; 30; 20 MG/100ML; MG/100ML; MG/100ML; MG/100ML
INJECTION, SOLUTION INTRAVENOUS CONTINUOUS
Status: DISCONTINUED | OUTPATIENT
Start: 2024-06-28 | End: 2024-06-28 | Stop reason: HOSPADM

## 2024-06-28 RX ORDER — SODIUM CHLORIDE 0.9 % (FLUSH) 0.9 %
5-40 SYRINGE (ML) INJECTION PRN
Status: DISCONTINUED | OUTPATIENT
Start: 2024-06-28 | End: 2024-06-28 | Stop reason: HOSPADM

## 2024-06-28 RX ORDER — OXYCODONE HYDROCHLORIDE AND ACETAMINOPHEN 5; 325 MG/1; MG/1
1 TABLET ORAL EVERY 4 HOURS PRN
Status: DISCONTINUED | OUTPATIENT
Start: 2024-06-28 | End: 2024-06-28 | Stop reason: HOSPADM

## 2024-06-28 RX ORDER — IBUPROFEN 200 MG
400 TABLET ORAL EVERY 6 HOURS PRN
Status: DISCONTINUED | OUTPATIENT
Start: 2024-06-28 | End: 2024-06-28 | Stop reason: HOSPADM

## 2024-06-28 RX ORDER — FENTANYL CITRATE 0.05 MG/ML
50 INJECTION, SOLUTION INTRAMUSCULAR; INTRAVENOUS EVERY 5 MIN PRN
Status: DISCONTINUED | OUTPATIENT
Start: 2024-06-28 | End: 2024-06-28 | Stop reason: HOSPADM

## 2024-06-28 RX ORDER — DROPERIDOL 2.5 MG/ML
0.62 INJECTION, SOLUTION INTRAMUSCULAR; INTRAVENOUS
Status: DISCONTINUED | OUTPATIENT
Start: 2024-06-28 | End: 2024-06-28 | Stop reason: HOSPADM

## 2024-06-28 RX ORDER — LIDOCAINE HYDROCHLORIDE 20 MG/ML
INJECTION, SOLUTION INTRAVENOUS PRN
Status: DISCONTINUED | OUTPATIENT
Start: 2024-06-28 | End: 2024-06-28 | Stop reason: SDUPTHER

## 2024-06-28 RX ORDER — FENTANYL CITRATE 50 UG/ML
INJECTION, SOLUTION INTRAMUSCULAR; INTRAVENOUS PRN
Status: DISCONTINUED | OUTPATIENT
Start: 2024-06-28 | End: 2024-06-28 | Stop reason: SDUPTHER

## 2024-06-28 RX ORDER — NALOXONE HYDROCHLORIDE 0.4 MG/ML
INJECTION, SOLUTION INTRAMUSCULAR; INTRAVENOUS; SUBCUTANEOUS PRN
Status: DISCONTINUED | OUTPATIENT
Start: 2024-06-28 | End: 2024-06-28 | Stop reason: HOSPADM

## 2024-06-28 RX ORDER — SODIUM CHLORIDE 9 MG/ML
INJECTION, SOLUTION INTRAVENOUS PRN
Status: DISCONTINUED | OUTPATIENT
Start: 2024-06-28 | End: 2024-06-28 | Stop reason: HOSPADM

## 2024-06-28 RX ORDER — CEFAZOLIN SODIUM 2 G/50ML
SOLUTION INTRAVENOUS PRN
Status: DISCONTINUED | OUTPATIENT
Start: 2024-06-28 | End: 2024-06-28 | Stop reason: SDUPTHER

## 2024-06-28 RX ORDER — MORPHINE SULFATE 2 MG/ML
1 INJECTION, SOLUTION INTRAMUSCULAR; INTRAVENOUS EVERY 5 MIN PRN
Status: DISCONTINUED | OUTPATIENT
Start: 2024-06-28 | End: 2024-06-28 | Stop reason: HOSPADM

## 2024-06-28 RX ORDER — PROPOFOL 10 MG/ML
INJECTION, EMULSION INTRAVENOUS PRN
Status: DISCONTINUED | OUTPATIENT
Start: 2024-06-28 | End: 2024-06-28 | Stop reason: SDUPTHER

## 2024-06-28 RX ORDER — SODIUM CHLORIDE 0.9 % (FLUSH) 0.9 %
5-40 SYRINGE (ML) INJECTION EVERY 12 HOURS SCHEDULED
Status: DISCONTINUED | OUTPATIENT
Start: 2024-06-28 | End: 2024-06-28 | Stop reason: HOSPADM

## 2024-06-28 RX ORDER — HYDROCODONE BITARTRATE AND ACETAMINOPHEN 5; 325 MG/1; MG/1
1 TABLET ORAL EVERY 4 HOURS PRN
Qty: 30 TABLET | Refills: 0 | Status: SHIPPED | OUTPATIENT
Start: 2024-06-28 | End: 2024-07-03

## 2024-06-28 RX ORDER — DIPHENHYDRAMINE HYDROCHLORIDE 50 MG/ML
12.5 INJECTION INTRAMUSCULAR; INTRAVENOUS
Status: DISCONTINUED | OUTPATIENT
Start: 2024-06-28 | End: 2024-06-28 | Stop reason: HOSPADM

## 2024-06-28 RX ORDER — MEPERIDINE HYDROCHLORIDE 25 MG/ML
12.5 INJECTION INTRAMUSCULAR; INTRAVENOUS; SUBCUTANEOUS ONCE
Status: DISCONTINUED | OUTPATIENT
Start: 2024-06-28 | End: 2024-06-28 | Stop reason: HOSPADM

## 2024-06-28 RX ADMIN — PROPOFOL 50 MCG/KG/MIN: 10 INJECTION, EMULSION INTRAVENOUS at 08:32

## 2024-06-28 RX ADMIN — SODIUM CHLORIDE, POTASSIUM CHLORIDE, SODIUM LACTATE AND CALCIUM CHLORIDE: 600; 310; 30; 20 INJECTION, SOLUTION INTRAVENOUS at 08:04

## 2024-06-28 RX ADMIN — LIDOCAINE HYDROCHLORIDE 50 MG: 20 INJECTION, SOLUTION INTRAVENOUS at 08:31

## 2024-06-28 RX ADMIN — LIDOCAINE HYDROCHLORIDE 50 MG: 20 INJECTION, SOLUTION INTRAVENOUS at 08:32

## 2024-06-28 RX ADMIN — PROPOFOL 80 MG: 10 INJECTION, EMULSION INTRAVENOUS at 08:31

## 2024-06-28 RX ADMIN — FENTANYL CITRATE 25 MCG: 50 INJECTION, SOLUTION INTRAMUSCULAR; INTRAVENOUS at 08:31

## 2024-06-28 RX ADMIN — WATER 2000 MG: 1 INJECTION INTRAMUSCULAR; INTRAVENOUS; SUBCUTANEOUS at 08:25

## 2024-06-28 RX ADMIN — FENTANYL CITRATE 25 MCG: 50 INJECTION, SOLUTION INTRAMUSCULAR; INTRAVENOUS at 08:48

## 2024-06-28 ASSESSMENT — PAIN - FUNCTIONAL ASSESSMENT
PAIN_FUNCTIONAL_ASSESSMENT: 0-10
PAIN_FUNCTIONAL_ASSESSMENT: 0-10

## 2024-06-28 ASSESSMENT — LIFESTYLE VARIABLES: SMOKING_STATUS: 0

## 2024-06-28 NOTE — ANESTHESIA POSTPROCEDURE EVALUATION
Department of Anesthesiology  Postprocedure Note    Patient: Ivory Song  MRN: [id number]  YOB: 1940  Date of evaluation: 6/28/2024    Procedure Summary       Date: 06/28/24 Room / Location: [address]    Anesthesia Start: 0826 Anesthesia Stop: 0925    Procedure: HAMMER TOE CORRECTION 2ND TOE LEFT FOOT AKIN BUNIONECTOMY LEFT (Left: Foot) Diagnosis:       Hammer toe of left foot      Acquired hallux valgus of left foot      (Hammer toe of left foot [M20.42])      (Acquired hallux valgus of left foot [M20.12])    Surgeons: Anselmo Rowley Jr., MARY Responsible Provider: Edward Desai MD    Anesthesia Type: MAC ASA Status: 3            Anesthesia Type: No value filed.    Alpa Phase I: Alpa Score: 10    Alpa Phase II: Alpa Score: 10    Anesthesia Post Evaluation    Patient location during evaluation: PACU  Patient participation: complete - patient participated  Level of consciousness: awake and alert  Airway patency: patent  Nausea & Vomiting: no nausea and no vomiting  Cardiovascular status: hemodynamically stable  Respiratory status: room air and spontaneous ventilation  Hydration status: stable  Pain management: satisfactory to patient    No notable events documented.

## 2024-06-28 NOTE — ADDENDUM NOTE
Addendum  created 06/28/24 1017 by Cinthia Batista APRN - CRNA    Intraprocedure Event edited, Intraprocedure Meds edited

## 2024-06-28 NOTE — H&P
Update History & Physical    The patient's History and Physical of 6 / 28 / 24 was reviewed with the patient and there were no significant changes.    I examined the patient and there were no significant changes from the previous History and Physical.    Blood pressure 118/65, pulse 81, temperature 98.2 °F (36.8 °C), temperature source Skin, resp. rate 16, height 1.651 m (5' 5\"), weight 78.9 kg (174 lb), SpO2 95 %, not currently breastfeeding.     Plan: The risk, benefits, expected outcome, and alternative to the recommended procedure have been discussed with the patient.  Patient understands and wants to proceed with the procedure.    Electronically signed by Anselmo Palmer Jr, DPM on 6/28/24 at 9:48 AM EDT

## 2024-06-28 NOTE — DISCHARGE INSTRUCTIONS
told you how to care for your incision, follow your doctor's instructions. If you did not get instructions, follow this general advice:  Wash around the incision with clean water 2 times a day. Don't use hydrogen peroxide or alcohol, which can slow healing.  When should you call for help?   Call your doctor now or seek immediate medical care if:    You have signs that your infection is getting worse, such as:  Increased pain, swelling, warmth, or redness in the area.  Red streaks leading from the area.  Pus draining from the wound.  A new or higher fever.   Watch closely for changes in your health, and be sure to contact your doctor if you have any problems.  Where can you learn more?  Go to https://www.[employer].net/patientEd and enter C340 to learn more about \"Infection After Surgery: Care Instructions.\"  Current as of: July 26, 2023  Content Version: 14.1  © 2006-2024 [employer].   Care instructions adapted under license by [employer]. If you have questions about a medical condition or this instruction, always ask your healthcare professional. [employer] disclaims any warranty or liability for your use of this information.           Nausea and Vomiting After Surgery: Care Instructions  Your Care Instructions     After you've had surgery, you may feel sick to your stomach (nauseated) or you may vomit. Sometimes anesthesia can make you feel sick. It's a common side effect and often doesn't last long. Pain also can make you feel sick or vomit. After the anesthesia wears off, you may feel pain from the incision (cut). That pain could then upset your stomach. Taking pain medicine can also make you feel sick to your stomach.  Whatever the cause, you may get medicine that can help. There are also some things you can do at home to prevent nausea and feel better.  The doctor has checked you carefully, but problems can develop later. If you notice any problems or new symptoms, get medical

## 2024-06-28 NOTE — OP NOTE
Operative Note      Patient: Ivory Song  YOB: 1940  MRN: [id number]    Date of Procedure: 6/28/2024    Pre-Op Diagnosis Codes:     * Hammer toe of left foot [M20.42]     * Acquired hallux valgus of left foot [M20.12]    Post-Op Diagnosis: Same       Procedure(s):  HAMMER TOE CORRECTION 2ND TOE LEFT FOOT AKIN BUNIONECTOMY LEFT,exostectomy dorsal distal aspect 1st Metatarsal     Surgeon(s):  Anselmo Rowley Jr., DPM    Assistant:   First Assistant: Shari Sterling RN    Anesthesia: Monitor Anesthesia Care    Estimated Blood Loss (mL): Minimal    Complications: None    Specimens:   * No specimens in log *    Implants:  Implant Name Type Inv. Item Serial No.  Lot No. LRB No. Used Action   STAPLE BNE [id number] NIT SHP MEM COMPRESSIVE FOR DISP - [id number]  STAPLE BNE [id number] NIT SHP MEM COMPRESSIVE FORC DISP  [facility] [id number] Left 1 Implanted   IMPLANT TOE M 0DEG GIOVANNI FOR HAMRTOE FIX PHALINX - [id number]  IMPLANT TOE M 0DEG GIOVANNI FOR HAMRTOE FIX PHALINX  [facility]  Left 1 Implanted   KWIRE FIX L102MM [id number] FOR HAMRTOE FIX PHALINX - [id number]  KWIRE FIX L102MM [id number] FOR HAMRTOE FIX PHALINX  [facility]  Left 1 Implanted         Drains: * No LDAs found *    Findings:  Infection Present At Time Of Surgery (PATOS) (choose all levels that have infection present):  No infection present  Other Findings: Consistent with diagnosis    Detailed Description of Procedure:   Patient presented into the operative room and placed in the operative table in the supine position.  Patient delivered IV sedation by department anesthesia.  Once sedated the patient's operative foot was localized by the surgeon comprised of an equal mixture of 0.5% Marcaine plain along with 2% lidocaine plain totaling 10 cc after which time the patient foot was prepared scrubbed and draped in a sterile fashion.  Tourniquet utilized at the level of the  capsulotomy were then made at the second metatarsal phalangeal joint.  This did allow the toe to plantarflexed in the appropriate fashion.  At this time final x-rays were taken.  All surgical site still open were flushed with saline.  Deep structures on the hammertoe were then reapproximated with 3-0 Monocryl and the skin was then reapproximated 4-0 nylon.  The exposed K wire was cut and a Jurgan ball applied    Tourniquet deflated good cap refill time returned foot was cleansed and a dry sterile dressing applied    It should be noted at this time the patient tolerated the surgery anesthesia well no complication patient prognosis good patient left the operative room went to recovery and be seen the office in 1 week for postoperative care.  Patient and patient's family was informed that the fixation devices were implanted and some may need to be removed in the future.    Electronically signed by Anselmo Palmer Jr, DPM on 6/28/2024 at 9:30 AM    Anselmo Palmer DPM   Board Certified Foot and Ankle Surgeon  Office: [phone]  Cell:  [phone]

## 2024-07-24 ENCOUNTER — TRANSCRIBE ORDERS (OUTPATIENT)
Dept: ADMINISTRATIVE | Age: 84
End: 2024-07-24

## 2024-07-24 DIAGNOSIS — R22.43 LOCALIZED SWELLING, MASS, OR LUMP OF BOTH LOWER EXTREMITIES: Primary | ICD-10-CM

## 2024-07-25 ENCOUNTER — HOSPITAL ENCOUNTER (OUTPATIENT)
Dept: ULTRASOUND IMAGING | Age: 84
Discharge: HOME | End: 2024-07-25
Attending: INTERNAL MEDICINE
Payer: MEDICARE

## 2024-07-25 DIAGNOSIS — R22.43 LOCALIZED SWELLING, MASS, OR LUMP OF BOTH LOWER EXTREMITIES: ICD-10-CM

## 2024-07-25 PROCEDURE — 93970 EXTREMITY STUDY: CPT

## 2024-08-13 ENCOUNTER — TRANSCRIBE ORDERS (OUTPATIENT)
Age: 84
End: 2024-08-13

## 2024-08-13 DIAGNOSIS — R60.0 LEG EDEMA: Primary | ICD-10-CM

## 2024-09-23 ENCOUNTER — TRANSCRIBE ORDERS (OUTPATIENT)
Dept: ADMINISTRATIVE | Age: 84
End: 2024-09-23

## 2024-09-23 DIAGNOSIS — R39.14 FEELING OF INCOMPLETE BLADDER EMPTYING: Primary | ICD-10-CM

## 2024-10-10 ENCOUNTER — HOSPITAL ENCOUNTER (OUTPATIENT)
Dept: ULTRASOUND IMAGING | Age: 84
Discharge: HOME | End: 2024-10-10
Payer: MEDICARE

## 2024-10-10 DIAGNOSIS — R39.14 FEELING OF INCOMPLETE BLADDER EMPTYING: ICD-10-CM

## 2024-10-10 PROCEDURE — 76775 US EXAM ABDO BACK WALL LIM: CPT

## 2024-11-13 ENCOUNTER — HOSPITAL ENCOUNTER (OUTPATIENT)
Age: 84
Discharge: HOME | End: 2024-11-15
Payer: MEDICARE

## 2024-11-13 VITALS
WEIGHT: 169 LBS | BODY MASS INDEX: 28.16 KG/M2 | HEIGHT: 65 IN | DIASTOLIC BLOOD PRESSURE: 62 MMHG | SYSTOLIC BLOOD PRESSURE: 114 MMHG

## 2024-11-13 DIAGNOSIS — R60.0 LEG EDEMA: ICD-10-CM

## 2024-11-13 LAB
ECHO AO ASC DIAM: 2.6 CM
ECHO AO ASCENDING AORTA INDEX: 1.41 CM/M2
ECHO AV AREA PEAK VELOCITY: 2.8 CM2
ECHO AV AREA VTI: 3.3 CM2
ECHO AV AREA/BSA PEAK VELOCITY: 1.5 CM2/M2
ECHO AV AREA/BSA VTI: 1.8 CM2/M2
ECHO AV CUSP MM: 2.3 CM
ECHO AV MEAN GRADIENT: 3 MMHG
ECHO AV MEAN VELOCITY: 0.8 M/S
ECHO AV PEAK GRADIENT: 5 MMHG
ECHO AV PEAK VELOCITY: 1.2 M/S
ECHO AV VTI: 27.1 CM
ECHO BSA: 1.87 M2
ECHO EST RA PRESSURE: 3 MMHG
ECHO LA DIAMETER INDEX: 2.17 CM/M2
ECHO LA DIAMETER: 4 CM
ECHO LA VOL A-L A2C: 66 ML (ref 22–52)
ECHO LA VOL A-L A4C: 45 ML (ref 22–52)
ECHO LA VOL BP: 50 ML (ref 22–52)
ECHO LA VOL MOD A2C: 63 ML (ref 22–52)
ECHO LA VOL MOD A4C: 39 ML (ref 22–52)
ECHO LA VOL/BSA BIPLANE: 27 ML/M2 (ref 16–34)
ECHO LA VOLUME AREA LENGTH: 55 ML
ECHO LA VOLUME INDEX A-L A2C: 36 ML/M2 (ref 16–34)
ECHO LA VOLUME INDEX A-L A4C: 24 ML/M2 (ref 16–34)
ECHO LA VOLUME INDEX AREA LENGTH: 30 ML/M2 (ref 16–34)
ECHO LA VOLUME INDEX MOD A2C: 34 ML/M2 (ref 16–34)
ECHO LA VOLUME INDEX MOD A4C: 21 ML/M2 (ref 16–34)
ECHO LV EF PHYSICIAN: 60 %
ECHO LV FRACTIONAL SHORTENING: 38 % (ref 28–44)
ECHO LV INTERNAL DIMENSION DIASTOLE INDEX: 2.88 CM/M2
ECHO LV INTERNAL DIMENSION DIASTOLIC: 5.3 CM (ref 3.9–5.3)
ECHO LV INTERNAL DIMENSION SYSTOLIC INDEX: 1.79 CM/M2
ECHO LV INTERNAL DIMENSION SYSTOLIC: 3.3 CM
ECHO LV IVSD: 1 CM (ref 0.6–0.9)
ECHO LV IVSS: 1.5 CM
ECHO LV MASS 2D: 213.9 G (ref 67–162)
ECHO LV MASS INDEX 2D: 116.2 G/M2 (ref 43–95)
ECHO LV POSTERIOR WALL DIASTOLIC: 1.1 CM (ref 0.6–0.9)
ECHO LV POSTERIOR WALL SYSTOLIC: 1.4 CM
ECHO LV RELATIVE WALL THICKNESS RATIO: 0.42
ECHO LVOT AREA: 4.2 CM2
ECHO LVOT AV VTI INDEX: 0.81
ECHO LVOT DIAM: 2.3 CM
ECHO LVOT MEAN GRADIENT: 2 MMHG
ECHO LVOT PEAK GRADIENT: 2 MMHG
ECHO LVOT PEAK GRADIENT: 3 MMHG
ECHO LVOT PEAK VELOCITY: 0.8 M/S
ECHO LVOT PEAK VELOCITY: 0.8 M/S
ECHO LVOT STROKE VOLUME INDEX: 49.4 ML/M2
ECHO LVOT SV: 90.9 ML
ECHO LVOT VTI: 21.9 CM
ECHO MV "A" WAVE DURATION: 159.9 MSEC
ECHO MV A VELOCITY: 0.82 M/S
ECHO MV AREA PHT: 4.5 CM2
ECHO MV AREA VTI: 3.4 CM2
ECHO MV E DECELERATION TIME (DT): 191.7 MS
ECHO MV E VELOCITY: 0.49 M/S
ECHO MV E/A RATIO: 0.6
ECHO MV LVOT VTI INDEX: 1.21
ECHO MV MAX VELOCITY: 1.1 M/S
ECHO MV MEAN GRADIENT: 2 MMHG
ECHO MV MEAN VELOCITY: 0.7 M/S
ECHO MV PEAK GRADIENT: 5 MMHG
ECHO MV PRESSURE HALF TIME (PHT): 49.2 MS
ECHO MV VTI: 26.6 CM
ECHO PV MAX VELOCITY: 0.8 M/S
ECHO PV MEAN GRADIENT: 1 MMHG
ECHO PV MEAN VELOCITY: 0.5 M/S
ECHO PV PEAK GRADIENT: 2 MMHG
ECHO PV VTI: 14.3 CM
ECHO PVEIN A DURATION: 171.3 MS
ECHO PVEIN A VELOCITY: 0.3 M/S
ECHO PVEIN PEAK D VELOCITY: 0.4 M/S
ECHO PVEIN PEAK S VELOCITY: 0.6 M/S
ECHO PVEIN S/D RATIO: 1.5
ECHO RIGHT VENTRICULAR SYSTOLIC PRESSURE (RVSP): 27 MMHG
ECHO RV INTERNAL DIMENSION: 3.3 CM
ECHO RV LONGITUDINAL DIMENSION: 6.2 CM
ECHO RV MID DIMENSION: 3.1 CM
ECHO RV TAPSE: 2.1 CM (ref 1.7–?)
ECHO RVOT MEAN GRADIENT: 1 MMHG
ECHO RVOT PEAK GRADIENT: 2 MMHG
ECHO RVOT PEAK VELOCITY: 0.6 M/S
ECHO RVOT VTI: 12.3 CM
ECHO TV REGURGITANT MAX VELOCITY: 2.43 M/S
ECHO TV REGURGITANT PEAK GRADIENT: 24 MMHG

## 2024-11-13 PROCEDURE — 93306 TTE W/DOPPLER COMPLETE: CPT

## (undated) DEVICE — GAUZE,SPONGE,4"X4",16PLY,XRAY,STRL,LF: Brand: MEDLINE

## (undated) DEVICE — GAUZE,SPONGE,4"X4",16PLY,STRL,LF,10/TRAY: Brand: MEDLINE

## (undated) DEVICE — PRECISION THIN (9.0 X 0.38 X 25.0MM)

## (undated) DEVICE — COVER,TABLE,60X90,STERILE: Brand: MEDLINE

## (undated) DEVICE — PLUMEPORT LAPAROSCOPIC SMOKE FILTRATION DEVICE: Brand: PLUMEPORT ACTIV

## (undated) DEVICE — Z DISCONTINUED NO SUB IDED GLOVE SURG BEAD CUF 8 STD PF WHT STRL TRIUMPH LT LTX

## (undated) DEVICE — MARKER,SKIN,WI/RULER AND LABELS: Brand: MEDLINE

## (undated) DEVICE — Device

## (undated) DEVICE — 3 BONE CEMENT MIXER: Brand: MIXEVAC

## (undated) DEVICE — NEEDLE HYPO 18GA L1.5IN PNK POLYPR HUB S STL THN WALL FILL

## (undated) DEVICE — MEDI-VAC YANKAUER SUCTION HANDLE: Brand: CARDINAL HEALTH

## (undated) DEVICE — KIT BEDSIDE REVITAL OX 500ML

## (undated) DEVICE — SUTURE ABSRB L6IN L37MM 0 GS-21 GRN 1/2 CIR TAPR PNT NDL VLOCL0306

## (undated) DEVICE — COVER,LIGHT HANDLE,FLX,1/PK: Brand: MEDLINE INDUSTRIES, INC.

## (undated) DEVICE — PAD,ABDOMINAL,8"X10",ST,LF: Brand: MEDLINE

## (undated) DEVICE — NDL CNTR 40CT FM MAG: Brand: MEDLINE INDUSTRIES, INC.

## (undated) DEVICE — PENCIL ES L3M BTTN SWCH HOLSTER W/ BLDE ELECTRD EDGE

## (undated) DEVICE — Z DISCONTINUED USE 2272124 DRAPE SURG XL N INVASIVE 2 LAYR DISP

## (undated) DEVICE — STRYKER PERFORMANCE SERIES SAGITTAL BLADE: Brand: STRYKER PERFORMANCE SERIES

## (undated) DEVICE — GOWN,SIRUS,NON REINFRCD,LARGE,SET IN SL: Brand: MEDLINE

## (undated) DEVICE — 12 ML SYRINGE,LUER-LOCK TIP: Brand: MONOJECT

## (undated) DEVICE — APPLIER CLP M/L SHFT DIA5MM 15 LIG LIGAMAX 5

## (undated) DEVICE — SPONGE,LAP,12"X12",XR,ST,5/PK,40PK/CS: Brand: MEDLINE

## (undated) DEVICE — CANNULA IV 18GA L15IN BLNT FILL LUERLOCK HUB MJCT

## (undated) DEVICE — MEDI-VAC NON-CONDUCTIVE SUCTION TUBING: Brand: CARDINAL HEALTH

## (undated) DEVICE — 3 ML SYRINGE LUER-LOCK TIP: Brand: MONOJECT

## (undated) DEVICE — GLOVE SURG SZ 75 L12IN FNGR THK94MIL STD WHT LTX FREE

## (undated) DEVICE — SHEET SUPPORT

## (undated) DEVICE — DOUBLE BASIN SET: Brand: MEDLINE INDUSTRIES, INC.

## (undated) DEVICE — Z DISCONTINUED PER MEDLINE USE 2741944 DRESSING AQUACEL 12 IN SURG W9XL30CM SIL CVR WTRPRF VIR BACT BARR ANTIMIC

## (undated) DEVICE — SYRINGE MED 10ML TRNSLUC BRL PLUNG BLK MRK POLYPR CTRL

## (undated) DEVICE — TUBING, SUCTION, 1/4" X 10', STRAIGHT: Brand: MEDLINE

## (undated) DEVICE — SYRINGE MED 30ML STD CLR PLAS LUERLOCK TIP N CTRL DISP

## (undated) DEVICE — NEEDLE FLTR 18GA L1.5IN MEM THK5UM BLNT DISP

## (undated) DEVICE — PMI PTFE COATED LAPAROSCOPIC WIRE L-HOOK 44 CM: Brand: PMI

## (undated) DEVICE — NEEDLE HYPO 18GA L1.5IN PNK POLYPR HUB S STL REG BVL STR

## (undated) DEVICE — HAMMERTOE DRILL: Brand: PHALINX

## (undated) DEVICE — INTENDED FOR TISSUE SEPARATION, AND OTHER PROCEDURES THAT REQUIRE A SHARP SURGICAL BLADE TO PUNCTURE OR CUT.: Brand: BARD-PARKER ® STAINLESS STEEL BLADES

## (undated) DEVICE — TOWEL OR BLUEE 16X26IN ST 8 PACK ORB08 16X26ORTWL

## (undated) DEVICE — PICO SINGLE USE NEGATIVE PRESSURE WOUND THERAPY SYSTEM 10CM X 30CM 4IN. X 12IN.: Brand: PICO

## (undated) DEVICE — ELECTRODE PT RET AD L9FT HI MOIST COND ADH HYDRGEL CORDED

## (undated) DEVICE — GAUZE,SPONGE,4"X4",12PLY,STERILE,LF,2'S: Brand: MEDLINE

## (undated) DEVICE — BANDAGE ADH W1XL3IN NAT FAB WVN FLX DURABLE N ADH PD SEAL

## (undated) DEVICE — BANDAGE,GAUZE,BULKEE II,4.5"X4.1YD,STRL: Brand: MEDLINE

## (undated) DEVICE — PRECISION THIN (5.5 X 0.38 X 18.0MM)

## (undated) DEVICE — [HIGH FLOW INSUFFLATOR,  DO NOT USE IF PACKAGE IS DAMAGED,  KEEP DRY,  KEEP AWAY FROM SUNLIGHT,  PROTECT FROM HEAT AND RADIOACTIVE SOURCES.]: Brand: PNEUMOSURE

## (undated) DEVICE — PADDING,UNDERCAST,COTTON, 4"X4YD STERILE: Brand: MEDLINE

## (undated) DEVICE — APPLICATOR MEDICATED 26 CC SOLUTION HI LT ORNG CHLORAPREP

## (undated) DEVICE — GOWN,SIRUS,FABRNF,XL,20/CS: Brand: MEDLINE

## (undated) DEVICE — GLOVE ORANGE PI 7   MSG9070

## (undated) DEVICE — NEEDLE HYPO 25GA L1.5IN BLU POLYPR HUB S STL REG BVL STR

## (undated) DEVICE — TIBURON EXTREMITY SHEET: Brand: CONVERTORS

## (undated) DEVICE — 3M™ RED DOT™ MONITORING ELECTRODE WITH FOAM TAPE AND STICKY GEL 2560, 50/BAG, 20/CASE, 72/PLT: Brand: RED DOT™

## (undated) DEVICE — SYRINGE IRRIG 60ML SFT PLIABLE BLB EZ TO GRP 1 HND USE W/

## (undated) DEVICE — PACK SURG LAP CHOLE CUSTOM

## (undated) DEVICE — PITCHER PT 1200ML W HNDL CSR WRP

## (undated) DEVICE — BASIC PACK: Brand: CONVERTORS

## (undated) DEVICE — 6 ML SYRINGE LUER-LOCK TIP: Brand: MONOJECT

## (undated) DEVICE — SHEET DRAPE FULL 70X100

## (undated) DEVICE — SYRINGE MED 50ML LUERLOCK TIP

## (undated) DEVICE — MASTISOL ADHESIVE LIQ 2/3ML

## (undated) DEVICE — GOWN SURG XL SMS FAB NONREINFORCED RAGLAN SLV HK LOOP CLSR

## (undated) DEVICE — NEEDLE, QUINCKE, 22GX5": Brand: MEDLINE

## (undated) DEVICE — NON-DEHP CATHETER EXTENSION SET, MALE LUER LOCK ADAPTER

## (undated) DEVICE — LAPAROSCOPIC SCISSORS: Brand: EPIX LAPAROSCOPIC SCISSORS

## (undated) DEVICE — GUN GLUE STRYKER

## (undated) DEVICE — SOLUTION IRRIG 1000ML 09% SOD CHL USP PIC PLAS CONTAINER

## (undated) DEVICE — GLOVE ORANGE PI 8   MSG9080

## (undated) DEVICE — STANDARD HYPODERMIC NEEDLE,POLYPROPYLENE HUB: Brand: MONOJECT

## (undated) DEVICE — 3M™ STERI-DRAPE™ U-DRAPE 1015: Brand: STERI-DRAPE™

## (undated) DEVICE — BANDAGE COMPR L W4INXL11YD 100% COT WVN E DBL LEN CLP CLSR

## (undated) DEVICE — GOWN,SIRUS,NONRNF,SETINSLV,XL,20/CS: Brand: MEDLINE

## (undated) DEVICE — 3M™ IOBAN™ 2 ANTIMICROBIAL INCISE DRAPE 6651EZ: Brand: IOBAN™ 2

## (undated) DEVICE — READY WET SKIN SCRUB TRAY-LF: Brand: MEDLINE INDUSTRIES, INC.

## (undated) DEVICE — GLOVE ORANGE PI 7 1/2   MSG9075

## (undated) DEVICE — TOWEL,OR,DSP,ST,BLUE,STD,6/PK,12PK/CS: Brand: MEDLINE

## (undated) DEVICE — GOWN,SIRUS,POLYRNF,BRTHSLV,XLN/XL,20/CS: Brand: MEDLINE

## (undated) DEVICE — SUTURE DEV SZ 0 L6IN N ABSORBABLE

## (undated) DEVICE — HOOK LOCK LATEX FREE ELASTIC BANDAGE 3INX5YD

## (undated) DEVICE — GLOVE,SURG,SENSICARE,ALOE,LF,PF,7: Brand: MEDLINE

## (undated) DEVICE — Z DISCONTINUED APPLICATOR SURG PREP 0.35OZ 2% CHG 70% ISO ALC W/ HI LT

## (undated) DEVICE — SOLUTION IV 1000ML 0.9% SOD CHL PH 5 INJ USP VIAFLX PLAS

## (undated) DEVICE — SUTURE STRATAFIX SYMMETRIC SZ 1 L18IN ABSRB VLT CT1 L36CM SXPP1A404

## (undated) DEVICE — TROCAR: Brand: KII SLEEVE

## (undated) DEVICE — Z DISCONTINUED USE 2275686 GLOVE SURG SZ 8 L12IN FNGR THK13MIL WHT ISOLEX POLYISOPRENE

## (undated) DEVICE — TROCAR: Brand: KII FIOS FIRST ENTRY

## (undated) DEVICE — PACK,EXTREMITY,ORTHOMAX,5/CS: Brand: MEDLINE

## (undated) DEVICE — SYRINGE BLB 50CC IRRIG PLIABLE FNGR FLNG GRAD FLSK DISP

## (undated) DEVICE — INSUFFLATION NEEDLE TO ESTABLISH PNEUMOPERITONEUM.: Brand: INSUFFLATION NEEDLE

## (undated) DEVICE — BANDAGE COMPR W6INXL12FT SMOOTH FOR LIMB EXSANG ESMARCH

## (undated) DEVICE — PEN: MARKING STD 100/CS: Brand: MEDICAL ACTION INDUSTRIES

## (undated) DEVICE — TUBING SUCT 12FR MAL ALUM SHFT FN CAP VENT UNIV CONN W/ OBT

## (undated) DEVICE — SOLUTION IV IRRIG POUR BRL 0.9% SODIUM CHL 2F7124

## (undated) DEVICE — STRIP,CLOSURE,WOUND,MEDI-STRIP,1/2X4: Brand: MEDLINE

## (undated) DEVICE — SET MAJOR INSTR ORTHO

## (undated) DEVICE — HANDLE CVR PATENTED RETENTION DISC STRL LIGHT SHLD

## (undated) DEVICE — SURGICAL PROCEDURE PACK ORTH IV ECLIPSE STRL

## (undated) DEVICE — GARMENT,MEDLINE,DVT,INT,CALF,MED, GEN2: Brand: MEDLINE

## (undated) DEVICE — BANDAGE COMPR W6INXL5YD SELF ADH COHESIVE CO FLX

## (undated) DEVICE — 1810 FOAM BLOCK NEEDLE COUNTER: Brand: DEVON

## (undated) DEVICE — HAMMERTOE K-WIRE
Type: IMPLANTABLE DEVICE | Site: FOOT | Status: NON-FUNCTIONAL
Brand: PHALINX
Removed: 2024-06-28

## (undated) DEVICE — COUNTER NDL 10 COUNT HLD 20 FOAM BLK SGL MAG

## (undated) DEVICE — BLADE SAW SAG NAR THCK LNG 12.5MM

## (undated) DEVICE — CLOTH PREP W7.5XL7.5IN 2% CHG SKIN ALC AND RNS FREE

## (undated) DEVICE — GLOVE ORTHO 8   MSG9480

## (undated) DEVICE — SOLUTION IRRIG 1000ML 0.9% SOD CHL USP POUR PLAS BTL

## (undated) DEVICE — DRESSING ALG W2XL5IN ANTIMIC WND JUMPSTART

## (undated) DEVICE — SUTURE MONOCRYL SZ 3-0 L27IN ABSRB UD L26MM SH 1/2 CIR Y416H

## (undated) DEVICE — CHLORAPREP 26ML ORANGE

## (undated) DEVICE — DRAPE 84X54IN RADIOLOGY C ARM KEYBOARD

## (undated) DEVICE — APPLICATOR MEDICATED 10.5 CC SOLUTION HI LT ORNG CHLORAPREP

## (undated) DEVICE — BLOCK BITE 60FR CAREGUARD

## (undated) DEVICE — SOLUTION IV IRRIG WATER 1000ML POUR BRL 2F7114

## (undated) DEVICE — BLADE ES L6IN ELASTOMERIC COAT EXT DURABLE BEND UPTO 90DEG